# Patient Record
Sex: FEMALE | Race: OTHER | Employment: UNEMPLOYED | URBAN - METROPOLITAN AREA
[De-identification: names, ages, dates, MRNs, and addresses within clinical notes are randomized per-mention and may not be internally consistent; named-entity substitution may affect disease eponyms.]

---

## 2019-06-05 ENCOUNTER — HOSPITAL ENCOUNTER (OUTPATIENT)
Age: 39
Discharge: HOME | End: 2019-06-05
Payer: COMMERCIAL

## 2019-06-05 LAB
ABSOLUTE EOS #: 0.09 K/UL (ref 0–0.44)
ABSOLUTE IMMATURE GRANULOCYTE: 0.03 K/UL (ref 0–0.3)
ABSOLUTE LYMPH #: 1.83 K/UL (ref 1.1–3.7)
ABSOLUTE MONO #: 0.64 K/UL (ref 0.1–1.2)
ALBUMIN SERPL-MCNC: 4.1 G/DL (ref 3.5–5.2)
ALBUMIN/GLOBULIN RATIO: 1.3 (ref 1–2.5)
ALP BLD-CCNC: 50 U/L (ref 35–104)
ALT SERPL-CCNC: 6 U/L (ref 5–33)
ANION GAP SERPL CALCULATED.3IONS-SCNC: 14 MMOL/L (ref 9–17)
AST SERPL-CCNC: 15 U/L
BASOPHILS # BLD: 1 % (ref 0–2)
BASOPHILS ABSOLUTE: 0.04 K/UL (ref 0–0.2)
BILIRUB SERPL-MCNC: 0.29 MG/DL (ref 0.3–1.2)
BILIRUBIN DIRECT: <0.08 MG/DL
BILIRUBIN, INDIRECT: ABNORMAL MG/DL (ref 0–1)
BUN BLDV-MCNC: 5 MG/DL (ref 6–20)
BUN/CREAT BLD: ABNORMAL (ref 9–20)
CALCIUM SERPL-MCNC: 9.2 MG/DL (ref 8.6–10.4)
CHLORIDE BLD-SCNC: 105 MMOL/L (ref 98–107)
CO2: 21 MMOL/L (ref 20–31)
CREAT SERPL-MCNC: 0.54 MG/DL (ref 0.5–0.9)
DIFFERENTIAL TYPE: ABNORMAL
EOSINOPHILS RELATIVE PERCENT: 1 % (ref 1–4)
GFR AFRICAN AMERICAN: >60 ML/MIN
GFR NON-AFRICAN AMERICAN: >60 ML/MIN
GFR SERPL CREATININE-BSD FRML MDRD: ABNORMAL ML/MIN/{1.73_M2}
GFR SERPL CREATININE-BSD FRML MDRD: ABNORMAL ML/MIN/{1.73_M2}
GLOBULIN: ABNORMAL G/DL (ref 1.5–3.8)
GLUCOSE BLD-MCNC: 98 MG/DL (ref 70–99)
HAV IGM SER IA-ACNC: NONREACTIVE
HCG QUANTITATIVE: <1 IU/L
HCT VFR BLD CALC: 39 % (ref 36.3–47.1)
HEMOGLOBIN: 12.1 G/DL (ref 11.9–15.1)
HEPATITIS B CORE IGM ANTIBODY: NONREACTIVE
HEPATITIS B SURFACE ANTIGEN: NONREACTIVE
HEPATITIS C ANTIBODY: NONREACTIVE
HIV AG/AB: NONREACTIVE
IMMATURE GRANULOCYTES: 0 %
LYMPHOCYTES # BLD: 24 % (ref 24–43)
MCH RBC QN AUTO: 31 PG (ref 25.2–33.5)
MCHC RBC AUTO-ENTMCNC: 31 G/DL (ref 28.4–34.8)
MCV RBC AUTO: 100 FL (ref 82.6–102.9)
MONOCYTES # BLD: 8 % (ref 3–12)
NRBC AUTOMATED: 0 PER 100 WBC
PDW BLD-RTO: 12.8 % (ref 11.8–14.4)
PLATELET # BLD: 268 K/UL (ref 138–453)
PLATELET ESTIMATE: ABNORMAL
PMV BLD AUTO: 10.9 FL (ref 8.1–13.5)
POTASSIUM SERPL-SCNC: 4.1 MMOL/L (ref 3.7–5.3)
RBC # BLD: 3.9 M/UL (ref 3.95–5.11)
RBC # BLD: ABNORMAL 10*6/UL
SEG NEUTROPHILS: 66 % (ref 36–65)
SEGMENTED NEUTROPHILS ABSOLUTE COUNT: 4.96 K/UL (ref 1.5–8.1)
SODIUM BLD-SCNC: 140 MMOL/L (ref 135–144)
TOTAL PROTEIN: 7.2 G/DL (ref 6.4–8.3)
WBC # BLD: 7.6 K/UL (ref 3.5–11.3)
WBC # BLD: ABNORMAL 10*3/UL

## 2019-06-05 PROCEDURE — 84702 CHORIONIC GONADOTROPIN TEST: CPT

## 2019-06-05 PROCEDURE — 80076 HEPATIC FUNCTION PANEL: CPT

## 2019-06-05 PROCEDURE — 36415 COLL VENOUS BLD VENIPUNCTURE: CPT

## 2019-06-05 PROCEDURE — 85025 COMPLETE CBC W/AUTO DIFF WBC: CPT

## 2019-06-05 PROCEDURE — 80048 BASIC METABOLIC PNL TOTAL CA: CPT

## 2019-06-05 PROCEDURE — 80074 ACUTE HEPATITIS PANEL: CPT

## 2019-06-05 PROCEDURE — 86480 TB TEST CELL IMMUN MEASURE: CPT

## 2019-06-05 PROCEDURE — 87389 HIV-1 AG W/HIV-1&-2 AB AG IA: CPT

## 2019-06-07 LAB
QUANTI TB GOLD PLUS: NEGATIVE
QUANTI TB1 MINUS NIL: 0 IU/ML (ref 0–0.34)
QUANTI TB2 MINUS NIL: 0 IU/ML (ref 0–0.34)
QUANTIFERON MITOGEN: 9.58 IU/ML
QUANTIFERON NIL: 0.02 IU/ML

## 2020-12-20 RX ORDER — NIFEDIPINE 30 MG/1
1 TABLET, EXTENDED RELEASE ORAL DAILY
COMMUNITY
Start: 2019-03-31 | End: 2020-12-21

## 2020-12-21 ENCOUNTER — OFFICE VISIT (OUTPATIENT)
Dept: FAMILY MEDICINE CLINIC | Age: 40
End: 2020-12-21
Payer: COMMERCIAL

## 2020-12-21 VITALS
TEMPERATURE: 97.7 F | DIASTOLIC BLOOD PRESSURE: 88 MMHG | WEIGHT: 160.8 LBS | BODY MASS INDEX: 25.24 KG/M2 | OXYGEN SATURATION: 96 % | HEIGHT: 67 IN | HEART RATE: 88 BPM | SYSTOLIC BLOOD PRESSURE: 138 MMHG

## 2020-12-21 PROBLEM — Z82.49 FAMILY HISTORY OF EARLY CAD: Status: ACTIVE | Noted: 2020-12-21

## 2020-12-21 PROBLEM — F41.1 GENERALIZED ANXIETY DISORDER: Status: ACTIVE | Noted: 2020-12-21

## 2020-12-21 PROBLEM — Z80.3 FAMILY HISTORY OF BREAST CANCER IN MOTHER: Status: ACTIVE | Noted: 2020-12-21

## 2020-12-21 PROBLEM — M50.30 DEGENERATION OF CERVICAL INTERVERTEBRAL DISC: Status: ACTIVE | Noted: 2020-12-21

## 2020-12-21 PROBLEM — K21.9 GASTRO-ESOPHAGEAL REFLUX DISEASE WITHOUT ESOPHAGITIS: Status: ACTIVE | Noted: 2020-12-21

## 2020-12-21 PROBLEM — Z92.29 HISTORY OF OPIATE THERAPY: Status: ACTIVE | Noted: 2020-12-21

## 2020-12-21 PROBLEM — Z80.0 FAMILY HISTORY OF COLON CANCER: Status: ACTIVE | Noted: 2020-12-21

## 2020-12-21 PROBLEM — I10 ESSENTIAL HYPERTENSION: Status: ACTIVE | Noted: 2020-12-21

## 2020-12-21 PROBLEM — F17.200 SMOKER: Status: ACTIVE | Noted: 2020-12-21

## 2020-12-21 PROBLEM — F11.988 OPIOID USE, UNSPECIFIED WITH OTHER OPIOID-INDUCED DISORDER (HCC): Status: ACTIVE | Noted: 2020-12-21

## 2020-12-21 PROCEDURE — G8482 FLU IMMUNIZE ORDER/ADMIN: HCPCS | Performed by: FAMILY MEDICINE

## 2020-12-21 PROCEDURE — 99204 OFFICE O/P NEW MOD 45 MIN: CPT | Performed by: FAMILY MEDICINE

## 2020-12-21 PROCEDURE — G8419 CALC BMI OUT NRM PARAM NOF/U: HCPCS | Performed by: FAMILY MEDICINE

## 2020-12-21 PROCEDURE — G8427 DOCREV CUR MEDS BY ELIG CLIN: HCPCS | Performed by: FAMILY MEDICINE

## 2020-12-21 PROCEDURE — 1036F TOBACCO NON-USER: CPT | Performed by: FAMILY MEDICINE

## 2020-12-21 RX ORDER — BUPRENORPHINE AND NALOXONE 8; 2 MG/1; MG/1
FILM, SOLUBLE BUCCAL; SUBLINGUAL
COMMUNITY
Start: 2020-12-12

## 2020-12-21 RX ORDER — LISINOPRIL 5 MG/1
5 TABLET ORAL DAILY
Qty: 90 TABLET | Refills: 1 | Status: SHIPPED | OUTPATIENT
Start: 2020-12-21 | End: 2021-01-18 | Stop reason: SDUPTHER

## 2020-12-21 RX ORDER — ESCITALOPRAM OXALATE 10 MG/1
10 TABLET ORAL DAILY
Qty: 30 TABLET | Refills: 3 | Status: SHIPPED | OUTPATIENT
Start: 2020-12-21 | End: 2022-03-24

## 2020-12-21 ASSESSMENT — ANXIETY QUESTIONNAIRES
2. NOT BEING ABLE TO STOP OR CONTROL WORRYING: 2-OVER HALF THE DAYS
6. BECOMING EASILY ANNOYED OR IRRITABLE: 2-OVER HALF THE DAYS
4. TROUBLE RELAXING: 0-NOT AT ALL
GAD7 TOTAL SCORE: 10
1. FEELING NERVOUS, ANXIOUS, OR ON EDGE: 2-OVER HALF THE DAYS
7. FEELING AFRAID AS IF SOMETHING AWFUL MIGHT HAPPEN: 0-NOT AT ALL
5. BEING SO RESTLESS THAT IT IS HARD TO SIT STILL: 2-OVER HALF THE DAYS
3. WORRYING TOO MUCH ABOUT DIFFERENT THINGS: 2-OVER HALF THE DAYS

## 2020-12-21 ASSESSMENT — PATIENT HEALTH QUESTIONNAIRE - PHQ9
SUM OF ALL RESPONSES TO PHQ QUESTIONS 1-9: 0
SUM OF ALL RESPONSES TO PHQ9 QUESTIONS 1 & 2: 0
SUM OF ALL RESPONSES TO PHQ QUESTIONS 1-9: 0
SUM OF ALL RESPONSES TO PHQ QUESTIONS 1-9: 0
1. LITTLE INTEREST OR PLEASURE IN DOING THINGS: 0
2. FEELING DOWN, DEPRESSED OR HOPELESS: 0

## 2020-12-21 NOTE — PROGRESS NOTES
Visit Information    Have you changed or started any medications since your last visit including any over-the-counter medicines, vitamins, or herbal medicines? no   Have you stopped taking any of your medications? Is so, why? -  no  Are you having any side effects from any of your medications? - no    Have you seen any other physician or provider since your last visit?  no   Have you had any other diagnostic tests since your last visit?  no   Have you been seen in the emergency room and/or had an admission in a hospital since we last saw you?  no   Have you had your routine dental cleaning in the past 6 months?  yes -      Do you have an active MyChart account? If no, what is the barrier?   Yes    Patient Care Team:  Hay Edmonds DO as PCP - General (Family Medicine)    Medical History Review  Past Medical, Family, and Social History reviewed and does contribute to the patient presenting condition    Health Maintenance   Topic Date Due    Varicella vaccine (1 of 2 - 2-dose childhood series) 11/06/1981    DTaP/Tdap/Td vaccine (1 - Tdap) 11/06/1999    Cervical cancer screen  09/15/2017    Lipid screen  11/06/2020    Flu vaccine  Completed    HIV screen  Completed    Hepatitis A vaccine  Aged Out    Hepatitis B vaccine  Aged Out    Hib vaccine  Aged Out    Meningococcal (ACWY) vaccine  Aged Out    Pneumococcal 0-64 years Vaccine  Aged Out

## 2020-12-21 NOTE — PROGRESS NOTES
program for 2 years now. Court mandated. Patient states that her dose is currently being titrated. She denies any other drug use    ANXIETY  Alejandra has an ongoing history of Anxiety. Symptoms includes  below. Symptoms have been going on a long time. Clinical course gradually worsening. Previous treatment includes wellbutrin and xanax , which provided no relief. Patient denies current suicidal and homicidal ideation/intent. Family history significant for anxiety and substance abuse. Possible organic causes contributing are: none. CHP TULIO-7 12/21/2020   Feeling nervous, anxious, or on edge 2-Over half the days   Not able to stop or control worrying 2-Over half the days   Worrying too much about different things 2-Over half the days   Trouble relaxing 0-Not at all   Being so restless that it's hard to sit still 2-Over half the days   Becoming easily annoyed or irritable 2-Over half the days   Feeling afraid as if something awful might happen 0-Not at all   TULIO-7 Total Score 10     PHQ-2 Over the past 2 weeks, how often have you been bothered by any of the following problems? Little interest or pleasure in doing things: Not at all  Feeling down, depressed, or hopeless: Not at all  PHQ-2 Score: 0  PHQ-9 Over the past 2 weeks, how often have you been bothered by any of the following problems?   PHQ-9 Total Score: 0  PHQ-9 Total Score: 0 (12/21/2020  9:12 AM)     Counseling given: Not Answered  Comment: 1 pack every two days    Patient Active Problem List   Diagnosis    Fatigue    Depression    Lumbar radiculopathy, chronic    Degeneration of cervical intervertebral disc    Gastro-esophageal reflux disease without esophagitis    Generalized anxiety disorder    Essential hypertension    Opioid use, unspecified with other opioid-induced disorder (HCC)(Suboxone)    History of opiate therapy    Smoker    Family history of colon cancer    Family history of breast cancer in mother    Family history of early CAD Past Medical History:   Diagnosis Date    Anxiety 2/13/2015    Degeneration of cervical intervertebral disc 12/21/2020    Depression 12/5/2012    Essential hypertension 12/21/2020    Gastro-esophageal reflux disease without esophagitis 12/21/2020    Lumbar radiculopathy, acute 2/27/2013    Opioid use, unspecified with other opioid-induced disorder (HCC)(Suboxone) 12/21/2020    History reviewed. No pertinent surgical history. Family History   Problem Relation Age of Onset    Heart Disease Mother     Diabetes Father      Current Outpatient Medications   Medication Sig Dispense Refill    lisinopril (PRINIVIL;ZESTRIL) 5 MG tablet Take 1 tablet by mouth daily 90 tablet 1    escitalopram (LEXAPRO) 10 MG tablet Take 1 tablet by mouth daily 30 tablet 3    buprenorphine-naloxone (SUBOXONE) 8-2 MG FILM SL film        No current facility-administered medications for this visit. Review of Systems   Prior to Visit Medications    Medication Sig Taking? Authorizing Provider   lisinopril (PRINIVIL;ZESTRIL) 5 MG tablet Take 1 tablet by mouth daily Yes TABITHA Hopson CNP   escitalopram (LEXAPRO) 10 MG tablet Take 1 tablet by mouth daily Yes TABITHA Hopson CNP   buprenorphine-naloxone (SUBOXONE) 8-2 MG FILM SL film   Historical Provider, MD      Social History     Tobacco Use    Smoking status: Former Smoker     Years: 10.00     Types: Cigarettes    Smokeless tobacco: Never Used    Tobacco comment: 1 pack every two days   Substance Use Topics    Alcohol use: Yes     Comment: social      Vitals:    12/21/20 0907   BP: 138/88   Pulse: 88   Temp: 97.7 °F (36.5 °C)   SpO2: 96%   Weight: 160 lb 12.8 oz (72.9 kg)   Height: 5' 6.75\" (1.695 m)     Estimated body mass index is 25.37 kg/m² as calculated from the following:    Height as of this encounter: 5' 6.75\" (1.695 m). Weight as of this encounter: 160 lb 12.8 oz (72.9 kg).   Physical Exam  Most recent labs reviewed with patient, and all questions answered. Lab Results   Component Value Date    WBC 7.6 06/05/2019    HGB 12.1 06/05/2019    HCT 39.0 06/05/2019    .0 06/05/2019     06/05/2019     Lab Results   Component Value Date     06/05/2019    K 4.1 06/05/2019     06/05/2019    CO2 21 06/05/2019    BUN 5 06/05/2019    CREATININE 0.54 06/05/2019    GLUCOSE 98 06/05/2019    GLUCOSE 91 05/01/2012    CALCIUM 9.2 06/05/2019      Lab Results   Component Value Date    ALT 6 06/05/2019    AST 15 06/05/2019    ALKPHOS 50 06/05/2019    BILITOT 0.29 (L) 06/05/2019     Lab Results   Component Value Date    TSH 0.76 12/21/2012     ASSESSMENT/PLAN:  1. Essential hypertension  Worsening  Continue current therapy. DISCUSSED AND ADVISED TO:  Cut down on your salt intake. Cut down on caffeinated drinks, sports drinks. Instructed to check BP at home regularly. Report for any chest pains, shortness of breath, headaches, and lightheadedness. Call the office if your blood pressure continue to be higher than 140/90 or 90/50.      - lisinopril (PRINIVIL;ZESTRIL) 5 MG tablet; Take 1 tablet by mouth daily  Dispense: 90 tablet; Refill: 1    2. Lumbar radiculopathy, chronic  Stable  Continue current therapy. DISCUSSED AND ADVISED TO:  Use heat packs 15 to 20 mins every 2-3 hours. Do some back stretches as tolerated. Refer to hand out for instructions. Call for worsening, numbness, weakness. 3. Degeneration of cervical intervertebral disc  Stable  Continue current therapy. DISCUSSED and ADVISED TO:  Stay at a healthy weight. Continue exercises/PT  Stretch to help prevent stiffness and to prevent injury before exercise. Gentle forms of yoga help keep joints and muscles flexible. Walk instead of jog, ride a bike, swim, and water exercise. Lift weights as tolerated. strong muscles help reduce stress on joints. Take pain medicines exactly as directed and only as needed.       4. Generalized anxiety disorder  Worsening  Continue current therapy. Discussed how to recognize anxiety. Advised to relieve tension with exercise or a massage. Advised to get enough rest.  Advised to avoid alcohol, caffeine, nicotine, and illegal drugs. Which can increase anxiety level and cause sleep problems.    - CBC Auto Differential; Future  - Comprehensive Metabolic Panel, Fasting; Future  - Lipid, Fasting; Future  - Urine Drug Screen; Future  - escitalopram (LEXAPRO) 10 MG tablet; Take 1 tablet by mouth daily  Dispense: 30 tablet; Refill: 3    5. Opioid use, unspecified with other opioid-induced disorder (HCC)(Suboxone)  Stable  Continue  monitor  - buprenorphine-naloxone (SUBOXONE) 8-2 MG FILM SL film  - Urine Drug Screen; Future    6. Smoker  Ongoing  Counseling given to quit smoking. Support offered. Hand out given. Educational material given  Patient declined despite discussion. 7. History of opiate therapy  Ongoing  Monitor  - buprenorphine-naloxone (SUBOXONE) 8-2 MG FILM SL film  - Urine Drug Screen; Future    8. Fatigue, unspecified type  Evaluate  - CBC Auto Differential; Future  - Comprehensive Metabolic Panel, Fasting; Future  - TSH without Reflex; Future  - Vitamin D 25 Hydroxy; Future    9. Elevated fasting blood sugar  Ongoing   Evaluate for metabolic disorders   and or vitamin deficiencies. - Comprehensive Metabolic Panel, Fasting; Future  - Hemoglobin A1C; Future  - Urinalysis Reflex to Culture; Future    10. Family history of colon cancer  historical    11. Family history of breast cancer in mother  historical    15. Family history of early CAD  histporical    15. Screening for lipid disorders    - Lipid, Fasting; Future    14. Need for varicella vaccine    - Varicella Zoster Antibody, IgG; Future     Controlled Substance Monitoring:  Acute and Chronic Pain Monitoring:   RX Monitoring 12/21/2020   Periodic Controlled Substance Monitoring Potential drug abuse or diversion identified, see note documentation.      Orders Placed This Encounter   Procedures    CBC Auto Differential     Standing Status:   Future     Standing Expiration Date:   6/20/2022    Comprehensive Metabolic Panel, Fasting     Standing Status:   Future     Standing Expiration Date:   6/20/2022    Hemoglobin A1C     Standing Status:   Future     Standing Expiration Date:   6/20/2022    Lipid, Fasting     Standing Status:   Future     Standing Expiration Date:   6/20/2022    TSH without Reflex     Standing Status:   Future     Standing Expiration Date:   6/20/2022    Urinalysis Reflex to Culture     Standing Status:   Future     Standing Expiration Date:   6/20/2022     Order Specific Question:   SPECIFY(EX-CATH,MIDSTREAM,CYSTO,ETC)?      Answer:   midstream    Vitamin D 25 Hydroxy     Standing Status:   Future     Standing Expiration Date:   6/20/2022    Varicella Zoster Antibody, IgG     Standing Status:   Future     Standing Expiration Date:   6/20/2022    Urine Drug Screen     Standing Status:   Future     Standing Expiration Date:   12/21/2021     Orders Placed This Encounter   Medications    lisinopril (PRINIVIL;ZESTRIL) 5 MG tablet     Sig: Take 1 tablet by mouth daily     Dispense:  90 tablet     Refill:  1    escitalopram (LEXAPRO) 10 MG tablet     Sig: Take 1 tablet by mouth daily     Dispense:  30 tablet     Refill:  3      Medications Discontinued During This Encounter   Medication Reason    acetaminophen-codeine (TYLENOL #3) 300-30 MG per tablet LIST CLEANUP    albuterol (PROVENTIL HFA) 108 (90 BASE) MCG/ACT inhaler LIST CLEANUP    ALPRAZolam (XANAX) 1 MG tablet LIST CLEANUP    buPROPion (WELLBUTRIN SR) 200 MG SR tablet LIST CLEANUP    cyclobenzaprine (FLEXERIL) 10 MG tablet LIST CLEANUP    busPIRone (BUSPAR) 15 MG tablet LIST CLEANUP    ibuprofen (IBU) 800 MG tablet LIST CLEANUP    naproxen (NAPROSYN) 500 MG tablet LIST CLEANUP    NIFEdipine (PROCARDIA XL) 30 MG extended release tablet LIST CLEANUP      Health Maintenance Due   Topic Date Due    Varicella vaccine (1 of 2 - 2-dose childhood series) 11/06/1981    DTaP/Tdap/Td vaccine (1 - Tdap) 11/06/1999    Cervical cancer screen  09/15/2017    Potassium monitoring  06/05/2020    Creatinine monitoring  06/05/2020    Lipid screen  11/06/2020      Return in about 4 weeks (around 1/18/2021) for Rev. results,neuro consult, pain mgmt,  30mins. Alejandra received counseling on the following healthy behaviors: nutrition, exercise, medication adherence and tobacco cessation  Reviewed prior labs and health maintenance  Continue current medications, diet and exercise. Discussed use, benefit, and side effects of prescribed medications. Barriers to medication compliance addressed. Patient given educational materials - see patient instructions  Was a self-tracking handout given in paper form or via [employer]? Yes    Requested Prescriptions     Signed Prescriptions Disp Refills    lisinopril (PRINIVIL;ZESTRIL) 5 MG tablet 90 tablet 1     Sig: Take 1 tablet by mouth daily    escitalopram (LEXAPRO) 10 MG tablet 30 tablet 3     Sig: Take 1 tablet by mouth daily       All patient questions answered. Patient voiced understanding. Quality Measures    Body mass index is 25.37 kg/m². Elevated. Weight control planned discussed Healthy diet and regular exercise. BP: 138/88 Blood pressure is high. Treatment plan consists of Antihypertensive Medication Started. No results found for: LDLCALC, LDLCHOLESTEROL, LDLDIRECT (goal LDL reduction with dx if diabetes is 50% LDL reduction)      PHQ Scores 12/21/2020   PHQ2 Score 0   PHQ9 Score 0     Interpretation of Total Score Depression Severity: 1-4 = Minimal depression, 5-9 = Mild depression, 10-14 = Moderate depression, 15-19 = Moderately severe depression, 20-27 = Severe depression   This note was completed by using the assistance of a speech-recognition program. However, inadvertent computerized transcription errors may be present.  Although every effort was made to ensure accuracy, no guarantees can be provided that every mistake has been identified and corrected by editing   An electronic signature was used to authenticate this note.   --TABITHA Aleman - CNP on 12/21/2020 at 10:09 AM

## 2020-12-21 NOTE — PATIENT INSTRUCTIONS
Patient Education        escitalopram  Pronunciation:  ELLIOTT ALVARADO o latrice  Brand:  Lexapro  What is the most important information I should know about escitalopram?  You should not use this medicine you also take pimozide (Orap) or citalopram (Celexa). Do not use escitalopram within 14 days before or 14 days after you have used an MAO inhibitor, such as isocarboxazid, linezolid, methylene blue injection, phenelzine, rasagiline, selegiline, or tranylcypromine. Some young people have thoughts about suicide when first taking an antidepressant. Stay alert to changes in your mood or symptoms. Report any new or worsening symptoms to your doctor. Seek medical attention right away if you have symptoms of serotonin syndrome, such as: agitation, hallucinations, fever, sweating, shivering, fast heart rate, muscle stiffness, twitching, loss of coordination, nausea, vomiting, or diarrhea. Do not give this medicine to anyone under 12 years. What is escitalopram?  Escitalopram is an antidepressant in a group of drugs called selective serotonin reuptake inhibitors (SSRIs). Escitalopram affects chemicals in the brain that may be unbalanced in people with depression or anxiety. Escitalopram is used to treat anxiety in adults. Escitalopram is also used to treat major depressive disorder in adults and adolescents who are at least 15years old. Escitalopram may also be used for purposes not listed in this medication guide. What should I discuss with my healthcare provider before taking escitalopram?  You should not use this medicine if you are allergic to escitalopram or citalopram (Celexa), or if:  · you also take pimozide or citalopram.  Do not use escitalopram within 14 days before or 14 days after you have used an MAO inhibitor. A dangerous drug interaction could occur. MAO inhibitors include isocarboxazid, linezolid, phenelzine, rasagiline, selegiline, and tranylcypromine. Some medicines can interact with escitalopram and cause a serious condition called serotonin syndrome. Be sure your doctor knows if you also take stimulant medicine, opioid medicine, herbal products, or medicine for depression, mental illness, Parkinson's disease, migraine headaches, serious infections, or prevention of nausea and vomiting. Ask your doctor before making any changes in how or when you take your medications. To make sure escitalopram is safe for you, tell your doctor if you have ever had:  · liver or kidney disease;  · seizures;  · low levels of sodium in your blood;  · heart disease, high blood pressure;  · a stroke;  · a bleeding or blood clotting disorder;  · bipolar disorder (manic depression); or  · drug addiction or suicidal thoughts. Some young people have thoughts about suicide when first taking an antidepressant. Your doctor should check your progress at regular visits. Your family or other caregivers should also be alert to changes in your mood or symptoms. Taking an SSRI antidepressant during pregnancy may cause serious lung problems or other complications in the baby. However, you may have a relapse of depression if you stop taking your antidepressant. Tell your doctor right away if you become pregnant while taking escitalopram. Do not start or stop taking this medicine during pregnancy without your doctor's advice. Escitalopram can pass into breast milk and may harm a nursing baby. Tell your doctor if you are breast-feeding a baby. Escitalopram should not be given to a child younger than 15years old. How should I take escitalopram?  Follow all directions on your prescription label. Your doctor may occasionally change your dose. Do not take this medicine in larger or smaller amounts or for longer than recommended. You may take escitalopram with or without food. Try to take the medicine at the same time each day. Measure liquid medicine with the dosing syringe provided, or with a special dose-measuring spoon or medicine cup. If you do not have a dose-measuring device, ask your pharmacist for one. It may take up to 4 weeks before your symptoms improve. Keep using the medication as directed and tell your doctor if your symptoms do not improve. Do not stop using escitalopram suddenly, or you could have unpleasant withdrawal symptoms. Follow your doctor's instructions about tapering your dose. Store at room temperature away from moisture and heat. What happens if I miss a dose? Take the missed dose as soon as you remember. Skip the missed dose if it is almost time for your next scheduled dose. Do not take extra medicine to make up the missed dose. What happens if I overdose? Seek emergency medical attention or call the Poison Help line at 1-763.484.8199. What should I avoid while taking escitalopram?  Ask your doctor before taking a nonsteroidal anti-inflammatory drug (NSAID) for pain, arthritis, fever, or swelling. This includes aspirin, ibuprofen (Advil, Motrin), naproxen (Aleve), celecoxib (Celebrex), diclofenac, indomethacin, meloxicam, and others. Using an NSAID with escitalopram may cause you to bruise or bleed easily. Drinking alcohol with this medicine can cause side effects. Escitalopram may impair your thinking or reactions. Be careful if you drive or do anything that requires you to be alert. What are the possible side effects of escitalopram?  Get emergency medical help if you have signs of an allergic reaction: skin rash or hives; difficulty breathing; swelling of your face, lips, tongue, or throat. Report any new or worsening symptoms to your doctor, such as: mood or behavior changes, anxiety, panic attacks, trouble sleeping, or if you feel impulsive, irritable, agitated, hostile, aggressive, restless, hyperactive (mentally or physically), more depressed, or have thoughts about suicide or hurting yourself. Call your doctor at once if you have:  · blurred vision, tunnel vision, eye pain or swelling, or seeing halos around lights;  · racing thoughts, unusual risk-taking behavior, feelings of extreme happiness or sadness;  · low levels of sodium in the body --headache, confusion, slurred speech, severe weakness, vomiting, loss of coordination, feeling unsteady; or  · severe nervous system reaction --very stiff (rigid) muscles, high fever, sweating, confusion, fast or uneven heartbeats, tremors, feeling like you might pass out. Seek medical attention right away if you have symptoms of serotonin syndrome, such as: agitation, hallucinations, fever, sweating, shivering, fast heart rate, muscle stiffness, twitching, loss of coordination, nausea, vomiting, or diarrhea. Common side effects may include:  · dizziness, drowsiness, weakness;  · sweating, feeling shaky or anxious;  · sleep problems (insomnia);  · dry mouth, loss of appetite;  · nausea, constipation;  · yawning;  · weight changes; or  · decreased sex drive, impotence, or difficulty having an orgasm. This is not a complete list of side effects and others may occur. Call your doctor for medical advice about side effects. You may report side effects to FDA at 4-550-FDA-5971. What other drugs will affect escitalopram?  Taking escitalopram with other drugs that make you sleepy can worsen this effect. Ask your doctor before taking a sleeping pill, narcotic medication, muscle relaxer, or medicine for anxiety, depression, or seizures. Tell your doctor about all medicines you use, and those you start or stop using during your treatment with escitalopram, especially:  · any other antidepressant;  · medicine to treat anxiety, mood disorders, or mental illness;  · lithium, Amando's wort, tramadol, or tryptophan (sometimes called L-tryptophan);  · a blood thinner --warfarin, Coumadin, Jantoven;  · migraine headache medication --sumatriptan, rizatriptan, and others;  · narcotic pain medication --fentanyl or tramadol; or  · stimulants or ADHD medication --Adderall, Concerta, Ritalin, and others; This list is not complete. Other drugs may interact with escitalopram, including prescription and over-the-counter medicines, vitamins, and herbal products. Not all possible interactions are listed in this medication guide. Where can I get more information? Your pharmacist can provide more information about escitalopram.  Remember, keep this and all other medicines out of the reach of children, never share your medicines with others, and use this medication only for the indication prescribed. Every effort has been made to ensure that the information provided by [address] is accurate, up-to-date, and complete, but no guarantee is made to that effect. Drug information contained herein may be time sensitive. [facility] information has been compiled for use by healthcare practitioners and consumers in the [place] and therefore [facility] does not warrant that uses outside of the [place] are appropriate, unless specifically indicated otherwise. [facility]'s drug information does not endorse drugs, diagnose patients or recommend therapy. [facility]'s drug information is an informational resource designed to assist licensed healthcare practitioners in caring for their patients and/or to serve consumers viewing this service as a supplement to, and not a substitute for, the expertise, skill, knowledge and judgment of healthcare practitioners. The absence of a warning for a given drug or drug combination in no way should be construed to indicate that the drug or drug combination is safe, effective or appropriate for any given patient. [facility] does not assume any responsibility for any aspect of healthcare administered with the aid of information [facility] provides. The information contained herein is not intended to cover all possible uses, directions, precautions, warnings, drug interactions, allergic reactions, or adverse effects. If you have questions about the drugs you are taking, check with your doctor, nurse or pharmacist.  Copyright [address] [address]. Version: 16.01. Revision date: 7/19/2017. Care instructions adapted under license by [facility] ([facility]). If you have questions about a medical condition or this instruction, always ask your healthcare professional. [place] [address] any warranty or liability for your use of this information.

## 2021-01-17 NOTE — PROGRESS NOTES
P PHYSICIANS  [facility]  [address]  [address] [address]  Dept: [phone]     2021   Chief Complaint   Patient presents with    1 Month Follow-Up    Hypertension     DOING GOOD ON MEDICATION ALSO CHECKING BP AT HOME READINGS AROUND 128/88     HPI  Alejandra Epsinoza (:  1980) is a 36 y.o. female is an established patient. Patient is here today to follow up on her anxiety disorder. She was started on Lexapro 4 weeks ago. History below:    [facility] has a well controlled hypertension. she is currently on Lisinopril. Patient's most recent BP in the office was stable. she reports stable BP readings at home. Patient denies any adverse reaction to this therapy. she denies any CP, SOB, HA, or palpitations. Patient admits to exercising and adheres to low salt diet. No history of organ damage due to condition noted. Lab Results   Component Value Date/Time    CREATININE 0.54 2019 11:50 AM     ANXIETY  Alejandra has a worsening history of Anxiety. Symptoms includes  below on screening. It includes feeling nervous, not able to stop worrying, and feeling restless and irritable. Symptoms have been going on a long time. Clinical course gradually worsening. Previous treatment includes wellbutrin and xanax , which provided no relief. Patient denies current suicidal and homicidal ideation/intent. Family history significant for anxiety and substance abuse. Possible organic causes contributing are: none. Patient was started on Lexapro 4 weeks ago. She reports fair success with the therapy. However, she still has some fair amount of anxiety. We will start wellbutrin. Recheck in 4 weeks.  .   CHP TULIO-7 2020   Feeling nervous, anxious, or on edge 1-Several days 2-Over half the days   Not able to stop or control worrying 1-Several days 2-Over half the days   Worrying too much about different things 1-Several days 2-Over half the days Trouble relaxing 1-Several days 0-Not at all   Being so restless that it's hard to sit still 1-Several days 2-Over half the days   Becoming easily annoyed or irritable 1-Several days 2-Over half the days   Feeling afraid as if something awful might happen 1-Several days 0-Not at all   TULIO-7 Total Score 7 10     PHQ-2 Over the past 2 weeks, how often have you been bothered by any of the following problems? Little interest or pleasure in doing things: Not at all  Feeling down, depressed, or hopeless: Not at all  PHQ-2 Score: 0  PHQ-9 Over the past 2 weeks, how often have you been bothered by any of the following problems? PHQ-9 Total Score: 0  PHQ-9 Total Score: 0 (1/18/2021  3:14 PM)     She is due for Mammogram.   Denies breast pain, lumps or nipple discharge. She declines breast exam today. Counseling given: Not Answered  Comment: 1 pack every two days    Patient Active Problem List   Diagnosis    Fatigue    Depression    Lumbar radiculopathy, chronic    Degeneration of cervical intervertebral disc    Gastro-esophageal reflux disease without esophagitis    Generalized anxiety disorder    Essential hypertension    Opioid use, unspecified with other opioid-induced disorder (HCC)(Suboxone)    History of opiate therapy    Smoker    Family history of colon cancer    Family history of breast cancer in mother    Family history of early CAD      Past Medical History:   Diagnosis Date    Anxiety 2/13/2015    Degeneration of cervical intervertebral disc 12/21/2020    Depression 12/5/2012    Essential hypertension 12/21/2020    Gastro-esophageal reflux disease without esophagitis 12/21/2020    Lumbar radiculopathy, acute 2/27/2013    Opioid use, unspecified with other opioid-induced disorder (HCC)(Suboxone) 12/21/2020    History reviewed. No pertinent surgical history.   Family History   Problem Relation Age of Onset    Heart Disease Mother     Diabetes Father      Current Outpatient Medications Medication Sig Dispense Refill    lisinopril (PRINIVIL;ZESTRIL) 5 MG tablet Take 1 tablet by mouth daily 90 tablet 1    buPROPion (WELLBUTRIN XL) 150 MG extended release tablet Take 1 tablet by mouth 2 times daily 60 tablet 0    buprenorphine-naloxone (SUBOXONE) 8-2 MG FILM SL film       escitalopram (LEXAPRO) 10 MG tablet Take 1 tablet by mouth daily 30 tablet 3     No current facility-administered medications for this visit. Review of Systems   Constitutional: Negative for chills, fatigue and fever. HENT: Negative. Respiratory: Negative. Negative for cough and shortness of breath. Cardiovascular: Negative. Negative for chest pain and palpitations. Gastrointestinal: Negative for abdominal pain. Genitourinary: Negative for dysuria. Musculoskeletal: Negative for arthralgias and myalgias. Skin: Negative for rash. Neurological: Negative for light-headedness and headaches. Psychiatric/Behavioral: Positive for sleep disturbance. Negative for dysphoric mood and suicidal ideas. The patient is nervous/anxious. Prior to Visit Medications    Medication Sig Taking?  Authorizing Provider   lisinopril (PRINIVIL;ZESTRIL) 5 MG tablet Take 1 tablet by mouth daily Yes TABITHA Hopson CNP   buPROPion (WELLBUTRIN XL) 150 MG extended release tablet Take 1 tablet by mouth 2 times daily Yes TABITHA Hopson CNP   buprenorphine-naloxone (SUBOXONE) 8-2 MG FILM SL film  Yes Historical Provider, MD   escitalopram (LEXAPRO) 10 MG tablet Take 1 tablet by mouth daily Yes TABITHA Hopson CNP      Social History     Tobacco Use    Smoking status: Former Smoker     Years: 10.00     Types: Cigarettes    Smokeless tobacco: Never Used    Tobacco comment: 1 pack every two days   Substance Use Topics    Alcohol use: Yes     Comment: social      Vitals:    01/18/21 1003   BP: 126/86   Site: Left Upper Arm   Pulse: 76   Temp: 97.6 °F (36.4 °C)   SpO2: 100%   Weight: 163 lb 12.8 oz (74.3 kg)   Height: 5' (1.524 m)     Estimated body mass index is 31.99 kg/m² as calculated from the following:    Height as of this encounter: 5' (1.524 m). Weight as of this encounter: 163 lb 12.8 oz (74.3 kg). Physical Exam  Vitals signs and nursing note reviewed. Constitutional:       Appearance: She is well-developed. She is obese. HENT:      Head: Normocephalic. Right Ear: Tympanic membrane and external ear normal.      Left Ear: Tympanic membrane and external ear normal.      Nose: Nose normal.      Mouth/Throat:      Mouth: Mucous membranes are moist.   Eyes:      Pupils: Pupils are equal, round, and reactive to light. Neck:      Musculoskeletal: Normal range of motion and neck supple. Thyroid: No thyromegaly. Vascular: No JVD. Cardiovascular:      Rate and Rhythm: Normal rate and regular rhythm. Pulses: Normal pulses. Heart sounds: Normal heart sounds. No murmur. Pulmonary:      Effort: Pulmonary effort is normal. No respiratory distress. Breath sounds: Normal breath sounds. Abdominal:      General: Abdomen is protuberant. Bowel sounds are normal. There is no distension. Palpations: Abdomen is soft. Tenderness: There is no abdominal tenderness. Comments: Obese     Musculoskeletal: Normal range of motion. Lymphadenopathy:      Cervical: No cervical adenopathy. Skin:     General: Skin is warm and dry. Capillary Refill: Capillary refill takes less than 2 seconds. Neurological:      Mental Status: She is alert and oriented to person, place, and time. Psychiatric:         Attention and Perception: Attention normal.         Mood and Affect: Mood is anxious. Speech: Speech normal. Speech is not rapid and pressured. Behavior: Behavior normal.         Thought Content: Thought content does not include homicidal or suicidal ideation. Most recent labs reviewed with patient, and all questions answered.   Lab Results Component Value Date    WBC 7.6 06/05/2019    HGB 12.1 06/05/2019    HCT 39.0 06/05/2019    .0 06/05/2019     06/05/2019     Lab Results   Component Value Date     06/05/2019    K 4.1 06/05/2019     06/05/2019    CO2 21 06/05/2019    BUN 5 06/05/2019    CREATININE 0.54 06/05/2019    GLUCOSE 98 06/05/2019    GLUCOSE 91 05/01/2012    CALCIUM 9.2 06/05/2019      Lab Results   Component Value Date    ALT 6 06/05/2019    AST 15 06/05/2019    ALKPHOS 50 06/05/2019    BILITOT 0.29 (L) 06/05/2019     Lab Results   Component Value Date    TSH 0.76 12/21/2012     ASSESSMENT/PLAN:  1. Generalized anxiety disorder  Improving  Continue current therapy. Discussed how to recognize anxiety. Advised to relieve tension with exercise or a massage. Advised to get enough rest.  Advised to avoid alcohol, caffeine, nicotine, and illegal drugs. Which can increase anxiety level and cause sleep problems. - buPROPion (WELLBUTRIN XL) 150 MG extended release tablet; Take 1 tablet by mouth 2 times daily  Dispense: 60 tablet; Refill: 0    2. Essential hypertension  Stable  Continue current therapy. DISCUSSED AND ADVISED TO:  Cut down on your salt intake. Cut down on caffeinated drinks, sports drinks. Instructed to check BP at home regularly. Report for any chest pains, shortness of breath, headaches, and lightheadedness. Call the office if your blood pressure continue to be higher than 140/90 or 90/50.    - lisinopril (PRINIVIL;ZESTRIL) 5 MG tablet; Take 1 tablet by mouth daily  Dispense: 90 tablet; Refill: 1    3. Breast cancer screening by mammogram  recommended    - TIFFANY DIGITAL SCREEN W OR WO CAD BILATERAL; Future       Controlled Substance Monitoring:  Acute and Chronic Pain Monitoring:   RX Monitoring 1/16/2021   Attestation The Prescription Monitoring Report for this patient was reviewed today.    Periodic Controlled Substance Monitoring Potential drug abuse or diversion identified, see note documentation. Orders Placed This Encounter   Procedures    TIFFANY DIGITAL SCREEN W OR WO CAD BILATERAL     Standing Status:   Future     Standing Expiration Date:   7/17/2022     Order Specific Question:   Reason for exam:     Answer:   screening     Orders Placed This Encounter   Medications    lisinopril (PRINIVIL;ZESTRIL) 5 MG tablet     Sig: Take 1 tablet by mouth daily     Dispense:  90 tablet     Refill:  1    buPROPion (WELLBUTRIN XL) 150 MG extended release tablet     Sig: Take 1 tablet by mouth 2 times daily     Dispense:  60 tablet     Refill:  0      Medications Discontinued During This Encounter   Medication Reason    lisinopril (PRINIVIL;ZESTRIL) 5 MG tablet REORDER      Health Maintenance Due   Topic Date Due    Varicella vaccine (1 of 2 - 2-dose childhood series) 11/06/1981    Cervical cancer screen  09/15/2017    Potassium monitoring  06/05/2020    Creatinine monitoring  06/05/2020    Lipid screen  11/06/2020    Breast cancer screen  11/06/2020      Return in about 4 weeks (around 2/15/2021) for Rev. results. Alejandra received counseling on the following healthy behaviors: nutrition, exercise, medication adherence and tobacco cessation  Reviewed prior labs and health maintenance  Continue current medications, diet and exercise. Discussed use, benefit, and side effects of prescribed medications. Barriers to medication compliance addressed. Patient given educational materials - see patient instructions  Was a self-tracking handout given in paper form or via [employer]? Yes    Requested Prescriptions     Signed Prescriptions Disp Refills    lisinopril (PRINIVIL;ZESTRIL) 5 MG tablet 90 tablet 1     Sig: Take 1 tablet by mouth daily    buPROPion (WELLBUTRIN XL) 150 MG extended release tablet 60 tablet 0     Sig: Take 1 tablet by mouth 2 times daily       All patient questions answered. Patient voiced understanding. Quality Measures    Body mass index is 31.99 kg/m². Elevated.  Weight

## 2021-01-18 ENCOUNTER — OFFICE VISIT (OUTPATIENT)
Dept: FAMILY MEDICINE CLINIC | Age: 41
End: 2021-01-18
Payer: COMMERCIAL

## 2021-01-18 VITALS
SYSTOLIC BLOOD PRESSURE: 126 MMHG | BODY MASS INDEX: 32.16 KG/M2 | DIASTOLIC BLOOD PRESSURE: 86 MMHG | HEIGHT: 60 IN | HEART RATE: 76 BPM | WEIGHT: 163.8 LBS | OXYGEN SATURATION: 100 % | TEMPERATURE: 97.6 F

## 2021-01-18 DIAGNOSIS — I10 ESSENTIAL HYPERTENSION: ICD-10-CM

## 2021-01-18 DIAGNOSIS — Z12.31 BREAST CANCER SCREENING BY MAMMOGRAM: ICD-10-CM

## 2021-01-18 DIAGNOSIS — F41.1 GENERALIZED ANXIETY DISORDER: Primary | ICD-10-CM

## 2021-01-18 PROCEDURE — G8417 CALC BMI ABV UP PARAM F/U: HCPCS | Performed by: FAMILY MEDICINE

## 2021-01-18 PROCEDURE — G8427 DOCREV CUR MEDS BY ELIG CLIN: HCPCS | Performed by: FAMILY MEDICINE

## 2021-01-18 PROCEDURE — 99213 OFFICE O/P EST LOW 20 MIN: CPT | Performed by: FAMILY MEDICINE

## 2021-01-18 PROCEDURE — G8482 FLU IMMUNIZE ORDER/ADMIN: HCPCS | Performed by: FAMILY MEDICINE

## 2021-01-18 PROCEDURE — 1036F TOBACCO NON-USER: CPT | Performed by: FAMILY MEDICINE

## 2021-01-18 RX ORDER — BUPROPION HYDROCHLORIDE 150 MG/1
150 TABLET ORAL 2 TIMES DAILY
Qty: 60 TABLET | Refills: 0 | Status: SHIPPED | OUTPATIENT
Start: 2021-01-18 | End: 2022-03-24

## 2021-01-18 RX ORDER — LISINOPRIL 5 MG/1
5 TABLET ORAL DAILY
Qty: 90 TABLET | Refills: 1 | Status: SHIPPED | OUTPATIENT
Start: 2021-01-18 | End: 2021-09-14

## 2021-01-18 ASSESSMENT — ANXIETY QUESTIONNAIRES
5. BEING SO RESTLESS THAT IT IS HARD TO SIT STILL: 1-SEVERAL DAYS
1. FEELING NERVOUS, ANXIOUS, OR ON EDGE: 1-SEVERAL DAYS
7. FEELING AFRAID AS IF SOMETHING AWFUL MIGHT HAPPEN: 1-SEVERAL DAYS

## 2021-01-18 ASSESSMENT — ENCOUNTER SYMPTOMS
ABDOMINAL PAIN: 0
COUGH: 0
RESPIRATORY NEGATIVE: 1
SHORTNESS OF BREATH: 0

## 2021-01-18 NOTE — PATIENT INSTRUCTIONS
Patient Education        bupropion  Pronunciation:  byoo PRO pee on  Brand:  Aplenzin, Forfivo XL, Wellbutrin SR, Wellbutrin XL, Zyban Advantage Pack  What is the most important information I should know about bupropion? You should not take bupropion if you have seizures or an eating disorder, or if you have suddenly stopped using alcohol, seizure medication, or sedatives. If you take Wellbutrin for depression, do not also take Zyban to quit smoking. Do not use bupropion within 14 days before or 14 days after you have used an MAO inhibitor, such as isocarboxazid, linezolid, methylene blue injection, phenelzine, rasagiline, selegiline, or tranylcypromine. Some young people have thoughts about suicide when first taking an antidepressant. Stay alert to changes in your mood or symptoms. Report any new or worsening symptoms to your doctor. What is bupropion? Bupropion is an antidepressant used to treat major depressive disorder and seasonal affective disorder. The Zyban brand of bupropion is used to help people stop smoking by reducing cravings and other withdrawal effects. Bupropion may also be used for purposes not listed in this medication guide. What should I discuss with my healthcare provider before taking bupropion? You should not take bupropion if you are allergic to it, or if you have:  · a seizure disorder;  · an eating disorder such as anorexia or bulimia; or  · if you have suddenly stopped using alcohol, seizure medication, or a sedative (such as Xanax, Valium, Fiorinal, Klonopin, and others). Do not use an MAO inhibitor within 14 days before or 14 days after you take bupropion. A dangerous drug interaction could occur. MAO inhibitors include isocarboxazid, linezolid, phenelzine, rasagiline, selegiline, and tranylcypromine. Do not take bupropion to treat more than one condition at a time. If you take bupropion for depression, do not also take this medicine to quit smoking. Bupropion may cause seizures, especially if you have certain medical conditions or use certain drugs. Tell your doctor about all of your medical conditions and the drugs you use. Tell your doctor if you have ever had:  · a head injury, seizures, or brain or spinal cord tumor;  · narrow-angle glaucoma;  · heart disease, high blood pressure, or a heart attack;  · diabetes;  · kidney or liver disease (especially cirrhosis);  · depression, bipolar disorder, or other mental illness; or  · if you drink alcohol. Some young people have thoughts about suicide when first taking an antidepressant. Your doctor will need to check your progress at regular visits. Your family or other caregivers should also be alert to changes in your mood or symptoms. Ask your doctor about taking this medicine if you are pregnant. It is not known whether bupropion will harm an unborn baby. However, you may have a relapse of depression if you stop taking your antidepressant. Tell your doctor right away if you become pregnant. Do not start or stop taking bupropion without your doctor's advice. If you are pregnant, your name may be listed on a pregnancy registry to track the effects of bupropion on the baby. It may not be safe to breastfeed while using this medicine. Ask your doctor about any risk. Bupropion is not approved for use by anyone younger than 25years old. How should I take bupropion? Follow all directions on your prescription label and read all medication guides or instruction sheets. Your doctor may occasionally change your dose. Use the medicine exactly as directed. Too much of this medicine can increase your risk of a seizure. You may take bupropion with or without food. Swallow the extended-release tablet whole and do not crush, chew, or break it. You should not change your dose or stop using bupropion suddenly, unless you have a seizure while taking this medicine. Stopping suddenly can cause unpleasant withdrawal symptoms. Ask your doctor how to safely stop using bupropion. If you take Zyban to help you stop smoking, you may continue to smoke for about 1 week after you start the medicine. Set a date to quit smoking during the first 2 weeks of treatment. Talk to your doctor if you have trouble quitting after taking Zyban for 7 to 12 weeks. Your doctor may prescribe a nicotine replacement product (such as patches or gum) to help you stop smoking. Start using the nicotine replacement product on the same day you stop (quit) smoking or using tobacco products. Some people taking bupropion (Wellbutrin or Zyban) have had high blood pressure that is severe, especially when also using a nicotine replacement product (patch or gum). Your blood pressure may need to be checked before and during treatment with bupropion. Read and carefully follow any Instructions for Use provided with your medicine. Ask your doctor or pharmacist if you do not understand these instructions. You may have nicotine withdrawal symptoms when you stop smoking, including: increased appetite, weight gain, trouble sleeping, trouble concentrating, slower heart rate, having the urge to smoke, and feeling anxious, restless, depressed, angry, frustrated, or irritated. These symptoms may occur with or without using medication such as Zyban. Smoking cessation may also cause new or worsening mental health problems, such as depression. This medicine may affect a drug-screening urine test and you may have false results. Tell the laboratory staff that you use bupropion. Store at room temperature away from moisture, heat, and light. What happens if I miss a dose? Skip the missed dose and use your next dose at the regular time. Do not use two doses at one time. What happens if I overdose? Seek emergency medical attention or call the Poison Help line at 1-799.617.4974. An overdose of bupropion can be fatal.  Overdose symptoms may include muscle stiffness, hallucinations, fast or uneven heartbeat, shallow breathing, or fainting. What should I avoid while taking bupropion? Drinking alcohol with bupropion may increase your risk of seizures. If you drink alcohol regularly, talk with your doctor before changing the amount you drink. Bupropion can also cause seizures in a regular drinker who suddenly stops drinking at the start of treatment with bupropion. Avoid driving or hazardous activity until you know how this medicine will affect you. Your reactions could be impaired. What are the possible side effects of bupropion? Get emergency medical help if you have signs of an allergic reaction (hives, itching, fever, swollen glands, difficult breathing, swelling in your face or throat) or a severe skin reaction (fever, sore throat, burning eyes, skin pain, red or purple skin rash with blistering and peeling). Report any new or worsening symptoms to your doctor, such as: mood or behavior changes, anxiety, depression, panic attacks, trouble sleeping, or if you feel impulsive, irritable, agitated, hostile, aggressive, restless, hyperactive (mentally or physically), more depressed, or have thoughts about suicide or hurting yourself. Call your doctor at once if you have:  · a seizure (convulsions);  · confusion, unusual changes in mood or behavior;  · blurred vision, tunnel vision, eye pain or swelling, or seeing halos around lights;  · fast or irregular heartbeats; or  · a manic episode --racing thoughts, increased energy, reckless behavior, feeling extremely happy or irritable, talking more than usual, severe problems with sleep.   Common side effects may include:  · dry mouth, sore throat, stuffy nose;  · ringing in the ears;  · blurred vision; · nausea, vomiting, stomach pain, loss of appetite, constipation;  · sleep problems (insomnia);  · tremors, sweating, feeling anxious or nervous;  · fast heartbeats;  · confusion, agitation, hostility;  · rash;  · weight loss;  · increased urination;  · headache, dizziness; or  · muscle or joint pain. This is not a complete list of side effects and others may occur. Call your doctor for medical advice about side effects. You may report side effects to FDA at 7-222-UEX-1245. What other drugs will affect bupropion? You may have a higher risk of seizures if you use certain other medicines while taking bupropion. Many drugs can affect bupropion. This includes prescription and over-the-counter medicines, vitamins, and herbal products. Not all possible interactions are listed here. Tell your doctor about all your current medicines and any medicine you start or stop using. Where can I get more information? Your pharmacist can provide more information about bupropion. Remember, keep this and all other medicines out of the reach of children, never share your medicines with others, and use this medication only for the indication prescribed. Every effort has been made to ensure that the information provided by [address] is accurate, up-to-date, and complete, but no guarantee is made to that effect. Drug information contained herein may be time sensitive. [facility] information has been compiled for use by healthcare practitioners and consumers in the [place] and therefore [facility] does not warrant that uses outside of the [place] are appropriate, unless specifically indicated otherwise. [facility]'s drug information does not endorse drugs, diagnose patients or recommend therapy. [facility]'s drug information is an informational resource designed to assist licensed healthcare practitioners in caring for their patients and/or to serve consumers viewing this service as a supplement to, and not a substitute for, the expertise, skill, knowledge and judgment of healthcare practitioners. The absence of a warning for a given drug or drug combination in no way should be construed to indicate that the drug or drug combination is safe, effective or appropriate for any given patient. [facility] does not assume any responsibility for any aspect of healthcare administered with the aid of information [facility] provides. The information contained herein is not intended to cover all possible uses, directions, precautions, warnings, drug interactions, allergic reactions, or adverse effects. If you have questions about the drugs you are taking, check with your doctor, nurse or pharmacist.  Copyright 3882-8709 [address]: 24.01. Revision date: 1/27/2020. Care instructions adapted under license by [facility] ([facility]). If you have questions about a medical condition or this instruction, always ask your healthcare professional. [place] [address] any warranty or liability for your use of this information.

## 2021-10-08 ENCOUNTER — HOSPITAL ENCOUNTER (OUTPATIENT)
Age: 41
Discharge: HOME | End: 2021-10-08
Payer: COMMERCIAL

## 2021-10-08 LAB
ABSOLUTE EOS #: 0.06 K/UL (ref 0–0.44)
ABSOLUTE IMMATURE GRANULOCYTE: <0.03 K/UL (ref 0–0.3)
ABSOLUTE LYMPH #: 1.54 K/UL (ref 1.1–3.7)
ABSOLUTE MONO #: 0.66 K/UL (ref 0.1–1.2)
ALBUMIN SERPL-MCNC: 4 G/DL (ref 3.5–5.2)
ALBUMIN/GLOBULIN RATIO: 1.6 (ref 1–2.5)
ALP BLD-CCNC: 58 U/L (ref 35–104)
ALT SERPL-CCNC: 8 U/L (ref 5–33)
ANION GAP SERPL CALCULATED.3IONS-SCNC: 9 MMOL/L (ref 9–17)
AST SERPL-CCNC: 16 U/L
BASOPHILS # BLD: 0 % (ref 0–2)
BASOPHILS ABSOLUTE: 0.03 K/UL (ref 0–0.2)
BILIRUB SERPL-MCNC: 0.41 MG/DL (ref 0.3–1.2)
BUN BLDV-MCNC: 7 MG/DL (ref 6–20)
BUN/CREAT BLD: ABNORMAL (ref 9–20)
CALCIUM SERPL-MCNC: 9.2 MG/DL (ref 8.6–10.4)
CHLORIDE BLD-SCNC: 104 MMOL/L (ref 98–107)
CO2: 26 MMOL/L (ref 20–31)
CREAT SERPL-MCNC: 0.53 MG/DL (ref 0.5–0.9)
DIFFERENTIAL TYPE: ABNORMAL
EOSINOPHILS RELATIVE PERCENT: 1 % (ref 1–4)
GFR AFRICAN AMERICAN: >60 ML/MIN
GFR NON-AFRICAN AMERICAN: >60 ML/MIN
GFR SERPL CREATININE-BSD FRML MDRD: ABNORMAL ML/MIN/{1.73_M2}
GFR SERPL CREATININE-BSD FRML MDRD: ABNORMAL ML/MIN/{1.73_M2}
GLUCOSE BLD-MCNC: 114 MG/DL (ref 70–99)
HAV IGM SER IA-ACNC: NONREACTIVE
HCG QUALITATIVE: NEGATIVE
HCT VFR BLD CALC: 38.6 % (ref 36.3–47.1)
HEMOGLOBIN: 12.3 G/DL (ref 11.9–15.1)
HEPATITIS B CORE IGM ANTIBODY: NONREACTIVE
HEPATITIS B SURFACE ANTIGEN: NONREACTIVE
HEPATITIS C ANTIBODY: NONREACTIVE
HIV AG/AB: NONREACTIVE
IMMATURE GRANULOCYTES: 0 %
LYMPHOCYTES # BLD: 17 % (ref 24–43)
MCH RBC QN AUTO: 30.1 PG (ref 25.2–33.5)
MCHC RBC AUTO-ENTMCNC: 31.9 G/DL (ref 28.4–34.8)
MCV RBC AUTO: 94.4 FL (ref 82.6–102.9)
MONOCYTES # BLD: 7 % (ref 3–12)
NRBC AUTOMATED: 0 PER 100 WBC
PDW BLD-RTO: 14 % (ref 11.8–14.4)
PLATELET # BLD: 262 K/UL (ref 138–453)
PLATELET ESTIMATE: ABNORMAL
PMV BLD AUTO: 10.9 FL (ref 8.1–13.5)
POTASSIUM SERPL-SCNC: 3.7 MMOL/L (ref 3.7–5.3)
RBC # BLD: 4.09 M/UL (ref 3.95–5.11)
RBC # BLD: ABNORMAL 10*6/UL
SEG NEUTROPHILS: 75 % (ref 36–65)
SEGMENTED NEUTROPHILS ABSOLUTE COUNT: 6.74 K/UL (ref 1.5–8.1)
SODIUM BLD-SCNC: 139 MMOL/L (ref 135–144)
TOTAL PROTEIN: 6.5 G/DL (ref 6.4–8.3)
WBC # BLD: 9.1 K/UL (ref 3.5–11.3)
WBC # BLD: ABNORMAL 10*3/UL

## 2021-10-08 PROCEDURE — 86480 TB TEST CELL IMMUN MEASURE: CPT

## 2021-10-08 PROCEDURE — 80074 ACUTE HEPATITIS PANEL: CPT

## 2021-10-08 PROCEDURE — 84703 CHORIONIC GONADOTROPIN ASSAY: CPT

## 2021-10-08 PROCEDURE — 36415 COLL VENOUS BLD VENIPUNCTURE: CPT

## 2021-10-08 PROCEDURE — 80053 COMPREHEN METABOLIC PANEL: CPT

## 2021-10-08 PROCEDURE — 85025 COMPLETE CBC W/AUTO DIFF WBC: CPT

## 2021-10-08 PROCEDURE — 93005 ELECTROCARDIOGRAM TRACING: CPT | Performed by: NURSE PRACTITIONER

## 2021-10-08 PROCEDURE — 87389 HIV-1 AG W/HIV-1&-2 AB AG IA: CPT

## 2021-10-10 LAB
EKG ATRIAL RATE: 75 BPM
EKG P AXIS: 53 DEGREES
EKG P-R INTERVAL: 144 MS
EKG Q-T INTERVAL: 408 MS
EKG QRS DURATION: 88 MS
EKG QTC CALCULATION (BAZETT): 455 MS
EKG R AXIS: 55 DEGREES
EKG T AXIS: 68 DEGREES
EKG VENTRICULAR RATE: 75 BPM

## 2021-10-10 PROCEDURE — 93010 ELECTROCARDIOGRAM REPORT: CPT | Performed by: INTERNAL MEDICINE

## 2021-10-11 LAB
QUANTI TB GOLD PLUS: NEGATIVE
QUANTI TB1 MINUS NIL: 0.01 IU/ML (ref 0–0.34)
QUANTI TB2 MINUS NIL: 0.01 IU/ML (ref 0–0.34)
QUANTIFERON MITOGEN: >10 IU/ML
QUANTIFERON NIL: 0.02 IU/ML

## 2021-11-20 PROBLEM — O14.93 PRE-ECLAMPSIA IN THIRD TRIMESTER: Status: ACTIVE | Noted: 2021-06-23

## 2021-11-20 PROBLEM — O09.299 HX OF PREECLAMPSIA, PRIOR PREGNANCY, CURRENTLY PREGNANT: Status: ACTIVE | Noted: 2021-02-10

## 2021-11-20 PROBLEM — O30.049 DICHORIONIC DIAMNIOTIC TWIN PREGNANCY, ANTEPARTUM: Status: ACTIVE | Noted: 2021-02-10

## 2021-11-20 PROBLEM — O99.119 MATERNAL ANTITHROMBIN III DEFICIENCY COMPLICATING PREGNANCY (HCC): Status: ACTIVE | Noted: 2021-03-08

## 2021-11-20 PROBLEM — O09.529 ADVANCED MATERNAL AGE IN MULTIGRAVIDA: Status: ACTIVE | Noted: 2021-02-10

## 2021-11-20 PROBLEM — D68.59 MATERNAL ANTITHROMBIN III DEFICIENCY COMPLICATING PREGNANCY (HCC): Status: ACTIVE | Noted: 2021-03-08

## 2021-11-20 PROBLEM — F11.11 H/O OPIOID ABUSE (HCC): Status: ACTIVE | Noted: 2021-02-19

## 2021-11-21 NOTE — PROGRESS NOTES
[facility] PHYSICIANS  [facility]IC[facility]  [facility] 2.  [address] [address]  Dept: [phone]     2021   No chief complaint on file. SUSANA Garza (:  1980) is a 39 y.o. female is new patient. Patient has a history of hypertension, lumbar radiculopathy, cervical disc degeneration, general anxiety disorder, history of Percocet use, Suboxone use, GERD, smoker, fatigue, elevated fasting blood sugar, and pre eclampsia  NO LABS      HYPERTENSION  Alejandra A Champlain Sukhjinder had preeclampsia with her pregnancy. Patient was on nifedipine and labetalol. Patient noticed that her blood pressure had been elevated even after she had her baby. Her baby is now 21months-month-old. Her systolic blood pressure is 130s to 140s most of the time diastolic had always been 68F. Patient denies any chest pain shortness of breath or palpitations. I will start her on some lisinopril. LUMBAR/CERVICAL RADICULOPATHY  Patient reported a previous work-related injury. Patient used to work for [employer]. Patient states that she had a previous evaluation done with a pain management office at [facility]. She was told that she had degenerative disc disease on both the cervical and the lumbar disc. She had injections and MRI done in the past.  She will provide us with records on this. Patient states that she has intermittent pain in both neck and lower back pain she describes as achy and sometimes throbbing type of pain sometimes associated with work and sometimes not. Patient states that pain sometimes radiates to her right leg. She has not been to the specialist for a long time since she lost her job and she had a child. Patient is pursuing to get a disability claim. 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 She is in a Suboxone program for 2 years now. Court mandated. Patient states that her dose is currently being titrated.   She denies any other drug use    ANXIETY  Alejandra has an ongoing history of Anxiety. Symptoms includes  below. Symptoms have been going on a long time. Clinical course gradually worsening. Previous treatment includes wellbutrin and xanax , which provided no relief. Patient denies current suicidal and homicidal ideation/intent. Family history significant for anxiety and substance abuse. Possible organic causes contributing are: none. ANXIETY  Alejandra has a worsening history of Anxiety. Symptoms includes  below on screening. It includes feeling nervous, not able to stop worrying, and feeling restless and irritable. Symptoms have been going on a long time. Clinical course gradually worsening. Previous treatment includes wellbutrin and xanax , which provided no relief. Patient denies current suicidal and homicidal ideation/intent. Family history significant for anxiety and substance abuse. Possible organic causes contributing are: none. Patient was started on Lexapro 4 weeks ago. She reports fair success with the therapy.    However, she still has some fair amount of anxiety    TULIO-7 SCREENING 1/18/2021 12/21/2020   Feeling nervous, anxious, or on edge 1-Several days 2-Over half the days   Not able to stop or control worrying 1-Several days 2-Over half the days   Worrying too much about different things 1-Several days 2-Over half the days   Trouble relaxing 1-Several days 0-Not at all   Being so restless that it's hard to sit still 1-Several days 2-Over half the days   Becoming easily annoyed or irritable 1-Several days 2-Over half the days   Feeling afraid as if something awful might happen 1-Several days 0-Not at all   TULIO-7 Total Score 7 10           No data recorded     Counseling given: Not Answered  Comment: 1 pack every two days    Patient Active Problem List   Diagnosis    Fatigue    Depression    Lumbar radiculopathy, chronic    Degeneration of cervical intervertebral disc    Gastro-esophageal reflux disease without esophagitis    Generalized anxiety disorder    Essential hypertension    Opioid use, unspecified with other opioid-induced disorder (HCC)(Suboxone)    History of opiate therapy    Smoker    Family history of colon cancer    Family history of breast cancer in mother    Family history of early CAD   Martine Salinas Advanced maternal age in multigravida   Martine Salinas Dichorionic diamniotic twin pregnancy, antepartum    H/O opioid abuse ([facility] [address]    Hx of preeclampsia, prior pregnancy, currently pregnant    Maternal antithrombin III deficiency complicating pregnancy ([facility] [address]    Pre-eclampsia in third trimester      Past Medical History:   Diagnosis Date    Anxiety 2/13/2015    Degeneration of cervical intervertebral disc 12/21/2020    Depression 12/5/2012    Essential hypertension 12/21/2020    Gastro-esophageal reflux disease without esophagitis 12/21/2020    Lumbar radiculopathy, acute 2/27/2013    Opioid use, unspecified with other opioid-induced disorder (HCC)(Suboxone) 12/21/2020    No past surgical history on file. Family History   Problem Relation Age of Onset    Heart Disease Mother     Diabetes Father      Current Outpatient Medications   Medication Sig Dispense Refill    enoxaparin (LOVENOX) 40 MG/0.4ML injection Inject 0.4 mLs into the skin daily      lisinopril (PRINIVIL;ZESTRIL) 5 MG tablet TAKE 1 TABLET BY MOUTH DAILY 90 tablet 0    buPROPion (WELLBUTRIN XL) 150 MG extended release tablet Take 1 tablet by mouth 2 times daily 60 tablet 0    buprenorphine-naloxone (SUBOXONE) 8-2 MG FILM SL film       escitalopram (LEXAPRO) 10 MG tablet Take 1 tablet by mouth daily 30 tablet 3     No current facility-administered medications for this visit. Review of Systems   Prior to Visit Medications    Medication Sig Taking?  Authorizing Provider   enoxaparin (LOVENOX) 40 MG/0.4ML injection Inject 0.4 mLs into the skin daily  Historical Provider, MD   lisinopril (PRINIVIL;ZESTRIL) 5 MG tablet TAKE 1 TABLET BY MOUTH DAILY  Susie MUNOZ TABITHA Mathews CNP   buPROPion (WELLBUTRIN XL) 150 MG extended release tablet Take 1 tablet by mouth 2 times daily  TABITHA Hopson CNP   buprenorphine-naloxone (SUBOXONE) 8-2 MG FILM SL film   Historical Provider, MD   escitalopram (LEXAPRO) 10 MG tablet Take 1 tablet by mouth daily  TABITHA Hopson CNP      Social History     Tobacco Use    Smoking status: Former Smoker     Years: 10.00     Types: Cigarettes    Smokeless tobacco: Never Used    Tobacco comment: 1 pack every two days   Substance Use Topics    Alcohol use: Yes     Comment: social      There were no vitals filed for this visit. Estimated body mass index is 31.99 kg/m² as calculated from the following:    Height as of 1/18/21: 5' (1.524 m). Weight as of 1/18/21: 163 lb 12.8 oz (74.3 kg). Physical Exam     ASSESSMENT/PLAN:  1. Essential hypertension  Worsening  Continue current therapy. DISCUSSED AND ADVISED TO:  Cut down on your salt intake. Cut down on caffeinated drinks, sports drinks. Instructed to check BP at home regularly. Report for any chest pains, shortness of breath, headaches, and lightheadedness. Call the office if your blood pressure continue to be higher than 140/90 or 90/50.      - lisinopril (PRINIVIL;ZESTRIL) 5 MG tablet; Take 1 tablet by mouth daily  Dispense: 90 tablet; Refill: 1    2. Lumbar radiculopathy, chronic  Stable  Continue current therapy. DISCUSSED AND ADVISED TO:  Use heat packs 15 to 20 mins every 2-3 hours. Do some back stretches as tolerated. Refer to hand out for instructions. Call for worsening, numbness, weakness. 3. Degeneration of cervical intervertebral disc  Stable  Continue current therapy. DISCUSSED and ADVISED TO:  Stay at a healthy weight. Continue exercises/PT  Stretch to help prevent stiffness and to prevent injury before exercise. Gentle forms of yoga help keep joints and muscles flexible. Walk instead of jog, ride a bike, swim, and water exercise.   Lift weights as tolerated. strong muscles help reduce stress on joints. Take pain medicines exactly as directed and only as needed. 4. Generalized anxiety disorder  Worsening  Continue current therapy. Discussed how to recognize anxiety. Advised to relieve tension with exercise or a massage. Advised to get enough rest.  Advised to avoid alcohol, caffeine, nicotine, and illegal drugs. Which can increase anxiety level and cause sleep problems.    - CBC Auto Differential; Future  - Comprehensive Metabolic Panel, Fasting; Future  - Lipid, Fasting; Future  - Urine Drug Screen; Future  - escitalopram (LEXAPRO) 10 MG tablet; Take 1 tablet by mouth daily  Dispense: 30 tablet; Refill: 3    5. Opioid use, unspecified with other opioid-induced disorder (HCC)(Suboxone)  Stable  Continue  monitor  - buprenorphine-naloxone (SUBOXONE) 8-2 MG FILM SL film  - Urine Drug Screen; Future    6. Smoker  Ongoing  Counseling given to quit smoking. Support offered. Hand out given. Educational material given  Patient declined despite discussion. 7. History of opiate therapy  Ongoing  Monitor  - buprenorphine-naloxone (SUBOXONE) 8-2 MG FILM SL film  - Urine Drug Screen; Future    8. Fatigue, unspecified type  Evaluate  - CBC Auto Differential; Future  - Comprehensive Metabolic Panel, Fasting; Future  - TSH without Reflex; Future  - Vitamin D 25 Hydroxy; Future    9. Elevated fasting blood sugar  Ongoing   Evaluate for metabolic disorders   and or vitamin deficiencies. - Comprehensive Metabolic Panel, Fasting; Future  - Hemoglobin A1C; Future  - Urinalysis Reflex to Culture; Future    10. Family history of colon cancer  historical    11. Family history of breast cancer in mother  historical    15. Family history of early CAD  histporical    15. Screening for lipid disorders    - Lipid, Fasting; Future    14. Need for varicella vaccine    - Varicella Zoster Antibody, IgG;  Future     Controlled Substance Monitoring:  Acute and Chronic Pain Monitoring:   RX Monitoring 1/16/2021   Attestation The Prescription Monitoring Report for this patient was reviewed today. Periodic Controlled Substance Monitoring Potential drug abuse or diversion identified, see note documentation. No orders of the defined types were placed in this encounter. No orders of the defined types were placed in this encounter. There are no discontinued medications. Health Maintenance Due   Topic Date Due    Varicella vaccine (1 of 2 - 2-dose childhood series) Never done    COVID-19 Vaccine (1) Never done    DTaP/Tdap/Td vaccine (1 - Tdap) Never done    Cervical cancer screen  Never done    Lipid screen  Never done    Breast cancer screen  Never done    Diabetes screen  Never done    Flu vaccine (1) 09/01/2021      No follow-ups on file. Alejandra received counseling on the following healthy behaviors: nutrition, exercise, medication adherence and tobacco cessation  Reviewed prior labs and health maintenance  Continue current medications, diet and exercise. Discussed use, benefit, and side effects of prescribed medications. Barriers to medication compliance addressed. Patient given educational materials - see patient instructions  Was a self-tracking handout given in paper form or via [employer]? Yes    Requested Prescriptions      No prescriptions requested or ordered in this encounter       All patient questions answered. Patient voiced understanding. Quality Measures    There is no height or weight on file to calculate BMI. Elevated. Weight control planned discussed Healthy diet and regular exercise. Blood pressure is high. Treatment plan consists of Antihypertensive Medication Started.     No results found for: LDLCALC, LDLCHOLESTEROL, LDLDIRECT (goal LDL reduction with dx if diabetes is 50% LDL reduction)      PHQ Scores 1/18/2021 12/21/2020   PHQ2 Score 0 0   PHQ9 Score 0 0     Interpretation of Total Score Depression Severity: 1-4 =

## 2021-11-22 ENCOUNTER — VIRTUAL VISIT (OUTPATIENT)
Dept: FAMILY MEDICINE CLINIC | Age: 41
End: 2021-11-22
Payer: COMMERCIAL

## 2021-11-22 DIAGNOSIS — F17.200 SMOKER: ICD-10-CM

## 2021-11-22 DIAGNOSIS — J30.9 CHRONIC ALLERGIC RHINITIS: Primary | ICD-10-CM

## 2021-11-22 PROCEDURE — 99442 PR PHYS/QHP TELEPHONE EVALUATION 11-20 MIN: CPT | Performed by: FAMILY MEDICINE

## 2021-11-22 RX ORDER — FLUTICASONE PROPIONATE 50 MCG
1 SPRAY, SUSPENSION (ML) NASAL DAILY
Qty: 16 G | Refills: 1 | Status: SHIPPED | OUTPATIENT
Start: 2021-11-22 | End: 2022-01-18

## 2021-11-22 RX ORDER — FERROUS SULFATE 325(65) MG
1 TABLET ORAL 2 TIMES DAILY
COMMUNITY
Start: 2021-11-10 | End: 2022-03-24

## 2021-11-22 RX ORDER — ASPIRIN 81 MG/1
1 TABLET ORAL DAILY
COMMUNITY
Start: 2021-11-10 | End: 2022-10-13 | Stop reason: HOSPADM

## 2021-11-22 RX ORDER — METHYLPREDNISOLONE 4 MG/1
TABLET ORAL
Qty: 1 KIT | Refills: 0 | Status: SHIPPED | OUTPATIENT
Start: 2021-11-22 | End: 2022-01-21

## 2021-11-22 RX ORDER — CETIRIZINE HYDROCHLORIDE 10 MG/1
10 TABLET ORAL DAILY
Qty: 30 TABLET | Refills: 0 | Status: SHIPPED | OUTPATIENT
Start: 2021-11-22 | End: 2021-12-22

## 2021-11-22 NOTE — PROGRESS NOTES
[facility] Physicians at [address] [address]   O: [phone]  X:[phone]    Deann Bonilla is a 39 y.o. female evaluated via telephone on 11/22/2021. CONSENT:  She and/or health care decision maker is aware that that she may receive a bill for this telephone service, depending on her insurance coverage, and has provided verbal consent to proceed: Yes    DOCUMENTATION:  Patient scheduled this appointment today due to Congestion  . ALLERGIC RHINITIS  Alejandra Prater is a 39 y.o. female patient reported some ongoing issues with nasal congestion, runny nose for the past 2 weeks. She also reports symptoms are slightly improving. No issues with her sense of smell or taste. No positive covid contact. She is also a known smoker. No fever or chills. No data recorded  I communicated with the patient and/or health care decision maker about: PLAN     Review of Systems  Details of this discussion including any medical advice provided: Under assessment. PHYSICAL EXAM[INSTRUCTIONS:  \"[x]\" Indicates a positive item  \"[]\" Indicates a negative item  -- DELETE ALL ITEMS NOT EXAMINED]  No flowsheet data found. Constitutional: [x] No apparent distress      [] Abnormal -   Mental status: [x] Alert and awake  [x] Oriented to person/place/time[] Abnormal -   Pulmonary/Chest: [x] Respiratory effort normal         [] Abnormal -          Psychiatric:       [] Normal Affect [] Abnormal -        [] No Hallucinations  Other pertinent observable physical exam findings:-mildly anxious  Controlled Substance Monitoring:  Acute and Chronic Pain Monitoring:   RX Monitoring 1/16/2021   Attestation The Prescription Monitoring Report for this patient was reviewed today. Periodic Controlled Substance Monitoring Potential drug abuse or diversion identified, see note documentation. ASSESSMENT  1.  Chronic allergic rhinitis  Failure to Improve  Continue with the same therapy   ADVISED TO:  Avoid known allergens/irritants. Stop smoking or avoid second hand smoke. Stay hydrated. Report for worsening symptoms    - cetirizine (ZYRTEC) 10 MG tablet; Take 1 tablet by mouth daily  Dispense: 30 tablet; Refill: 0  - fluticasone (FLONASE) 50 MCG/ACT nasal spray; 1 spray by Each Nostril route daily  Dispense: 16 g; Refill: 1  - methylPREDNISolone (MEDROL DOSEPACK) 4 MG tablet; Take by mouth. Dispense: 1 kit; Refill: 0    2. Smoker  Counseling given to quit smoking. Support offered. Total Time: minutes: 11-20 minutes  I affirm this is a Patient Initiated Episode with a Patient who has not had a related appointment within my department in the past 7 days or scheduled within the next 24 hours. Patient identification was verified at the start of the visit: Yes  Note: not billable if this call serves to triage the patient into an appointment for the relevant concern  No flowsheet data found. Patient Active Problem List   Diagnosis    Fatigue    Depression    Lumbar radiculopathy, chronic    Degeneration of cervical intervertebral disc    Gastro-esophageal reflux disease without esophagitis    Generalized anxiety disorder    Essential hypertension    Opioid use, unspecified with other opioid-induced disorder (HCC)(Suboxone)    History of opiate therapy    Smoker    Family history of colon cancer    Family history of breast cancer in mother    Family history of early CAD    Advanced maternal age in multigravida   [facility] Dichorionic diamniotic twin pregnancy, antepartum    H/O opioid abuse ([facility] [address]    Hx of preeclampsia, prior pregnancy, currently pregnant    Maternal antithrombin III deficiency complicating pregnancy ([facility] [address]    Pre-eclampsia in third trimester    Chronic allergic rhinitis     Alejandra Harding is a 39 y.o. female patient  being evaluated by a Virtual Visit (video visit) encounter to address concerns as mentioned above. A caregiver was present when appropriate.  Due to this being a TeleHealth encounter (During XVYFK-30 public health emergency), evaluation of the following organ systems was limited:Vitals/Constitutional/EENT/Resp/CV/GI//MS/Neuro/Skin/Heme-Lymph-Imm. Services were provided through a video synchronous discussion virtually to substitute for in-person clinic visit. This is a telehealth visit that was performed with the originating site at Patient Location: home and provider Location of [place], [place]. Pursuant to the emergency declaration under the [address] [place] [address], [address] [place] [address] authority and the [employer] Act, this Virtual Visit was conducted with patient's (and/or legal guardian's) consent, to reduce the patient's risk of exposure to COVID-19 and provide necessary medical care. The patient (and/or legal guardian) has also been advised to contact this office for worsening conditions or problems, and seek emergency medical treatment and/or call 911 if deemed necessary. This note was completed by using the assistance of a speech-recognition program. However, inadvertent computerized transcription errors may be present. Although every effort was made to ensure accuracy, no guarantees can be provided that every mistake has been identified and corrected by editing.   Electronically signed by TABITHA More CNP on 11/22/21 at 6:35 PM EST

## 2021-11-22 NOTE — PATIENT INSTRUCTIONS
Patient Education        Rhinitis: Care Instructions  Your Care Instructions  Rhinitis is swelling and irritation in the nose. Allergies and infections are often the cause. Your nose may run or feel stuffy. Other symptoms are itchy and sore eyes, ears, throat, and mouth. If allergies are the cause, your doctor may do tests to find out what you are allergic to. You may be able to stop symptoms if you avoid the things that cause them. Your doctor may suggest or prescribe medicine to ease your symptoms. Follow-up care is a key part of your treatment and safety. Be sure to make and go to all appointments, and call your doctor if you are having problems. It's also a good idea to know your test results and keep a list of the medicines you take. How can you care for yourself at home? · If your rhinitis is caused by allergies, try to find out what sets off (triggers) your symptoms. Take steps to avoid these triggers. ? Avoid yard work. It can stir up both pollen and mold. ? Do not smoke or allow others to smoke around you. If you need help quitting, talk to your doctor about stop-smoking programs and medicines. These can increase your chances of quitting for good. ? Do not use aerosol sprays, cleaning products, or perfumes. ? If pollen is one of your triggers, close your house and car windows during blooming season. ? Clean your house often to control dust.  ? Keep pets outside. · If your doctor recommends over-the-counter medicines to relieve symptoms, take your medicines exactly as prescribed. Call your doctor if you think you are having a problem with your medicine. · Use saline (saltwater) nasal washes to help keep your nasal passages open and wash out mucus and bacteria. You can buy saline nose drops at a grocery store or drugstore. Or you can make your own at home by adding 1 teaspoon of salt and 1 teaspoon of baking soda to 2 cups of distilled water.  If you make your own, fill a bulb syringe with the solution, insert the tip into your nostril, and squeeze gently. Kristy Spire your nose. When should you call for help? Call your doctor now or seek immediate medical care if:    · You are having trouble breathing. Watch closely for changes in your health, and be sure to contact your doctor if:    · Mucus from your nose gets thicker (like pus) or has new blood in it.     · You have new or worse symptoms.     · You do not get better as expected. Where can you learn more? Go to [email or web address] org and sign in to your [employer] account. Enter M030 in the [employer] box to learn more about \"Rhinitis: Care Instructions. \"     If you do not have an account, please click on the \"Sign Up Now\" link. Current as of: December 2, 2020               Content Version: 13.0  © 5325-8891 Healthwise, Incorporated. Care instructions adapted under license by [facility] ([facility]). If you have questions about a medical condition or this instruction, always ask your healthcare professional. [address] any warranty or liability for your use of this information.

## 2021-11-24 ENCOUNTER — HOSPITAL ENCOUNTER (OUTPATIENT)
Age: 41
Discharge: HOME | End: 2021-11-24
Payer: COMMERCIAL

## 2021-11-24 LAB
AMPHETAMINE SCREEN URINE: NEGATIVE
BARBITURATE SCREEN URINE: NEGATIVE
BENZODIAZEPINE SCREEN, URINE: NEGATIVE
BUPRENORPHINE URINE: NORMAL
CANNABINOID SCREEN URINE: NEGATIVE
COCAINE METABOLITE, URINE: NEGATIVE
MDMA URINE: NORMAL
METHADONE SCREEN, URINE: NEGATIVE
METHAMPHETAMINE, URINE: NORMAL
OPIATES, URINE: NEGATIVE
OXYCODONE SCREEN URINE: NEGATIVE
PHENCYCLIDINE, URINE: NEGATIVE
PROPOXYPHENE, URINE: NORMAL
TEST INFORMATION: NORMAL
TRICYCLIC ANTIDEPRESSANTS, UR: NORMAL

## 2021-11-24 PROCEDURE — G0480 DRUG TEST DEF 1-7 CLASSES: HCPCS

## 2021-11-24 PROCEDURE — 80307 DRUG TEST PRSMV CHEM ANLYZR: CPT

## 2021-11-26 LAB
BUPRENORPHINE GLUCURONIDE URINE: 55 NG/ML
BUPRENORPHINE URINE: <2 NG/ML
NALOXONE URINE: <100 NG/ML
NORBUPRENORPHINE GLUCURONIDE URINE: 199 NG/ML
NORBUPRENORPHINE, URINE: 93 NG/ML

## 2022-03-09 ENCOUNTER — TELEPHONE (OUTPATIENT)
Dept: FAMILY MEDICINE CLINIC | Age: 42
End: 2022-03-09

## 2022-03-09 DIAGNOSIS — Z12.31 BREAST CANCER SCREENING BY MAMMOGRAM: ICD-10-CM

## 2022-03-09 DIAGNOSIS — F11.988 OPIOID USE, UNSPECIFIED WITH OTHER OPIOID-INDUCED DISORDER (HCC): ICD-10-CM

## 2022-03-09 DIAGNOSIS — R63.5 WEIGHT GAIN: ICD-10-CM

## 2022-03-09 DIAGNOSIS — F11.11 H/O OPIOID ABUSE (HCC): ICD-10-CM

## 2022-03-09 DIAGNOSIS — R73.9 HYPERGLYCEMIA: ICD-10-CM

## 2022-03-09 DIAGNOSIS — Z92.29 HISTORY OF OPIATE THERAPY: ICD-10-CM

## 2022-03-09 DIAGNOSIS — E55.9 VITAMIN D DEFICIENCY: ICD-10-CM

## 2022-03-09 DIAGNOSIS — I10 ESSENTIAL HYPERTENSION: Primary | ICD-10-CM

## 2022-03-09 DIAGNOSIS — E78.00 ELEVATED LDL CHOLESTEROL LEVEL: ICD-10-CM

## 2022-03-09 PROCEDURE — 81003 URINALYSIS AUTO W/O SCOPE: CPT | Performed by: FAMILY MEDICINE

## 2022-03-09 NOTE — TELEPHONE ENCOUNTER
----- Message from Margaret Larson sent at 3/9/2022 10:47 AM EST -----  Subject: Referral Request    QUESTIONS   Reason for referral request? blood work for physical exam on 3/24   Has the physician seen you for this condition before? No   Preferred Specialist (if applicable)? Do you already have an appointment scheduled? Additional Information for Provider?   ---------------------------------------------------------------------------  --------------  CALL BACK INFO  What is the best way for the office to contact you? OK to leave message on   voicemail  Preferred Call Back Phone Number?  [phone]

## 2022-03-21 ENCOUNTER — HOSPITAL ENCOUNTER (OUTPATIENT)
Age: 42
Discharge: HOME | End: 2022-03-21
Payer: COMMERCIAL

## 2022-03-21 DIAGNOSIS — R73.9 HYPERGLYCEMIA: ICD-10-CM

## 2022-03-21 DIAGNOSIS — E78.00 ELEVATED LDL CHOLESTEROL LEVEL: ICD-10-CM

## 2022-03-21 DIAGNOSIS — E55.9 VITAMIN D DEFICIENCY: ICD-10-CM

## 2022-03-21 DIAGNOSIS — F11.11 H/O OPIOID ABUSE (HCC): ICD-10-CM

## 2022-03-21 DIAGNOSIS — R63.5 WEIGHT GAIN: ICD-10-CM

## 2022-03-21 LAB
ABSOLUTE EOS #: 0.2 K/UL (ref 0–0.4)
ABSOLUTE LYMPH #: 1.4 K/UL (ref 1–4.8)
ABSOLUTE MONO #: 0.4 K/UL (ref 0.1–1.3)
ALBUMIN SERPL-MCNC: 3.9 G/DL (ref 3.5–5.2)
ALP BLD-CCNC: 64 U/L (ref 35–104)
ALT SERPL-CCNC: 8 U/L (ref 5–33)
AMPHETAMINE SCREEN URINE: NEGATIVE
ANION GAP SERPL CALCULATED.3IONS-SCNC: 10 MMOL/L (ref 9–17)
AST SERPL-CCNC: 15 U/L
BACTERIA: NORMAL
BACTERIA: NORMAL
BARBITURATE SCREEN URINE: NEGATIVE
BASOPHILS # BLD: 0 % (ref 0–2)
BASOPHILS ABSOLUTE: 0 K/UL (ref 0–0.2)
BENZODIAZEPINE SCREEN, URINE: NEGATIVE
BILIRUB SERPL-MCNC: 0.35 MG/DL (ref 0.3–1.2)
BILIRUBIN URINE: NEGATIVE
BILIRUBIN URINE: NEGATIVE
BUN BLDV-MCNC: 6 MG/DL (ref 6–20)
CALCIUM SERPL-MCNC: 8.8 MG/DL (ref 8.6–10.4)
CANNABINOID SCREEN URINE: NEGATIVE
CASTS UA: NORMAL /LPF
CASTS UA: NORMAL /LPF
CHLORIDE BLD-SCNC: 105 MMOL/L (ref 98–107)
CHOLESTEROL, FASTING: 168 MG/DL
CHOLESTEROL/HDL RATIO: 3.6
CO2: 27 MMOL/L (ref 20–31)
COCAINE METABOLITE, URINE: NEGATIVE
COLOR: YELLOW
COLOR: YELLOW
CREAT SERPL-MCNC: 0.5 MG/DL (ref 0.5–0.9)
EOSINOPHILS RELATIVE PERCENT: 4 % (ref 0–4)
EPITHELIAL CELLS UA: NORMAL /HPF
EPITHELIAL CELLS UA: NORMAL /HPF
ESTIMATED AVERAGE GLUCOSE: 100 MG/DL
GFR AFRICAN AMERICAN: >60 ML/MIN
GFR NON-AFRICAN AMERICAN: >60 ML/MIN
GFR SERPL CREATININE-BSD FRML MDRD: ABNORMAL ML/MIN/{1.73_M2}
GLUCOSE FASTING: 93 MG/DL (ref 70–99)
GLUCOSE URINE: NEGATIVE
GLUCOSE URINE: NEGATIVE
HBA1C MFR BLD: 5.1 % (ref 4–6)
HCT VFR BLD CALC: 38.1 % (ref 36–46)
HDLC SERPL-MCNC: 47 MG/DL
HEMOGLOBIN: 12.4 G/DL (ref 12–16)
KETONES, URINE: NEGATIVE
KETONES, URINE: NEGATIVE
LDL CHOLESTEROL: 98 MG/DL (ref 0–130)
LEUKOCYTE ESTERASE, URINE: NEGATIVE
LEUKOCYTE ESTERASE, URINE: NEGATIVE
LYMPHOCYTES # BLD: 30 % (ref 24–44)
MCH RBC QN AUTO: 30.2 PG (ref 26–34)
MCHC RBC AUTO-ENTMCNC: 32.6 G/DL (ref 31–37)
MCV RBC AUTO: 92.7 FL (ref 80–100)
METHADONE SCREEN, URINE: NEGATIVE
MONOCYTES # BLD: 8 % (ref 1–7)
NITRITE, URINE: NEGATIVE
NITRITE, URINE: NEGATIVE
OPIATES, URINE: NEGATIVE
OXYCODONE SCREEN URINE: NEGATIVE
PDW BLD-RTO: 14.2 % (ref 11.5–14.9)
PH UA: 7.5 (ref 5–8)
PH UA: 7.5 (ref 5–8)
PHENCYCLIDINE, URINE: NEGATIVE
PLATELET # BLD: 210 K/UL (ref 150–450)
PMV BLD AUTO: 9.3 FL (ref 6–12)
POTASSIUM SERPL-SCNC: 4.1 MMOL/L (ref 3.7–5.3)
PROTEIN UA: NEGATIVE
PROTEIN UA: NEGATIVE
RBC # BLD: 4.12 M/UL (ref 4–5.2)
RBC UA: NORMAL /HPF
RBC UA: NORMAL /HPF
SEG NEUTROPHILS: 58 % (ref 36–66)
SEGMENTED NEUTROPHILS ABSOLUTE COUNT: 2.8 K/UL (ref 1.3–9.1)
SODIUM BLD-SCNC: 142 MMOL/L (ref 135–144)
SPECIFIC GRAVITY UA: 1.01 (ref 1–1.03)
SPECIFIC GRAVITY UA: 1.01 (ref 1–1.03)
TEST INFORMATION: NORMAL
TOTAL PROTEIN: 6.1 G/DL (ref 6.4–8.3)
TRIGLYCERIDE, FASTING: 113 MG/DL
TSH SERPL DL<=0.05 MIU/L-ACNC: 1.73 MIU/L (ref 0.3–5)
TURBIDITY: CLEAR
TURBIDITY: CLEAR
URINE HGB: ABNORMAL
URINE HGB: ABNORMAL
UROBILINOGEN, URINE: NORMAL
UROBILINOGEN, URINE: NORMAL
VITAMIN D 25-HYDROXY: 15.2 NG/ML
WBC # BLD: 4.7 K/UL (ref 3.5–11)
WBC UA: NORMAL /HPF
WBC UA: NORMAL /HPF

## 2022-03-21 PROCEDURE — 81001 URINALYSIS AUTO W/SCOPE: CPT

## 2022-03-21 PROCEDURE — 82306 VITAMIN D 25 HYDROXY: CPT

## 2022-03-21 PROCEDURE — G0480 DRUG TEST DEF 1-7 CLASSES: HCPCS

## 2022-03-21 PROCEDURE — 80053 COMPREHEN METABOLIC PANEL: CPT

## 2022-03-21 PROCEDURE — 84443 ASSAY THYROID STIM HORMONE: CPT

## 2022-03-21 PROCEDURE — 83036 HEMOGLOBIN GLYCOSYLATED A1C: CPT

## 2022-03-21 PROCEDURE — 80061 LIPID PANEL: CPT

## 2022-03-21 PROCEDURE — 80307 DRUG TEST PRSMV CHEM ANLYZR: CPT

## 2022-03-21 PROCEDURE — 85025 COMPLETE CBC W/AUTO DIFF WBC: CPT

## 2022-03-21 PROCEDURE — 36415 COLL VENOUS BLD VENIPUNCTURE: CPT

## 2022-03-21 NOTE — PROGRESS NOTES
[facility] PHYSICIANS  [facility] FAMILY PHYSICIANS  JOVANNA [facility]  [address] [id number]  Dept: [id number] (:  1980) is a 39 y.o. female. Patient is here for evaluation of the following chief complaint(s):  Chief Complaint   Patient presents with    Annual Exam        SUBJECTIVE/OBJECTIVE:  SUSANA Grewal is a 39 y.o. female patient. Patient is an established patient of mine. Patient has a known history of hypertension, protein malnutrition, chronic allergic rhinitis, vitamin D deficiency, opioid dependence, and obesity. RECENT BIRTH/STATUS POST BILATERAL SALPINGECTOMY  Patient had a twins- 8 months- boy and girl  Also had salpingectomy in January  She stated they for follow-up on her chronic conditions. HYPERTENSION  Alejandra Eagle has a well controlled hypertension. she is currently on no current therapy she was on nifedipine while pregnant. Patient's most recent BP in the office was stable. she reports stable BP readings at home. Patient denies any adverse reaction to this therapy. she denies any CP, SOB, HA, or palpitations. Patient admits to exercising and adheres to low salt diet. No history of organ damage due to condition noted. Lab Results   Component Value Date/Time    CREATININE 0.50 2022 09:14 AM     ALLERGIC RHINITIS  Alejandra Eagle is a 39 y.o. female patient reported some ongoing issues with nasal congestion, runny nose for the past 2 weeks. She also reports symptoms are slightly improving. Patient will be restarted on Zyrtec and Flonase. No issues with her sense of smell or taste. She is also a known smoker. ANEMIA--improved on no supplements  Alejandra Eagle is a 39 y.o. female patient with a known Iron deficiency anemia.   Associated signs & symptoms:   Lab Results   Component Value Date/Time    HGB 12.4 2022 09:14 AM    HCT 38.1 2022 09:14 AM      PROTEIN- Darsree Whitney is a 39 y.o. female patient currently being treated for condition with some Protein shakes. Doing well. Lab Results   Component Value Date/Time    LABALBU 3.9 03/21/2022 09:14 AM    LABALBU 4.0 10/08/2021 04:00 PM    PROT 6.1 (L) 03/21/2022 09:14 AM    PROT 6.5 10/08/2021 04:00 PM     VITAMIN D  Patient has a known Vitamin D Deficiency. she had a course of supplementation for 12 weeks. she is due to get levels check. We continue to discuss ways to manage this condition. We also discussed ways to improve the levels. Such as learning food groups that are high in Vitamin D. We also discuss that sun exposure is beneficial however, too much sun and sun damage can potentially result in skin cancer. Lab Results   Component Value Date/Time    VITD25 15.2 (L) 03/21/2022 09:14 AM      ANXIETY/ Malena Rodriguez has a worsening history of Anxiety. Patient continues to get Suboxone therapy however, she had been tapering off on her dose. Symptoms includes  below on screening. It includes feeling nervous, not able to stop worrying, and feeling restless and irritable. Symptoms have been going on a long time. Clinical course gradually worsening. Previous treatment includes wellbutrin and xanax--stopped both therapy, which provided no relief. Patient denies current suicidal and homicidal ideation/intent. PHQ-2 Over the past 2 weeks, how often have you been bothered by any of the following problems? Little interest or pleasure in doing things: Not at all  Feeling down, depressed, or hopeless: Not at all  PHQ-2 Score: 0  PHQ-9 Over the past 2 weeks, how often have you been bothered by any of the following problems?   PHQ-9 Total Score: 0  PHQ-9 Total Score: 0  TULIO-7 SCREENING 1/18/2021 12/21/2020   Feeling nervous, anxious, or on edge 1-Several days 2-Over half the days   Not able to stop or control worrying 1-Several days 2-Over half the days   Worrying too much about different things 1-Several days 2-Over half the days   Trouble relaxing 1-Several days 0-Not at all   Being so restless that it's hard to sit still 1-Several days 2-Over half the days   Becoming easily annoyed or irritable 1-Several days 2-Over half the days   Feeling afraid as if something awful might happen 1-Several days 0-Not at all   TULIO-7 Total Score 7 10       DEPRESSION SCREENING: Negative  PHQ Scores 3/24/2022 1/18/2021 12/21/2020   PHQ2 Score 0 0 0   PHQ9 Score 0 0 0     Patient's blood count is WNL. Lab Results   Component Value Date    WBC 4.7 03/21/2022    HGB 12.4 03/21/2022    HCT 38.1 03/21/2022    MCV 92.7 03/21/2022     03/21/2022    LYMPHOPCT 30 03/21/2022    RBC 4.12 03/21/2022    MCH 30.2 03/21/2022    MCHC 32.6 03/21/2022    RDW 14.2 03/21/2022       THYROID FUNCTION  Rosita A Clydie Saint  's most recent TSH level was normal. Results reviewed with the patient. Patient denies any history of thyroid disorders. Lab Results   Component Value Date/Time    TSH 1.73 03/21/2022 09:14 AM        Alejandra 's RENAL FUNCTION was found WNL. Lab Results   Component Value Date     03/21/2022    K 4.1 03/21/2022     03/21/2022    CO2 27 03/21/2022    BUN 6 03/21/2022    CREATININE 0.50 03/21/2022    GLUCOSE 114 (H) 10/08/2021    CALCIUM 8.8 03/21/2022    PROT 6.1 (L) 03/21/2022    GFR      03/21/2022       LFT's low proteins  Lab Results   Component Value Date/Time    ALKPHOS 64 03/21/2022 09:14 AM    ALT 8 03/21/2022 09:14 AM    AST 15 03/21/2022 09:14 AM    BILITOT 0.35 03/21/2022 09:14 AM    PROT 6.1 (L) 03/21/2022 09:14 AM    LABALBU 3.9 03/21/2022 09:14 AM      Patient's urinalysis results .   Lab Results   Component Value Date    COLORU Yellow 03/21/2022    NITRU NEGATIVE 03/21/2022    LEUKOCYTESUR NEGATIVE 03/21/2022    GLUCOSEU NEGATIVE 03/21/2022    GLUCOSEU NEGATIVE 05/01/2012    KETUA NEGATIVE 03/21/2022    UROBILINOGEN Normal 03/21/2022    BILIRUBINUR NEGATIVE 03/21/2022    BILIRUBINUR NEGATIVE 05/01/2012      No results found for: CULTURE, SPECDESC, SPECIAL    Patient's Hepatitis C screening came back negative. Lab Results   Component Value Date/Time    HEPCAB NONREACTIVE 10/08/2021 04:00 PM       Patient's HIV screening results came back negative. Lab Results   Component Value Date/Time    HIVAG/AB NONREACTIVE 10/08/2021 04:00 PM       WEIGHT  Patient's BMI is Body mass index is 31.83 kg/m². kg/m2. BMI is stable. Patient understands that this condition increases the patient's risk for chronic conditions. Wt Readings from Last 3 Encounters:   03/24/22 163 lb (73.9 kg)   01/18/21 163 lb 12.8 oz (74.3 kg)   12/21/20 160 lb 12.8 oz (72.9 kg)       VITAL SIGNS:  Vitals:    03/24/22 0958   BP: 126/80   Pulse: 73   Temp: 97.7 °F (36.5 °C)   SpO2: 98%   Weight: 163 lb (73.9 kg)   Height: 5' (1.524 m)   Estimated body mass index is 31.83 kg/m² as calculated from the following:    Height as of this encounter: 5' (1.524 m). Weight as of this encounter: 163 lb (73.9 kg). Review of Systems   Constitutional: Negative for chills and fever. HENT: Positive for postnasal drip and rhinorrhea. Respiratory: Negative. Negative for shortness of breath and wheezing. Cardiovascular: Positive for leg swelling. Negative for chest pain and palpitations. Gastrointestinal: Negative for abdominal pain. Endocrine: Negative. Musculoskeletal: Negative for arthralgias. Allergic/Immunologic: Positive for environmental allergies. Neurological: Negative for headaches. Psychiatric/Behavioral: Negative for sleep disturbance and suicidal ideas. The patient is nervous/anxious. Physical Exam  Vitals and nursing note reviewed. Constitutional:       Appearance: Normal appearance. HENT:      Head: Normocephalic. Nose: Nose normal.   Cardiovascular:      Rate and Rhythm: Normal rate and regular rhythm. Pulmonary:      Effort: Pulmonary effort is normal.      Breath sounds: Normal breath sounds. Musculoskeletal:      Right lower leg: Edema present. Skin:     General: Skin is warm and dry. Neurological:      Mental Status: She is alert and oriented to person, place, and time. Psychiatric:         Mood and Affect: Mood is anxious. Speech: Speech is rapid and pressured. Thought Content: Thought content does not include suicidal ideation. MEDICAL HISTORY      Diagnosis Date    Anxiety 2/13/2015    Degeneration of cervical intervertebral disc 12/21/2020    Depression 12/5/2012    Essential hypertension 12/21/2020    Gastro-esophageal reflux disease without esophagitis 12/21/2020    Lumbar radiculopathy, acute 2/27/2013    Opioid use, unspecified with other opioid-induced disorder (HCC)(Suboxone) 12/21/2020      MEDICATIONS  Prior to Visit Medications    Medication Sig Taking? Authorizing Provider   fluticasone (FLONASE) 50 MCG/ACT nasal spray INSTILL 1 SPRAY BY EACH NOSTRIL ROUTE DAILY Yes TABITHA Hopson CNP   cetirizine (ZYRTEC) 10 MG tablet Take 1 tablet by mouth daily Yes TABITHA Hopson - CNP   Prenatal Vit-Fe Fumarate-FA (PRENATAL VITAMINS) 28-0.8 MG TABS Take 1 tablet by mouth daily Yes Susie Mathews APRN - CNP   vitamin D (CHOLECALCIFEROL) 25 MCG (1000 UT) TABS tablet Take 1 tablet by mouth daily Yes TABITHA Hopson CNP   Nutritional Supplements (NUTRITIONAL SHAKE HIGH PROTEIN) LIQD Take 1 Units by mouth daily Yes TABITHA Hopson - CNP   aspirin 81 MG EC tablet Take 1 tablet by mouth daily Yes Historical Provider, MD   buprenorphine-naloxone (SUBOXONE) 8-2 MG FILM SL film  Yes Historical Provider, MD     Controlled Substance Monitoring:  Acute and Chronic Pain Monitoring:   RX Monitoring 3/24/2022   Attestation -   Periodic Controlled Substance Monitoring No signs of potential drug abuse or diversion identified. ASSESSMENT/PLAN:  1. Encounter for annual health examination  Stable      2. Essential hypertension  Stable  Continue current therapy.   DISCUSSED AND ADVISED TO:  Cut down on your salt intake. Cut down on caffeinated drinks, sports drinks. Instructed to check BP at home regularly. Report for any chest pains, shortness of breath, headaches, and lightheadedness. Call the office if your blood pressure continue to be higher than 140/90 or 90/50. 3. Protein malnutrition ([facility]  Stable  Start protein shakes  Continue to evaluate    - Prenatal Vit-Fe Fumarate-FA (PRENATAL VITAMINS) 28-0.8 MG TABS; Take 1 tablet by mouth daily  Dispense: 90 tablet; Refill: 2  - Nutritional Supplements (NUTRITIONAL SHAKE HIGH PROTEIN) LIQD; Take 1 Units by mouth daily  Dispense: 30 each; Refill: 2    4. Chronic allergic rhinitis  Failure to Improve  Continue with the same therapy   ADVISED TO:  Avoid known allergens/irritants. Stop smoking or avoid second hand smoke. Stay hydrated. Report for worsening symptoms    - fluticasone (FLONASE) 50 MCG/ACT nasal spray; INSTILL 1 SPRAY BY EACH NOSTRIL ROUTE DAILY  Dispense: 16 g; Refill: 2  - cetirizine (ZYRTEC) 10 MG tablet; Take 1 tablet by mouth daily  Dispense: 30 tablet; Refill: 0    5. Vitamin D deficiency  Stable  Continue Vitamin D supplementation  DISCUSSED AND ADVISED TO:  Foods that contain a lot of vitamin D includes [place], tuna, and mackerel. Cheese, egg yolks, and beef liver have small amounts of vit D.  Milk, soy drinks, orange juice, yogurt, margarine, and some kinds of cereal have vitamin D added to them. Continue to use sunblock when out in the sun to prevent skin cancer.    - vitamin D (CHOLECALCIFEROL) 25 MCG (1000 UT) TABS tablet; Take 1 tablet by mouth daily  Dispense: 90 tablet; Refill: 5    6. Opioid dependence on agonist therapy ([facility]  Stable  . Continue to evaluate  Continue current therapy      7. Peripheral edema  Failure to Improve  DISCUSSED AND ADVISED TO:  Raise legs above the swollen area when resting. Take frequent breaks from standing and sitting positions. Walk around to promote blood flow to legs.   Cut down salt on diet. 8. H/O bilateral salpingectomy  Stable  historical    9. Class 1 obesity due to excess calories with serious comorbidity and body mass index (BMI) of 31.0 to 31.9 in adult  Failure to Improve  BMI stable  DISCUSSED AND ADVISED TO:  Eat a low-fat and low carbohydrates diet. Avoid fried foods especially fast food. Choose healthier options for snacks. Have 5-6 servings of fruits and vegetables per day. Cut down on eating processed food. Add 30 minutes to 1 hour aerobic exercise for 3-4 days a week. On this date 3/24/2022 I have spent 45 minutes reviewing previous notes, test results and face to face with the patient discussing the diagnosis and importance of compliance with the treatment plan as well as documenting on the day of the visit. Return in about 4 months (around 7/24/2022) for Chronic conditions, 30mins. This note was completed by using the assistance of a speech-recognition program. However, inadvertent computerized transcription errors may be present. Although every effort was made to ensure accuracy, no guarantees can be provided that every mistake has been identified and corrected by editing.   Electronically signed by TABITHA Parekh CNP on 3/21/22 at 10:35 AM EDT     --TABITHA Parekh CNP

## 2022-03-23 LAB
ALCOHOL URINE: NEGATIVE
AMPHETAMINE SCREEN URINE: NEGATIVE
BARBITURATE SCREEN URINE: NEGATIVE
BENZODIAZEPINE SCREEN, URINE: NEGATIVE
CANNABINOID SCREEN URINE: NEGATIVE
COCAINE METABOLITE, URINE: NEGATIVE
METHADONE SCREEN, URINE: NEGATIVE
OPIATES, URINE: NEGATIVE
OXYCODONE SCREEN URINE: NEGATIVE
PHENCYCLIDINE, URINE: NEGATIVE
TEST INFORMATION: NORMAL

## 2022-03-24 ENCOUNTER — OFFICE VISIT (OUTPATIENT)
Dept: FAMILY MEDICINE CLINIC | Age: 42
End: 2022-03-24
Payer: COMMERCIAL

## 2022-03-24 VITALS
BODY MASS INDEX: 32 KG/M2 | TEMPERATURE: 97.7 F | WEIGHT: 163 LBS | HEIGHT: 60 IN | OXYGEN SATURATION: 98 % | HEART RATE: 73 BPM | DIASTOLIC BLOOD PRESSURE: 80 MMHG | SYSTOLIC BLOOD PRESSURE: 126 MMHG

## 2022-03-24 DIAGNOSIS — I10 ESSENTIAL HYPERTENSION: ICD-10-CM

## 2022-03-24 DIAGNOSIS — F11.20 OPIOID DEPENDENCE ON AGONIST THERAPY (HCC): ICD-10-CM

## 2022-03-24 DIAGNOSIS — J30.9 CHRONIC ALLERGIC RHINITIS: ICD-10-CM

## 2022-03-24 DIAGNOSIS — Z90.79 H/O BILATERAL SALPINGECTOMY: ICD-10-CM

## 2022-03-24 DIAGNOSIS — E55.9 VITAMIN D DEFICIENCY: ICD-10-CM

## 2022-03-24 DIAGNOSIS — E46 PROTEIN MALNUTRITION (HCC): ICD-10-CM

## 2022-03-24 DIAGNOSIS — E66.09 CLASS 1 OBESITY DUE TO EXCESS CALORIES WITH SERIOUS COMORBIDITY AND BODY MASS INDEX (BMI) OF 31.0 TO 31.9 IN ADULT: ICD-10-CM

## 2022-03-24 DIAGNOSIS — Z00.00 ENCOUNTER FOR ANNUAL HEALTH EXAMINATION: Primary | ICD-10-CM

## 2022-03-24 DIAGNOSIS — R60.9 PERIPHERAL EDEMA: ICD-10-CM

## 2022-03-24 PROBLEM — R60.0 PERIPHERAL EDEMA: Status: ACTIVE | Noted: 2022-03-24

## 2022-03-24 PROCEDURE — 99396 PREV VISIT EST AGE 40-64: CPT | Performed by: FAMILY MEDICINE

## 2022-03-24 PROCEDURE — G8482 FLU IMMUNIZE ORDER/ADMIN: HCPCS | Performed by: FAMILY MEDICINE

## 2022-03-24 RX ORDER — PNV NO.95/FERROUS FUM/FOLIC AC 28MG-0.8MG
1 TABLET ORAL DAILY
Qty: 90 TABLET | Refills: 2 | Status: SHIPPED | OUTPATIENT
Start: 2022-03-24

## 2022-03-24 RX ORDER — FLUTICASONE PROPIONATE 50 MCG
SPRAY, SUSPENSION (ML) NASAL
Qty: 16 G | Refills: 2 | Status: SHIPPED | OUTPATIENT
Start: 2022-03-24 | End: 2022-07-18

## 2022-03-24 RX ORDER — CETIRIZINE HYDROCHLORIDE 10 MG/1
10 TABLET ORAL DAILY
Qty: 30 TABLET | Refills: 0 | Status: SHIPPED | OUTPATIENT
Start: 2022-03-24 | End: 2022-04-23

## 2022-03-24 SDOH — ECONOMIC STABILITY: TRANSPORTATION INSECURITY
IN THE PAST 12 MONTHS, HAS LACK OF TRANSPORTATION KEPT YOU FROM MEETINGS, WORK, OR FROM GETTING THINGS NEEDED FOR DAILY LIVING?: NO

## 2022-03-24 SDOH — ECONOMIC STABILITY: INCOME INSECURITY: IN THE LAST 12 MONTHS, WAS THERE A TIME WHEN YOU WERE NOT ABLE TO PAY THE MORTGAGE OR RENT ON TIME?: NO

## 2022-03-24 SDOH — ECONOMIC STABILITY: FOOD INSECURITY: WITHIN THE PAST 12 MONTHS, YOU WORRIED THAT YOUR FOOD WOULD RUN OUT BEFORE YOU GOT MONEY TO BUY MORE.: NEVER TRUE

## 2022-03-24 SDOH — ECONOMIC STABILITY: FOOD INSECURITY: WITHIN THE PAST 12 MONTHS, THE FOOD YOU BOUGHT JUST DIDN'T LAST AND YOU DIDN'T HAVE MONEY TO GET MORE.: NEVER TRUE

## 2022-03-24 SDOH — ECONOMIC STABILITY: HOUSING INSECURITY
IN THE LAST 12 MONTHS, WAS THERE A TIME WHEN YOU DID NOT HAVE A STEADY PLACE TO SLEEP OR SLEPT IN A SHELTER (INCLUDING NOW)?: NO

## 2022-03-24 SDOH — ECONOMIC STABILITY: TRANSPORTATION INSECURITY
IN THE PAST 12 MONTHS, HAS THE LACK OF TRANSPORTATION KEPT YOU FROM MEDICAL APPOINTMENTS OR FROM GETTING MEDICATIONS?: NO

## 2022-03-24 ASSESSMENT — PATIENT HEALTH QUESTIONNAIRE - PHQ9
SUM OF ALL RESPONSES TO PHQ QUESTIONS 1-9: 0
1. LITTLE INTEREST OR PLEASURE IN DOING THINGS: 0
SUM OF ALL RESPONSES TO PHQ QUESTIONS 1-9: 0
SUM OF ALL RESPONSES TO PHQ9 QUESTIONS 1 & 2: 0
SUM OF ALL RESPONSES TO PHQ QUESTIONS 1-9: 0
SUM OF ALL RESPONSES TO PHQ QUESTIONS 1-9: 0
2. FEELING DOWN, DEPRESSED OR HOPELESS: 0

## 2022-03-24 ASSESSMENT — ENCOUNTER SYMPTOMS
RESPIRATORY NEGATIVE: 1
SHORTNESS OF BREATH: 0
WHEEZING: 0
RHINORRHEA: 1
ABDOMINAL PAIN: 0

## 2022-03-24 ASSESSMENT — SOCIAL DETERMINANTS OF HEALTH (SDOH): HOW HARD IS IT FOR YOU TO PAY FOR THE VERY BASICS LIKE FOOD, HOUSING, MEDICAL CARE, AND HEATING?: NOT HARD AT ALL

## 2022-03-24 NOTE — PATIENT INSTRUCTIONS
Patient Education        Learning About Vitamin D  Why is it important to get enough vitamin D? Your body needs vitamin D to absorb calcium. Calcium keeps your bones and muscles, including your heart, healthy and strong. If your muscles don't get enough calcium, they can cramp, hurt, or feel weak. You may have long-term (chronic) muscle aches and pains. If you don't get enough vitamin D throughout life, you have an increased chance of having thin and brittle bones (osteoporosis) in your later years. Children who don't get enough vitamin D may not grow as much as others their age. They also have a chance of getting a rare disease called rickets. It causes weak bones. Vitamin D and calcium are added to many foods. And your body uses sunshine to make its own vitamin D. How much vitamin D do you need? The recommended dietary allowance (RDA) for vitamin D is 600 IU (international units) every day for people ages 3 through 79. Adults 71 and older need 800 IU every day. How can you get more vitamin D? Foods that contain vitamin D include:  · [place], tuna, and mackerel. These are some of the best foods to eat when you need to get more vitamin D.  · Cheese, egg yolks, and beef liver. These foods have vitamin D in small amounts. · Milk, soy drinks, orange juice, yogurt, margarine, and some kinds of cereal have vitamin D added to them. Some people don't make vitamin D as well as others. They may have to take extra care in getting enough vitamin D. Things that reduce how much vitamin D your body makes include:  · Having dark skin. · Age, especially if you are older than 72. · Digestive problems, such as Crohn's or celiac disease. · Liver and kidney disease. Some people who do not get enough vitamin D may need supplements. Are there any risks from taking vitamin D?  · Too much vitamin D:  ? Can damage your kidneys. ? Can cause nausea and vomiting, constipation, and weakness.   ? Raises the amount of calcium in

## 2022-03-26 LAB
BUPRENORPHINE GLUCURONIDE URINE: 160 NG/ML
BUPRENORPHINE URINE: <2 NG/ML
FENTANYL AND METABOLITES, URINE: <1 NG/ML
GABAPENTIN QNT URINE: <5 UG/ML
NALOXONE URINE: <100 NG/ML
NORBUPRENORPHINE GLUCURONIDE URINE: 429 NG/ML
NORBUPRENORPHINE, URINE: 186 NG/ML
NORFENTANYL, URINE: <1 NG/ML

## 2022-05-09 ENCOUNTER — HOSPITAL ENCOUNTER (OUTPATIENT)
Dept: WOMENS IMAGING | Age: 42
Discharge: HOME | End: 2022-05-11
Payer: COMMERCIAL

## 2022-05-09 DIAGNOSIS — Z12.31 VISIT FOR SCREENING MAMMOGRAM: ICD-10-CM

## 2022-05-09 PROCEDURE — 77063 BREAST TOMOSYNTHESIS BI: CPT

## 2022-05-23 NOTE — PROGRESS NOTES
[facility] PHYSICIANS  [facility] FAMILY PHYSICIANS [facility].  [address] [address]  Dept: Marys Ian (:  1980) is a 39 y.o. female. Patient is here for evaluation of the following chief complaint(s):  Chief Complaint   Patient presents with    Back Pain     lower back    Neck Pain     pt feels a lump in neck        SUBJECTIVE/OBJECTIVE:  SUSANA Araiza is a 39 y.o. female patient. Patient is an established patient of mine. Patient has a known history of hypertension, low back pain, chronic neck pain, knee pain, on suboxone and presented with hypertension. Old injury, may have exacerbated at home, 5 months old, 1 months old. Shaina Smith has a well controlled hypertension. she is currently on lisinopril- initial therapy. Patient's most recent BP in the office was stable. she reports stable BP readings at home. Patient denies any adverse reaction to this therapy. she denies any CP, SOB, HA, or palpitations. Patient admits to exercising and adheres to low salt diet. No history of organ damage due to condition noted. Lab Results   Component Value Date/Time    CREATININE 0.50 2022 09:14 AM     CERVICAL SPINE  Patient complains of some chronic neck pain. Patient states that she has had this for a while. She has been having this for few months and have noticed some worsening pain. Patient reported a previous relationship with a pain management doctor and have had a few injections which she is not able to elaborate. She is not currently on any type of anti-inflammatory or muscle relaxants. She denies any bowel or bladder issues. No pain radiations no weakness or falls. LUMBAR SPINE  Patient reports mid low back pain she describes as achy. She does report some pain radiation to the right buttocks to the right knee. She also reports some chronic right knee pain.   She has had a previous x-rays in the past this is not visible to us today during the visit. Despite having been established with a pain management doctor patient would like to see another group of pain management doctors within [facility] ([facility] Patient has been using some heat packs. She is currently on Suboxone due to a previous opiate use. She denies any relief with home remedies. DEPRESSION SCREENING: Negative  PHQ Scores 5/24/2022 3/24/2022 1/18/2021 12/21/2020   PHQ2 Score 0 0 0 0   PHQ9 Score 0 0 0 0     VITAL SIGNS:  Vitals:    05/24/22 1058 05/24/22 1104 05/24/22 1132   BP: (!) 163/104 (!) 154/107 (!) 149/103   Site: Left Upper Arm Right Upper Arm Left Upper Arm   Position: Sitting Sitting Sitting   Cuff Size: Medium Adult Medium Adult Medium Adult   Pulse: 72     Temp: 98.1 °F (36.7 °C)     TempSrc: Temporal     SpO2: 99%     Weight: 156 lb (70.8 kg)     Height: 5' (1.524 m)     Estimated body mass index is 30.47 kg/m² as calculated from the following:    Height as of this encounter: 5' (1.524 m). Weight as of this encounter: 156 lb (70.8 kg). Review of Systems   Constitutional: Negative for chills and fever. Respiratory: Negative. Negative for shortness of breath and wheezing. Cardiovascular: Negative for chest pain and palpitations. Gastrointestinal: Negative for abdominal pain. Endocrine: Negative. Musculoskeletal: Positive for arthralgias, back pain, gait problem, myalgias, neck pain and neck stiffness. Allergic/Immunologic: Positive for environmental allergies. Neurological: Negative for headaches. Psychiatric/Behavioral: Positive for sleep disturbance. Negative for suicidal ideas. The patient is nervous/anxious. Physical Exam  Vitals and nursing note reviewed. Constitutional:       Appearance: Normal appearance. HENT:      Head: Normocephalic. Nose: Nose normal.   Cardiovascular:      Rate and Rhythm: Normal rate and regular rhythm.    Pulmonary:      Effort: Pulmonary effort is normal.      Breath sounds: Normal breath sounds. Musculoskeletal:      Cervical back: No spasms. Decreased range of motion. Lumbar back: Spasms present. Decreased range of motion. Positive right straight leg raise test. Negative left straight leg raise test.   Skin:     General: Skin is warm and dry. Neurological:      Mental Status: She is alert and oriented to person, place, and time. Psychiatric:         Mood and Affect: Mood is anxious. Speech: Speech is rapid and pressured. Thought Content: Thought content does not include suicidal ideation. MEDICAL HISTORY      Diagnosis Date    Anxiety 2/13/2015    Chronic midline low back pain with right-sided sciatica 5/24/2022    Degeneration of cervical intervertebral disc 12/21/2020    Depression 12/5/2012    Essential hypertension 12/21/2020    Gastro-esophageal reflux disease without esophagitis 12/21/2020    Lumbar radiculopathy, acute 2/27/2013    Opioid use, unspecified with other opioid-induced disorder (HCC)(Suboxone) 12/21/2020      MEDICATIONS  Prior to Visit Medications    Medication Sig Taking?  Authorizing Provider   predniSONE (DELTASONE) 10 MG tablet Take 4 tabs X 3 days, then 3 tabs X 3 days, then 2 tabs X 3 days, then 1 tab X 3 days Yes TABITHA Hopson CNP   tiZANidine (ZANAFLEX) 2 MG tablet 1-2 tablets at night Yes TABITHA Hopson CNP   naproxen (NAPROSYN) 500 MG tablet Take 1 tablet by mouth 2 times daily (with meals) Yes TABITHA Hopson CNP   lisinopril (PRINIVIL;ZESTRIL) 5 MG tablet Take 0.5 tablets by mouth daily Yes TABITHA Hopson CNP   naloxone 4 MG/0.1ML LIQD nasal spray 1 spray by Nasal route as needed Yes Historical Provider, MD   fluticasone (FLONASE) 50 MCG/ACT nasal spray INSTILL 1 SPRAY BY EACH NOSTRIL ROUTE DAILY Yes TABITHA Hopson CNP   Prenatal Vit-Fe Fumarate-FA (PRENATAL VITAMINS) 28-0.8 MG TABS Take 1 tablet by mouth daily Yes TABITHA Hopson CNP vitamin D (CHOLECALCIFEROL) 25 MCG (1000 UT) TABS tablet Take 1 tablet by mouth daily Yes TABITHA Hopson CNP   Nutritional Supplements (NUTRITIONAL SHAKE HIGH PROTEIN) LIQD Take 1 Units by mouth daily Yes TABITHA Hopson CNP   aspirin 81 MG EC tablet Take 1 tablet by mouth daily Yes Historical Provider, MD   buprenorphine-naloxone (SUBOXONE) 8-2 MG FILM SL film  Yes Historical Provider, MD     Controlled Substance Monitoring:  Acute and Chronic Pain Monitoring:   RX Monitoring 3/24/2022   Attestation -   Periodic Controlled Substance Monitoring No signs of potential drug abuse or diversion identified. ASSESSMENT/PLAN:  1. Essential hypertension  Initial diagnosis  Initial therapy- lisinopril  DISCUSSED AND ADVISED TO:  Cut down on your salt intake. Cut down on caffeinated drinks, sports drinks. Instructed to check BP at home regularly. Report for any chest pains, shortness of breath, headaches, and lightheadedness. Call the office if your blood pressure continue to be higher than 140/90 or 90/50.      - lisinopril (PRINIVIL;ZESTRIL) 5 MG tablet; Take 0.5 tablets by mouth daily  Dispense: 15 tablet; Refill: 0    2. Chronic midline low back pain with right-sided sciatica  Worsening  Start ketorolac  Tizanidine  Naproxen  DISCUSSED AND ADVISED TO:  Use heat packs 15 to 20 mins every 2-3 hours. Do some back stretches as tolerated. Refer to hand out for instructions. Call for worsening, numbness, weakness.      - XR LUMBAR SPINE (2-3 VIEWS); Future  - XR CERVICAL SPINE (4-5 VIEWS); Future  - ketorolac (TORADOL) injection 30 mg  - predniSONE (DELTASONE) 10 MG tablet; Take 4 tabs X 3 days, then 3 tabs X 3 days, then 2 tabs X 3 days, then 1 tab X 3 days  Dispense: 30 tablet; Refill: 0  - tiZANidine (ZANAFLEX) 2 MG tablet; 1-2 tablets at night  Dispense: 60 tablet; Refill: 2  - naproxen (NAPROSYN) 500 MG tablet;  Take 1 tablet by mouth 2 times daily (with meals)  Dispense: 180 tablet; Refill: 1    3. Chronic neck pain  Worsening  Start ketorolac  Tizanidine  Naproxen  DISCUSSED and ADVISED TO:  Stay at a healthy weight. Continue exercises/PT  Stretch to help prevent stiffness and to prevent injury before exercise. Gentle forms of yoga help keep joints and muscles flexible. Walk instead of jog, ride a bike, swim, and water exercise. Lift weights as tolerated. strong muscles help reduce stress on joints. Take pain medicines exactly as directed and only as needed. - XR LUMBAR SPINE (2-3 VIEWS); Future  - XR CERVICAL SPINE (4-5 VIEWS); Future  - ketorolac (TORADOL) injection 30 mg  - predniSONE (DELTASONE) 10 MG tablet; Take 4 tabs X 3 days, then 3 tabs X 3 days, then 2 tabs X 3 days, then 1 tab X 3 days  Dispense: 30 tablet; Refill: 0  - tiZANidine (ZANAFLEX) 2 MG tablet; 1-2 tablets at night  Dispense: 60 tablet; Refill: 2  - naproxen (NAPROSYN) 500 MG tablet; Take 1 tablet by mouth 2 times daily (with meals)  Dispense: 180 tablet; Refill: 1    4. Chronic pain of right knee  Worsening  Start ketorolac  Tizanidine  Naproxen  DISCUSSED and ADVISED TO:  Stay at a healthy weight. Continue exercises/PT  Stretch to help prevent stiffness and to prevent injury before exercise. Gentle forms of yoga help keep joints and muscles flexible. Walk instead of jog, ride a bike, swim, and water exercise. Lift weights as tolerated. strong muscles help reduce stress on joints. Take pain medicines exactly as directed and only as needed. - XR LUMBAR SPINE (2-3 VIEWS); Future  - XR CERVICAL SPINE (4-5 VIEWS); Future  - ketorolac (TORADOL) injection 30 mg  - predniSONE (DELTASONE) 10 MG tablet; Take 4 tabs X 3 days, then 3 tabs X 3 days, then 2 tabs X 3 days, then 1 tab X 3 days  Dispense: 30 tablet; Refill: 0  - tiZANidine (ZANAFLEX) 2 MG tablet; 1-2 tablets at night  Dispense: 60 tablet; Refill: 2  - naproxen (NAPROSYN) 500 MG tablet;  Take 1 tablet by mouth 2 times daily (with meals)  Dispense: 180 tablet; Refill: 1    5. Opioid dependence on agonist therapy ([facility] [address] ( suboxone)  Stable  Continue current therapy  Continue to monitor       On this date 5/24/2022 I have spent 35 minutes reviewing previous notes, test results and face to face with the patient discussing the diagnosis and importance of compliance with the treatment plan as well as documenting on the day of the visit. Return for Keep Prev Appt, Chronic conditions, 30mins. This note was completed by using the assistance of a speech-recognition program. However, inadvertent computerized transcription errors may be present. Although every effort was made to ensure accuracy, no guarantees can be provided that every mistake has been identified and corrected by editing.   Electronically signed by Delroy Dubin, APRN - CNP on 5/23/22 at 4:34 PM EDT     --Delroy Dubin, APRN - CNP

## 2022-05-24 ENCOUNTER — OFFICE VISIT (OUTPATIENT)
Dept: FAMILY MEDICINE CLINIC | Age: 42
End: 2022-05-24
Payer: COMMERCIAL

## 2022-05-24 VITALS
WEIGHT: 156 LBS | HEART RATE: 72 BPM | HEIGHT: 60 IN | BODY MASS INDEX: 30.63 KG/M2 | OXYGEN SATURATION: 99 % | TEMPERATURE: 98.1 F | SYSTOLIC BLOOD PRESSURE: 149 MMHG | DIASTOLIC BLOOD PRESSURE: 103 MMHG

## 2022-05-24 DIAGNOSIS — F11.20 OPIOID DEPENDENCE ON AGONIST THERAPY (HCC): ICD-10-CM

## 2022-05-24 DIAGNOSIS — G89.29 CHRONIC MIDLINE LOW BACK PAIN WITH RIGHT-SIDED SCIATICA: ICD-10-CM

## 2022-05-24 DIAGNOSIS — I10 ESSENTIAL HYPERTENSION: Primary | ICD-10-CM

## 2022-05-24 DIAGNOSIS — G89.29 CHRONIC PAIN OF RIGHT KNEE: ICD-10-CM

## 2022-05-24 DIAGNOSIS — G89.29 CHRONIC NECK PAIN: ICD-10-CM

## 2022-05-24 DIAGNOSIS — M54.2 CHRONIC NECK PAIN: ICD-10-CM

## 2022-05-24 DIAGNOSIS — M25.561 CHRONIC PAIN OF RIGHT KNEE: ICD-10-CM

## 2022-05-24 DIAGNOSIS — M54.41 CHRONIC MIDLINE LOW BACK PAIN WITH RIGHT-SIDED SCIATICA: ICD-10-CM

## 2022-05-24 PROCEDURE — G8417 CALC BMI ABV UP PARAM F/U: HCPCS | Performed by: FAMILY MEDICINE

## 2022-05-24 PROCEDURE — G8427 DOCREV CUR MEDS BY ELIG CLIN: HCPCS | Performed by: FAMILY MEDICINE

## 2022-05-24 PROCEDURE — 1036F TOBACCO NON-USER: CPT | Performed by: FAMILY MEDICINE

## 2022-05-24 PROCEDURE — 99214 OFFICE O/P EST MOD 30 MIN: CPT | Performed by: FAMILY MEDICINE

## 2022-05-24 PROCEDURE — 96372 THER/PROPH/DIAG INJ SC/IM: CPT | Performed by: FAMILY MEDICINE

## 2022-05-24 RX ORDER — ENOXAPARIN SODIUM 100 MG/ML
INJECTION SUBCUTANEOUS
COMMUNITY
Start: 2022-05-23 | End: 2022-05-24 | Stop reason: ALTCHOICE

## 2022-05-24 RX ORDER — KETOROLAC TROMETHAMINE 30 MG/ML
30 INJECTION, SOLUTION INTRAMUSCULAR; INTRAVENOUS ONCE
Status: COMPLETED | OUTPATIENT
Start: 2022-05-24 | End: 2022-05-24

## 2022-05-24 RX ORDER — LISINOPRIL 5 MG/1
2.5 TABLET ORAL DAILY
Qty: 15 TABLET | Refills: 0 | Status: SHIPPED | OUTPATIENT
Start: 2022-05-24 | End: 2022-07-25 | Stop reason: SDUPTHER

## 2022-05-24 RX ORDER — NAPROXEN 500 MG/1
500 TABLET ORAL 2 TIMES DAILY WITH MEALS
Qty: 180 TABLET | Refills: 1 | Status: SHIPPED | OUTPATIENT
Start: 2022-05-24 | End: 2022-10-13 | Stop reason: SDUPTHER

## 2022-05-24 RX ORDER — TIZANIDINE 2 MG/1
TABLET ORAL
Qty: 60 TABLET | Refills: 2 | Status: SHIPPED | OUTPATIENT
Start: 2022-05-24 | End: 2022-08-15

## 2022-05-24 RX ORDER — PREDNISONE 10 MG/1
TABLET ORAL
Qty: 30 TABLET | Refills: 0 | Status: SHIPPED | OUTPATIENT
Start: 2022-05-24 | End: 2022-07-25

## 2022-05-24 RX ADMIN — KETOROLAC TROMETHAMINE 30 MG: 30 INJECTION, SOLUTION INTRAMUSCULAR; INTRAVENOUS at 11:35

## 2022-05-24 ASSESSMENT — ENCOUNTER SYMPTOMS
BACK PAIN: 1
ABDOMINAL PAIN: 0
WHEEZING: 0
RESPIRATORY NEGATIVE: 1
SHORTNESS OF BREATH: 0

## 2022-05-24 ASSESSMENT — PATIENT HEALTH QUESTIONNAIRE - PHQ9
8. MOVING OR SPEAKING SO SLOWLY THAT OTHER PEOPLE COULD HAVE NOTICED. OR THE OPPOSITE, BEING SO FIGETY OR RESTLESS THAT YOU HAVE BEEN MOVING AROUND A LOT MORE THAN USUAL: 0
SUM OF ALL RESPONSES TO PHQ QUESTIONS 1-9: 0
2. FEELING DOWN, DEPRESSED OR HOPELESS: 0
SUM OF ALL RESPONSES TO PHQ QUESTIONS 1-9: 0
SUM OF ALL RESPONSES TO PHQ9 QUESTIONS 1 & 2: 0
SUM OF ALL RESPONSES TO PHQ QUESTIONS 1-9: 0
6. FEELING BAD ABOUT YOURSELF - OR THAT YOU ARE A FAILURE OR HAVE LET YOURSELF OR YOUR FAMILY DOWN: 0
9. THOUGHTS THAT YOU WOULD BE BETTER OFF DEAD, OR OF HURTING YOURSELF: 0
SUM OF ALL RESPONSES TO PHQ QUESTIONS 1-9: 0
5. POOR APPETITE OR OVEREATING: 0
1. LITTLE INTEREST OR PLEASURE IN DOING THINGS: 0
3. TROUBLE FALLING OR STAYING ASLEEP: 0
10. IF YOU CHECKED OFF ANY PROBLEMS, HOW DIFFICULT HAVE THESE PROBLEMS MADE IT FOR YOU TO DO YOUR WORK, TAKE CARE OF THINGS AT HOME, OR GET ALONG WITH OTHER PEOPLE: 0
7. TROUBLE CONCENTRATING ON THINGS, SUCH AS READING THE NEWSPAPER OR WATCHING TELEVISION: 0
4. FEELING TIRED OR HAVING LITTLE ENERGY: 0

## 2022-05-24 NOTE — PATIENT INSTRUCTIONS
New Updates for [facility] MyChart/ [employer] ([facility]) [facility]    Thank you for choosing [facility] to give you the best care! [employer] ([facility]) is always trying to think of new ways to help their patients. We are asking all patients to try out the new digital registration that is now available through your [facility] account or the new [place], [employer] ([facility]). Via the mason you're now able to update your personal and registration information prior to your upcoming appointment. This will save you time once you arrive at the office to check-in, not to mention your information remains safe!! Many other perks come from signing up for an account, such as:   Requesting refills   Scheduling an appointment   Completing an [address] Sending a message to the office/provider   Having access to your medication list   Paying your bill/copay prior to your appointment   Scheduling your yearly mammogram   Review your test results    If you are not familiar with [facility] or the [employer] ([facility]) MASON, please ask one of us and we will be happy to answer any questions or help you set-up your account. [facility],  [facility]  Patient Education        Low Back Pain: Exercises  Introduction  Here are some examples of exercises for you to try. The exercises may be suggested for a condition or for rehabilitation. Start each exercise slowly. Ease off the exercises if you start to have pain. You will be told when to start these exercises and which ones will work bestfor you. How to do the exercises  Press-up    1. Lie on your stomach, supporting your body with your forearms. 2. Press your elbows down into the floor to raise your upper back. As you do this, relax your stomach muscles and allow your back to arch without using your back muscles. As your press up, do not let your hips or pelvis come off the floor. 3. Hold for 15 to 30 seconds, then relax. 4. Repeat 2 to 4 times.   Alternate arm and leg (bird dog) exercise    Do this exercise slowly. Try to keep your body straight at all times, and donot let one hip drop lower than the other. 1. Start on the floor, on your hands and knees. 2. Tighten your belly muscles. 3. Raise one leg off the floor, and hold it straight out behind you. Be careful not to let your hip drop down, because that will twist your trunk. 4. Hold for about 6 seconds, then lower your leg and switch to the other leg. 5. Repeat 8 to 12 times on each leg. 6. Over time, work up to holding for 10 to 30 seconds each time. 7. If you feel stable and secure with your leg raised, try raising the opposite arm straight out in front of you at the same time. Knee-to-chest exercise    1. Lie on your back with your knees bent and your feet flat on the floor. 2. Bring one knee to your chest, keeping the other foot flat on the floor (or keeping the other leg straight, whichever feels better on your lower back). 3. Keep your lower back pressed to the floor. Hold for at least 15 to 30 seconds. 4. Relax, and lower the knee to the starting position. 5. Repeat with the other leg. Repeat 2 to 4 times with each leg. 6. To get more stretch, put your other leg flat on the floor while pulling your knee to your chest.  Curl-ups    1. Lie on the floor on your back with your knees bent at a 90-degree angle. Your feet should be flat on the floor, about 12 inches from your buttocks. 2. Cross your arms over your chest. If this bothers your neck, try putting your hands behind your neck (not your head), with your elbows spread apart. 3. Slowly tighten your belly muscles and raise your shoulder blades off the floor. 4. Keep your head in line with your body, and do not press your chin to your chest.  5. Hold this position for 1 or 2 seconds, then slowly lower yourself back down to the floor. 6. Repeat 8 to 12 times. Pelvic tilt exercise    1. Lie on your back with your knees bent. 2. \"Brace\" your stomach.  This means to tighten your muscles by pulling in and imagining your belly button moving toward your spine. You should feel like your back is pressing to the floor and your hips and pelvis are rocking back. 3. Hold for about 6 seconds while you breathe smoothly. 4. Repeat 8 to 12 times. Heel dig bridging    1. Lie on your back with both knees bent and your ankles bent so that only your heels are digging into the floor. Your knees should be bent about 90 degrees. 2. Then push your heels into the floor, squeeze your buttocks, and lift your hips off the floor until your shoulders, hips, and knees are all in a straight line. 3. Hold for about 6 seconds as you continue to breathe normally, and then slowly lower your hips back down to the floor and rest for up to 10 seconds. 4. Do 8 to 12 repetitions. Hamstring stretch in doorway    1. Lie on your back in a doorway, with one leg through the open door. 2. Slide your leg up the wall to straighten your knee. You should feel a gentle stretch down the back of your leg. 3. Hold the stretch for at least 15 to 30 seconds. Do not arch your back, point your toes, or bend either knee. Keep one heel touching the floor and the other heel touching the wall. 4. Repeat with your other leg. 5. Do 2 to 4 times for each leg. Hip flexor stretch    1. Kneel on the floor with one knee bent and one leg behind you. Place your forward knee over your foot. Keep your other knee touching the floor. 2. Slowly push your hips forward until you feel a stretch in the upper thigh of your rear leg. 3. Hold the stretch for at least 15 to 30 seconds. Repeat with your other leg. 4. Do 2 to 4 times on each side. Wall sit    1. Stand with your back 10 to 12 inches away from a wall. 2. Lean into the wall until your back is flat against it. 3. Slowly slide down until your knees are slightly bent, pressing your lower back into the wall. 4. Hold for about 6 seconds, then slide back up the wall.   5. Repeat 8 to 12 times. Follow-up care is a key part of your treatment and safety. Be sure to make and go to all appointments, and call your doctor if you are having problems. It's also a good idea to know your test results and keep alist of the medicines you take. Where can you learn more? Go to https://chpepiceweb.Bugcrowd. org and sign in to your [employer] account. Enter V094 in the Gridsum box to learn more about \"Low Back Pain: Exercises. \"     If you do not have an account, please click on the \"Sign Up Now\" link. Current as of: July 1, 2021               Content Version: 13.2  © 2006-2022 Healthwise, [employer]. Care instructions adapted under license by [facility] ([facility]). If you have questions about a medical condition or this instruction, always ask your healthcare professional. [place] [address] any warranty or liability for your use of this information. Patient Education        Neck: Exercises  Introduction  Here are some examples of exercises for you to try. The exercises may be suggested for a condition or for rehabilitation. Start each exercise slowly. Ease off the exercises if you start to have pain. You will be told when to start these exercises and which ones will work bestfor you. How to do the exercises  Neck stretch    1. This stretch works best if you keep your shoulder down as you lean away from it. To help you remember to do this, start by relaxing your shoulders and lightly holding on to your thighs or your chair. 2. Tilt your head toward your shoulder and hold for 15 to 30 seconds. Let the weight of your head stretch your muscles. 3. If you would like a little added stretch, use your hand to gently and steadily pull your head toward your shoulder. For example, keeping your right shoulder down, lean your head to the left. 4. Repeat 2 to 4 times toward each shoulder. Diagonal neck stretch    1.  Turn your head slightly toward the direction you will be stretching, and tilt your head diagonally toward your chest and hold for 15 to 30 seconds. 2. If you would like a little added stretch, use your hand to gently and steadily pull your head forward on the diagonal.  3. Repeat 2 to 4 times toward each side. Dorsal glide stretch    The dorsal glide stretches the back of the neck. If you feel pain, do not glide so far back. Some people find this exercise easier to do while lying on theirbacks with an ice pack on the neck. 1. Sit or stand tall and look straight ahead. 2. Slowly tuck your chin as you glide your head backward over your body  3. Hold for a count of 6, and then relax for up to 10 seconds. 4. Repeat 8 to 12 times. Chest and shoulder stretch    1. Sit or stand tall and glide your head backward as in the dorsal glide stretch. 2. Raise both arms so that your hands are next to your ears. 3. Take a deep breath, and as you breathe out, lower your elbows down and behind your back. You will feel your shoulder blades slide down and together, and at the same time you will feel a stretch across your chest and the front of your shoulders. 4. Hold for about 6 seconds, and then relax for up to 10 seconds. 5. Repeat 8 to 12 times. Strengthening: Hands on head    1. Move your head backward, forward, and side to side against gentle pressure from your hands, holding each position for about 6 seconds. 2. Repeat 8 to 12 times. Follow-up care is a key part of your treatment and safety. Be sure to make and go to all appointments, and call your doctor if you are having problems. It's also a good idea to know your test results and keep alist of the medicines you take. Where can you learn more? Go to https://Fitocracy.Good Photo. org and sign in to your [employer] account. Enter P975 in the PowerFile box to learn more about \"Neck: Exercises. \"     If you do not have an account, please click on the \"Sign Up Now\" link.   Current as of: July 1, 2021               Content Version: 13.2  © 1756-8096 Healthwise, Incorporated. Care instructions adapted under license by [facility] ([facility]). If you have questions about a medical condition or this instruction, always ask your healthcare professional. [address] any warranty or liability for your use of this information.

## 2022-05-24 NOTE — Clinical Note
Please let her know that I did send a blood pressure medications for her to take. She can take this daily. Please have her come in next week for a nurse visit to get her blood pressure rechecked.   Thank you

## 2022-06-01 ENCOUNTER — NURSE ONLY (OUTPATIENT)
Dept: FAMILY MEDICINE CLINIC | Age: 42
End: 2022-06-01

## 2022-06-01 VITALS — OXYGEN SATURATION: 98 % | HEART RATE: 86 BPM | SYSTOLIC BLOOD PRESSURE: 120 MMHG | DIASTOLIC BLOOD PRESSURE: 86 MMHG

## 2022-06-01 DIAGNOSIS — I10 ESSENTIAL HYPERTENSION: Primary | ICD-10-CM

## 2022-06-01 NOTE — PROGRESS NOTES
Chief Complaint   Patient presents with    Hypertension     blood pressure check       Alejandra Reyes is here for BP check    BP Readings from Last 3 Encounters:   06/01/22 120/86   05/24/22 (!) 149/103   03/24/22 126/80       BP is 120/86      Future Appointments   Date Time Provider Shaji Ortega   7/25/2022 10:30 AM Susie Mathews, APRN - CNP fp [id number]

## 2022-07-18 ENCOUNTER — HOSPITAL ENCOUNTER (OUTPATIENT)
Dept: GENERAL RADIOLOGY | Age: 42
Discharge: HOME | End: 2022-07-20
Payer: COMMERCIAL

## 2022-07-18 ENCOUNTER — HOSPITAL ENCOUNTER (OUTPATIENT)
Age: 42
Discharge: HOME | End: 2022-07-18
Payer: COMMERCIAL

## 2022-07-18 DIAGNOSIS — G89.29 CHRONIC MIDLINE LOW BACK PAIN WITH RIGHT-SIDED SCIATICA: ICD-10-CM

## 2022-07-18 DIAGNOSIS — G89.29 CHRONIC PAIN OF RIGHT KNEE: ICD-10-CM

## 2022-07-18 DIAGNOSIS — M25.561 CHRONIC PAIN OF RIGHT KNEE: ICD-10-CM

## 2022-07-18 DIAGNOSIS — M54.41 CHRONIC MIDLINE LOW BACK PAIN WITH RIGHT-SIDED SCIATICA: ICD-10-CM

## 2022-07-18 DIAGNOSIS — G89.29 CHRONIC NECK PAIN: ICD-10-CM

## 2022-07-18 DIAGNOSIS — J30.9 CHRONIC ALLERGIC RHINITIS: ICD-10-CM

## 2022-07-18 DIAGNOSIS — M54.2 CHRONIC NECK PAIN: ICD-10-CM

## 2022-07-18 PROCEDURE — 72050 X-RAY EXAM NECK SPINE 4/5VWS: CPT

## 2022-07-18 PROCEDURE — 72100 X-RAY EXAM L-S SPINE 2/3 VWS: CPT

## 2022-07-18 RX ORDER — FLUTICASONE PROPIONATE 50 MCG
SPRAY, SUSPENSION (ML) NASAL
Qty: 16 G | Refills: 2 | Status: SHIPPED | OUTPATIENT
Start: 2022-07-18

## 2022-07-18 NOTE — TELEPHONE ENCOUNTER
Please Approve or Refuse.   Send to Pharmacy per Pt's Request: [facility]enzie     Next Visit Date:  7/25/2022   Last Visit Date: 5/24/2022    Hemoglobin A1C (%)   Date Value   03/21/2022 5.1             ( goal A1C is < 7)   BP Readings from Last 3 Encounters:   06/01/22 120/86   05/24/22 (!) 149/103   03/24/22 126/80          (goal 120/80)  BUN   Date Value Ref Range Status   03/21/2022 6 6 - 20 mg/dL Final     CREATININE   Date Value Ref Range Status   03/21/2022 0.50 0.50 - 0.90 mg/dL Final     Potassium   Date Value Ref Range Status   03/21/2022 4.1 3.7 - 5.3 mmol/L Final

## 2022-07-24 RX ORDER — CLOTRIMAZOLE AND BETAMETHASONE DIPROPIONATE 10; .64 MG/G; MG/G
CREAM TOPICAL
COMMUNITY
Start: 2022-06-28 | End: 2022-10-13 | Stop reason: ALTCHOICE

## 2022-07-24 ASSESSMENT — ENCOUNTER SYMPTOMS
BACK PAIN: 1
SHORTNESS OF BREATH: 0
RESPIRATORY NEGATIVE: 1
WHEEZING: 0
ABDOMINAL PAIN: 0

## 2022-07-24 NOTE — PROGRESS NOTES
[facility] PHYSICIANS  [facility] FAMILY PHYSICIANS [address] [facility] [address].  [address] [id number]  Dept: Cb Sosa (:  1980) is a 39 y.o. female. Patient is here for evaluation of the following chief complaint(s):  Chief Complaint   Patient presents with    Hypertension    Other     Follow uop on left ear was seen in urgent care, she is also following up on her back         SUBJECTIVE/OBJECTIVE:  SUSANA Gallo is a 39 y.o. female patient. Patient is an established patient of mine. Patient has a known history of hypertension, low back pain, chronic neck pain, knee pain, on suboxone and presented with hypertension. Old injury, may have exacerbated at home. LEFT EAR  Alejandra presents with a sore on the back of her left ear. She first noticed it about 1 month ago and was seen in an urgent care. She described the initial site as \"like a pimple or a boil. \" She was given an antibiotic and sent home. She finished the first antibiotic and it did not help. She was then seen again and given doxycycline and a topical cream. It did not help. She presents today with a flat area with dried serous looking drainage. It has well defined margins that are reddened. She states that is is very painful, especially when the strap from her mask touches the sore. We will culture it and sent it. We will start cefdinir and Bactroban topical in the meantime. HYPERTENSION  Alejandra Medina has a well controlled hypertension. she is currently on lisinopril- increase dose to 5mg. Patient's most recent BP in the office was high. she reports stable BP readings at home. Patient denies any adverse reaction to this therapy. she denies any CP, SOB, HA, or palpitations. Patient admits to exercising and adheres to low salt diet. No history of organ damage due to condition noted.   Lab Results   Component Value Date/Time    CREATININE 0.50 2022 09:14 AM     CERVICAL SPINE  Patient complains of some chronic neck pain. Patient states that she has had this for a while. She has been having this for few months and have noticed some worsening pain. Patient reported a previous relationship with a pain management doctor and have had a few injections but does not remember who it was. She denies any bowel or bladder issues. No pain radiations no weakness or falls. She wants to get established with a new pain management specialist. She wants to see them before trying physical therapy again, which she has already done. Will start gabapentin as well in the meantime. She also takes naproxen and tizanidine as needed. She states she gets minimal relief with these. Impression   Unremarkable examination of the lumbar spine. LUMBAR SPINE  Patient reports mid low back pain she describes as achy. She does report some pain radiation to the right buttocks to the right knee. She also reports some chronic right knee pain. She has had a previous x-rays in the past this is not visible to us today during the visit. Despite having been established with a pain management doctor patient would like to see another group of pain management doctors within [facility] ([facility]). Patient has been using some heat packs. She is currently on Suboxone due to a previous opiate use. She denies any relief with home remedies. Impression   No acute abnormality of the cervical spine. No significant degenerative changes   Patient's blood count is WNL. Lab Results   Component Value Date    WBC 4.7 03/21/2022    HGB 12.4 03/21/2022    HCT 38.1 03/21/2022    MCV 92.7 03/21/2022     03/21/2022    LYMPHOPCT 30 03/21/2022    RBC 4.12 03/21/2022    MCH 30.2 03/21/2022    MCHC 32.6 03/21/2022    RDW 14.2 03/21/2022       THYROID FUNCTION  Alejandra Adams  's most recent TSH level was normal. Results reviewed with the patient. Patient denies any history of thyroid disorders.   Lab Results   Component Value Date/Time    TSH 1.73 Allergic/Immunologic: Positive for environmental allergies. Neurological:  Negative for headaches. Psychiatric/Behavioral:  Negative for suicidal ideas. The patient is nervous/anxious. Physical Exam  Vitals and nursing note reviewed. Constitutional:       Appearance: Normal appearance. HENT:      Head: Normocephalic. Left Ear: Ear canal normal. Laceration and tenderness present. Ears:      Comments: Back of ear-redness and dried serous drainage     Nose: Nose normal.   Cardiovascular:      Rate and Rhythm: Normal rate and regular rhythm. Pulmonary:      Effort: Pulmonary effort is normal.      Breath sounds: Normal breath sounds. Musculoskeletal:      Cervical back: No spasms. Decreased range of motion. Lumbar back: Spasms present. Decreased range of motion. Positive right straight leg raise test. Negative left straight leg raise test.   Skin:     General: Skin is warm and dry. Comments: Cellulitis of external ear   Neurological:      Mental Status: She is alert and oriented to person, place, and time. Psychiatric:         Mood and Affect: Mood is anxious. Speech: Speech is rapid and pressured. Thought Content: Thought content does not include suicidal ideation. MEDICAL HISTORY      Diagnosis Date    Anxiety 2/13/2015    Chronic midline low back pain with right-sided sciatica 5/24/2022    Degeneration of cervical intervertebral disc 12/21/2020    Depression 12/5/2012    Essential hypertension 12/21/2020    Gastro-esophageal reflux disease without esophagitis 12/21/2020    Lumbar radiculopathy, acute 2/27/2013    Opioid use, unspecified with other opioid-induced disorder (HCC)(Suboxone) 12/21/2020      MEDICATIONS  Prior to Visit Medications    Medication Sig Taking? Authorizing Provider   lisinopril (PRINIVIL;ZESTRIL) 5 MG tablet Take 1 tablet by mouth in the morning.  Yes Susie Mathews, APRN - CNP   cefdinir (OMNICEF) 300 MG capsule Take 1 capsule by mouth in the morning and 1 capsule before bedtime. Do all this for 10 days. Yes TABITHA Hopson CNP   mupirocin (BACTROBAN) 2 % cream Apply 3 times daily. Yes TABITHA Hopson CNP   gabapentin (NEURONTIN) 100 MG capsule Take 1 capsule by mouth in the morning and 1 capsule at noon and 1 capsule before bedtime. Do all this for 30 days. Intended supply: 90 days. Yes TABITHA Hopson CNP   clotrimazole-betamethasone (LOTRISONE) 1-0.05 % cream APPLY TOPICALLY TO AFFECTED AREAS every morning and BEFORE BEDTIME Yes Historical Provider, MD   fluticasone (FLONASE) 50 MCG/ACT nasal spray INSTILL 1 SPRAY BY EACH NOSTRIL ROUTE DAILY Yes TABITHA Hopson CNP   tiZANidine (ZANAFLEX) 2 MG tablet 1-2 tablets at night Yes TABITHA Hopson CNP   naproxen (NAPROSYN) 500 MG tablet Take 1 tablet by mouth 2 times daily (with meals) Yes TABITHA Hopson CNP   naloxone 4 MG/0.1ML LIQD nasal spray 1 spray by Nasal route as needed Yes Historical Provider, MD   Prenatal Vit-Fe Fumarate-FA (PRENATAL VITAMINS) 28-0.8 MG TABS Take 1 tablet by mouth daily Yes TABITHA Hopson CNP   Nutritional Supplements (NUTRITIONAL SHAKE HIGH PROTEIN) LIQD Take 1 Units by mouth daily Yes TABITHA Hopson CNP   aspirin 81 MG EC tablet Take 1 tablet by mouth daily Yes Historical Provider, MD   buprenorphine-naloxone (SUBOXONE) 8-2 MG FILM SL film  Yes Historical Provider, MD   vitamin D (CHOLECALCIFEROL) 25 MCG (1000 UT) TABS tablet Take 1 tablet by mouth daily  TABITHA Hopson CNP     Controlled Substance Monitoring:  Acute and Chronic Pain Monitoring:   RX Monitoring 3/24/2022   Attestation -   Periodic Controlled Substance Monitoring No signs of potential drug abuse or diversion identified. ASSESSMENT/PLAN:  1. Cellulitis of left external ear  Ongoing. Start antibiotic and topical cream.  Advised to keep area clean. - cefdinir (OMNICEF) 300 MG capsule;  Take 1 30mins. This note was completed by using the assistance of a speech-recognition program. However, inadvertent computerized transcription errors may be present. Although every effort was made to ensure accuracy, no guarantees can be provided that every mistake has been identified and corrected by editing.   Electronically signed by TABITHA Vega CNP on 5/23/22 at 4:34 PM EDT     --TABITHA Vega CNP

## 2022-07-25 ENCOUNTER — OFFICE VISIT (OUTPATIENT)
Dept: FAMILY MEDICINE CLINIC | Age: 42
End: 2022-07-25
Payer: COMMERCIAL

## 2022-07-25 VITALS
OXYGEN SATURATION: 98 % | HEIGHT: 60 IN | DIASTOLIC BLOOD PRESSURE: 86 MMHG | BODY MASS INDEX: 30.82 KG/M2 | WEIGHT: 157 LBS | HEART RATE: 75 BPM | SYSTOLIC BLOOD PRESSURE: 138 MMHG | TEMPERATURE: 97.6 F

## 2022-07-25 DIAGNOSIS — G89.29 CHRONIC NECK PAIN: ICD-10-CM

## 2022-07-25 DIAGNOSIS — M54.41 CHRONIC MIDLINE LOW BACK PAIN WITH RIGHT-SIDED SCIATICA: ICD-10-CM

## 2022-07-25 DIAGNOSIS — I10 ESSENTIAL HYPERTENSION: ICD-10-CM

## 2022-07-25 DIAGNOSIS — M54.2 CHRONIC NECK PAIN: ICD-10-CM

## 2022-07-25 DIAGNOSIS — H60.12 CELLULITIS OF LEFT EXTERNAL EAR: Primary | ICD-10-CM

## 2022-07-25 DIAGNOSIS — G89.29 CHRONIC MIDLINE LOW BACK PAIN WITH RIGHT-SIDED SCIATICA: ICD-10-CM

## 2022-07-25 DIAGNOSIS — F11.20 OPIOID DEPENDENCE ON AGONIST THERAPY (HCC): ICD-10-CM

## 2022-07-25 PROCEDURE — 99214 OFFICE O/P EST MOD 30 MIN: CPT | Performed by: FAMILY MEDICINE

## 2022-07-25 RX ORDER — LISINOPRIL 5 MG/1
5 TABLET ORAL DAILY
Qty: 90 TABLET | Refills: 2 | Status: SHIPPED | OUTPATIENT
Start: 2022-07-25 | End: 2022-10-13 | Stop reason: SDUPTHER

## 2022-07-25 RX ORDER — MUPIROCIN CALCIUM 20 MG/G
CREAM TOPICAL
Qty: 1 EACH | Refills: 0 | Status: SHIPPED | OUTPATIENT
Start: 2022-07-25 | End: 2022-08-24

## 2022-07-25 RX ORDER — GABAPENTIN 100 MG/1
100 CAPSULE ORAL 3 TIMES DAILY
Qty: 90 CAPSULE | Refills: 0 | Status: SHIPPED | OUTPATIENT
Start: 2022-07-25 | End: 2022-08-29

## 2022-07-25 RX ORDER — CEFDINIR 300 MG/1
300 CAPSULE ORAL 2 TIMES DAILY
Qty: 20 CAPSULE | Refills: 0 | Status: SHIPPED | OUTPATIENT
Start: 2022-07-25 | End: 2022-08-04

## 2022-07-25 NOTE — LETTER
CONTROLLED SUBSTANCE MEDICATION AGREEMENT     Patient Name: Sharmaine Powers  Patient YOB: 1980   I understand, that controlled substance medications may be used to help better manage my symptoms and to improve my ability to function at home, work and in social settings. However, I also understand that these medications do have risks, which have been discussed with me, including possible development of physical or psychological dependence. I understand that successful treatment requires mutual trust and honesty between me and my provider. I understand and agree that following this Medication Agreement is necessary in continuing my provider-patient relationship and the success of my treatment plan. Explanation from my Provider: Benefits and Goals of Controlled Substance Medications: There are two potential goals for your treatment: (1) decreased pain and suffering (2) improved daily life functions. There are many possible treatments for your chronic condition(s). Alternatives such as physical therapy, yoga, massage, home daily exercise, meditation, relaxation techniques, injections, chiropractic manipulations, surgery, cognitive therapy, hypnosis and many medications that are not habit-forming may be used. Use of controlled substance medications may be helpful, but they are unlikely to resolve all symptoms or restore all function. Explanation from my Provider: Risks of Controlled Substance Medications:  Opioid pain medications: These medications can lead to problems such as addiction/dependence, sedation, lightheadedness/dizziness, memory issues, falls, constipation, nausea, or vomiting. They may also impair the ability to drive or operate machinery. Additionally, these medications may lower testosterone levels, leading to loss of bone strength, stamina and sex drive.   They may cause problems with breathing, sleep apnea and reduced coughing, which is especially dangerous for patients with lung disease. Overdose or dangerous interactions with alcohol and other medications may occur, leading to death. Hyperalgesia may develop, which means patients receiving opioids for the treatment of pain may become more sensitive to certain painful stimuli, and in some cases, experience pain from ordinarily non-painful stimuli. Women between the ages of 14-53 who could become pregnant should carefully weigh the risks and benefits of opioids with their physicians, as these medications increase the risk of pregnancy complications, including miscarriage,  delivery and stillbirth. It is also possible for babies to be born addicted to opioids. Opioid dependence withdrawal symptoms may include; feelings of uneasiness, increased pain, irritability, belly pain, diarrhea, sweats and goose-flesh. Benzodiazepines and non-benzodiazepine sleep medications: These medications can lead to problems such as addiction/dependence, sedation, fatigue, lightheadedness, dizziness, incoordination, falls, depression, hallucinations, and impaired judgment, memory and concentration. The ability to drive and operate machinery may also be affected. Abnormal sleep-related behaviors have been reported, including sleepwalking, driving, making telephone calls, eating, or having sex while not fully awake. These medications can suppress breathing and worsen sleep apnea, particularly when combined with alcohol or other sedating medications, potentially leading to death. Dependence withdrawal symptoms may include tremors, anxiety, hallucinations and seizures. Stimulants:  Common adverse effects include addiction/dependence, increased blood  pressure and heart rate, decreased appetite, nausea, involuntary weight loss, insomnia,                                                                                                                     Initials:_______   irritability, and headaches.   These risks may increase when these medications are combined with other stimulants, such as caffeine pills or energy drinks, certain weight loss supplements and oral decongestants. Dependence withdrawal symptoms may include depressed mood, loss of interest, suicidal thoughts, anxiety, fatigue, appetite changes and agitation. Testosterone replacement therapy:  Potential side effects include increased risk of stroke and heart attack, blood clots, increased blood pressure, increased cholesterol, enlarged prostate, sleep apnea, irritability/aggression and other mood disorders, and decreased fertility. I agree and understand that I and my prescriber have the following rights and responsibilities regarding my treatment plan:     1. MY RIGHTS:  To be informed of my treatment and medication plan. To be an active participant in my health and wellbeing. 2. MY RESPONSIBILITY AND UNDERSTANDING FOR USE OF MEDICATIONS   I will take medications at the dose and frequency as directed. For my safety, I will not increase or change how I take my medications without the recommendation of my healthcare provider.  I will actively participate in any program recommended by my provider which may improve function, including social, physical, psychological programs.  I will not take my medications with alcohol or other drugs not prescribed to me. I understand that drinking alcohol with my medications increases the chances of side effects, including reduced breathing rate and could lead to personal injury when operating machinery.  I understand that if I have a history of substance use disorders, including alcohol or other illicit drugs, that I may be at increased risk of addiction to my medications.  I agree to notify my provider immediately if I should become pregnant so that my treatment plan can be adjusted.    I agree and understand that I shall only receive controlled substance medications from the prescriber that signed this agreement unless there is written agreement among other prescribers of controlled substances outlining the responsibility of the medications being prescribed.  I understand that the if the controlled medication is not helping to achieve goals, the dosage may be tapered and no longer prescribed. 3. MY RESPONSIBILITY FOR COMMUNICATION / PRESCRIPTION RENEWALS   I agree that all controlled substance medications that I take will be prescribed only by my provider. If another healthcare provider prescribes me medication in an emergency, I will notify my provider within seventy-two (72) hours.  I will arrange for refills at the prescribed interval ONLY during regular office hours. I will not ask for refills earlier than agreed, after-hours, on holidays or weekends. Refills may take up to 72 hours for processing and prescriptions to reach the pharmacy.  I will inform my other health care providers that I am taking these medications and of the existence of this [id number]. In the event of an emergency, I will provide the same information to the emergency department prescribers.  I will keep my provider updated on the pharmacy I am using for controlled medication prescription filling. Initials:_______  4. MY RESPONSIBILITY FOR PROTECTING MEDICATIONS   I will protect my prescriptions and medications. I understand that lost or misplaced prescriptions will not be replaced.  I will keep medications only for my own use and will not share them with others. I will keep all medications away from children.  I agree that if my medications are adjusted or discontinued, I will properly dispose of any remaining medications. I understand that I will be required to dispose of any remaining controlled medications as, directed by my prescriber, prior to being provided with any prescriptions for other controlled medications.   Medication drop box locations can be found at: [email or web address]    5. MY RESPONSIBILITY WITH ILLEGAL DRUGS    I will not use illegal or street drugs or another person's prescription medications not prescribed to me.  If there are identified addiction type symptoms, then referral to a program may be provided by my provider and I agree to follow through with this recommendation. 6. MY RESPONSIBILITY FOR COOPERATION WITH INVESTIGATIONS   I understand that my provider will comply with any applicable law and may discuss my use and/or possible misuse/abuse of controlled substances and alcohol, as appropriate, with any health care provider involved in my care, pharmacist, or legal authority.  I authorize my provider and pharmacy to cooperate fully with law enforcement agencies (as permitted by law) in the investigation of any possible misuse, sale, or other diversion of my controlled substances.  I agree to waive any applicable privilege or right of privacy or confidentiality with respect to these authorizations. 7. PROVIDERS RIGHT TO MONITOR FOR SAFETY: PRESCRIPTION MONITORING / DRUG TESTING   I consent to drug/toxicology screening and will submit to a drug screen upon my providers request to assure I am only taking the prescribed drugs for my safety monitoring. I understand that a drug screen is a laboratory test in which a sample of my urine, blood or saliva is checked to see what drugs I have been taking. This may entail an observed urine specimen, which means that a nurse or other health care provider may watch me provide urine, and I will cooperate if I am asked to provide an observed specimen.  I understand that my provider will check a copy of my State Prescription Monitoring Program () Report in order to safely prescribe medications.  Pill Counts: I consent to pill counts when requested.   I may be asked to bring all my prescribed controlled substance medications, in their original bottles, to all of my scheduled appointments. In addition, my provider may ask me to come to the practice at any time for a random pill count. 8. TERMINATION OF THIS AGREEMENT  For my safety, my prescriber has the right to stop prescribing controlled substance medications and may end this agreement. Initials:_______   Conditions that may result in termination of this agreement:  a. I do not show any improvement in pain, or my activity has not improved. b. I develop rapid tolerance or loss of improvement, as described in my treatment plan.  c. I develop significant side effects from the medication. d. My behavior is not consistent with the responsibilities outlined above, thereby causing safety concerns to continue prescribing controlled substance medications. e. I fail to follow the terms of this agreement. f. Other:____________________________       UNDERSTANDING THIS MEDICATION AGREEMENT:    I have read the above and have had all my questions answered. For chronic disease management, I know that my symptoms can be managed with many types of treatments. A chronic medication trial may be part of my treatment, but I must be an active participant in my care. Medication therapy is only one part of my symptom management plan. In some cases, there may be limited scientific evidence to support the chronic use of certain medications to improve symptoms and daily function. Furthermore, in certain circumstances, there may be scientific information that suggests that the use of chronic controlled substances may worsen my symptoms and increase my risk of unintentional death directly related to this medication therapy. I know that if my provider feels my risk from controlled medications is greater than my benefit, I will have my controlled substance medication(s) compassionately lowered or removed altogether.      I further agree to allow this office to contact my HIPAA contact if there are concerns about my safety and use of the controlled medications. I have agreed to use the prescribed controlled substance medications to me as instructed by my provider and as stated in this Medication Agreement. My initial on each page and my signature below shows that I have read each page and I have had the opportunity to ask questions with answers provided by my provider.     Patient Name (Printed): _____________________________________  Patient Signature:  ______________________   Date: _____________    Prescriber Name (Printed): ___________________________________  Prescriber Signature: _____________________  Date: _____________

## 2022-07-25 NOTE — PATIENT INSTRUCTIONS
New Updates for [facility] MyChart/ [facility] ([facility]) [facility]    Thank you for choosing US to give you the best care! [employer] ([facility]) is always trying to think of new ways to help their patients. We are asking all patients to try out the new digital registration that is now available through your [facility] account or the new [place], [facility] ([facility]). Via the mason you're now able to update your personal and registration information prior to your upcoming appointment. This will save you time once you arrive at the office to check-in, not to mention your information remains safe!! Many other perks come from signing up for an account, such as:  Requesting refills  Scheduling an appointment  Completing an E-Visit  Sending a message to the office/provider  Having access to your medication list  Paying your bill/copay prior to your appointment  Scheduling your yearly mammogram  Review your test results    If you are not familiar with [facility] or the [facility] ([facility]) [facility], please ask one of us and we will be happy to answer any questions or help you set-up your account.       Your [facility] office,  [place]

## 2022-08-15 DIAGNOSIS — G89.29 CHRONIC NECK PAIN: ICD-10-CM

## 2022-08-15 DIAGNOSIS — M54.41 CHRONIC MIDLINE LOW BACK PAIN WITH RIGHT-SIDED SCIATICA: ICD-10-CM

## 2022-08-15 DIAGNOSIS — M54.2 CHRONIC NECK PAIN: ICD-10-CM

## 2022-08-15 DIAGNOSIS — M25.561 CHRONIC PAIN OF RIGHT KNEE: ICD-10-CM

## 2022-08-15 DIAGNOSIS — G89.29 CHRONIC PAIN OF RIGHT KNEE: ICD-10-CM

## 2022-08-15 DIAGNOSIS — G89.29 CHRONIC MIDLINE LOW BACK PAIN WITH RIGHT-SIDED SCIATICA: ICD-10-CM

## 2022-08-15 RX ORDER — TIZANIDINE 2 MG/1
TABLET ORAL
Qty: 60 TABLET | Refills: 2 | Status: SHIPPED | OUTPATIENT
Start: 2022-08-15 | End: 2022-09-26 | Stop reason: SDUPTHER

## 2022-08-29 DIAGNOSIS — G89.29 CHRONIC NECK PAIN: ICD-10-CM

## 2022-08-29 DIAGNOSIS — M54.2 CHRONIC NECK PAIN: ICD-10-CM

## 2022-08-29 DIAGNOSIS — M54.41 CHRONIC MIDLINE LOW BACK PAIN WITH RIGHT-SIDED SCIATICA: ICD-10-CM

## 2022-08-29 DIAGNOSIS — G89.29 CHRONIC MIDLINE LOW BACK PAIN WITH RIGHT-SIDED SCIATICA: ICD-10-CM

## 2022-08-29 RX ORDER — GABAPENTIN 100 MG/1
CAPSULE ORAL
Qty: 90 CAPSULE | Refills: 0 | Status: SHIPPED | OUTPATIENT
Start: 2022-08-29 | End: 2022-10-04 | Stop reason: SDUPTHER

## 2022-08-29 RX ORDER — GABAPENTIN 100 MG/1
100 CAPSULE ORAL 3 TIMES DAILY
Qty: 90 CAPSULE | Refills: 0 | OUTPATIENT
Start: 2022-08-29 | End: 2022-09-28

## 2022-09-01 ENCOUNTER — HOSPITAL ENCOUNTER (OUTPATIENT)
Dept: PAIN MANAGEMENT | Age: 42
Discharge: HOME | End: 2022-09-01
Payer: COMMERCIAL

## 2022-09-01 VITALS
HEART RATE: 71 BPM | WEIGHT: 157 LBS | HEIGHT: 60 IN | BODY MASS INDEX: 30.82 KG/M2 | OXYGEN SATURATION: 99 % | DIASTOLIC BLOOD PRESSURE: 101 MMHG | TEMPERATURE: 97.3 F | SYSTOLIC BLOOD PRESSURE: 149 MMHG

## 2022-09-01 DIAGNOSIS — M54.2 CHRONIC NECK PAIN: Primary | ICD-10-CM

## 2022-09-01 DIAGNOSIS — G89.29 CHRONIC MIDLINE LOW BACK PAIN WITH RIGHT-SIDED SCIATICA: ICD-10-CM

## 2022-09-01 DIAGNOSIS — G89.29 CHRONIC NECK PAIN: Primary | ICD-10-CM

## 2022-09-01 DIAGNOSIS — M54.41 CHRONIC MIDLINE LOW BACK PAIN WITH RIGHT-SIDED SCIATICA: ICD-10-CM

## 2022-09-01 PROCEDURE — 99203 OFFICE O/P NEW LOW 30 MIN: CPT

## 2022-09-01 PROCEDURE — 99214 OFFICE O/P EST MOD 30 MIN: CPT | Performed by: ANESTHESIOLOGY

## 2022-09-01 ASSESSMENT — ENCOUNTER SYMPTOMS
BACK PAIN: 1
GASTROINTESTINAL NEGATIVE: 1
ALLERGIC/IMMUNOLOGIC NEGATIVE: 1
RESPIRATORY NEGATIVE: 1
EYES NEGATIVE: 1

## 2022-09-01 NOTE — PROGRESS NOTES
The patient is a 39 y. o.  /  female. Chief Complaint   Patient presents with    New Patient    Back Pain    Neck Pain        HPI    Requesting physician for the evaluation of Alejandra Espinoza 1980:     Pain History  40-year-old female with history of chronic neck and back pain  Onset of symptom more than 10 years ago  Identified pain location in neck with radiation down right arm associated with right arm numbness and tingling  Also pain located in the right side lower lumbar area with radiation down right leg all the way to the foot associated with intermittent right leg numbness and tingling  No changes in bladder or bowel control  No history fever chills or weight loss  No previous cervical or lumbar spine surgical history  Recalls lumbar spine injection more than 10 years ago  No previous MRI lumbar spine or cervical spine  No previous physical therapy  Currently taking gabapentin and Zanaflex    Pain has been progressively worsening affecting quality of life describes it as aching throbbing burning sensation    Past history significant for opioid dependency and currently on Suboxone treatment  Pain score today  7  1. Location: back & neck  2. Radiation: pain goes down Right arm & Right leg  3. Character: shooting, throbbing, aching  5. Duration:   6. Onset: years  7. Did an injury cause pain: no  8. Aggravating factors: sudden movement, lifting, sitting, standing  9. Alleviating factors: heat  10. Associated symptoms (numbness / tingling / weakness):  yes  -Where at: Right hand  -Down into finger tips or toes (specify which finger or toes): finger tips  -constant or intermitting:  constant  11. Red Flags: (weight loss / chills / loss of bladder or bowel control): none    Previous management history  1.  Previous diagnostic workup: (Imaging/EMG)   CT, MRI, or Xray:  xray  What part of the body: back & neck  What [facility]  What year or specific date: 05/24/2022  EMG: no    2. Previous non interventional treatments tried:  chiropractor or physical therapy: n/a  What part of the body: n/a  What facility was it done at:  n/a  How long ago was it last tried: protect  n/a  Did it work:  n/a  Did they complete it: n/a    3. Previous Medications tried  NSAID's: yes  Neurontin: yes  Lyrica: no  Trycyclic antidepressant (Ellavil / Pamelor ): no  Cymbalta: no  Opioids (Ultram / Vicodin / Percocet / Morphine / Dilaudid / Oramorph/ Fentanyl etc.): none  Last Pain medication taken (name of med and date):    4. Previous Interventional pain procedures tried:  What kind of injection: GIUSEPPE  Who did the injection:  [facility]  did the injection help: yes  Last time injection was done: about 10 years ago    5.  Previous surgeries for pain  What part of the body did they have the surgery: n/a  What physician did the surgery: [facility] did they have the surgery done: n/a  Date of Surgery:N/A    Social History:  Marital status: single  Employment History:   Working  No  Full time Or Part time:    Disability  No   Legal Issues related to pain complaint: No     Pain Disability Index score :      Lab Results   Component Value Date    LABA1C 5.1 03/21/2022     Lab Results   Component Value Date     03/21/2022         Informant: patient     Past Medical History:   Diagnosis Date    Anxiety 2/13/2015    Chronic midline low back pain with right-sided sciatica 5/24/2022    Degeneration of cervical intervertebral disc 12/21/2020    Depression 12/5/2012    Essential hypertension 12/21/2020    Gastro-esophageal reflux disease without esophagitis 12/21/2020    Lumbar radiculopathy, acute 2/27/2013    Opioid use, unspecified with other opioid-induced disorder (HCC)(Suboxone) 12/21/2020        Past Surgical History:   Procedure Laterality Date    TUBAL LIGATION  01/27/2022       Social History     Socioeconomic History    Marital status: Single     Spouse name: None    Number of children: None Years of education: None    Highest education level: None   Tobacco Use    Smoking status: Former     Years: 10.00     Types: Cigarettes    Smokeless tobacco: Never    Tobacco comments:     1 pack every two days   Substance and Sexual Activity    Alcohol use: Not Currently     Comment: social    Drug use: No     Social Determinants of Health     Financial Resource Strain: Low Risk     Difficulty of Paying Living Expenses: Not hard at all   Food Insecurity: No Food Insecurity    Worried About Running Out of Food in the Last Year: Never true    Ran Out of Food in the Last Year: Never true   Transportation Needs: No Transportation Needs    Lack of Transportation (Medical): No    Lack of Transportation (Non-Medical): No   Housing Stability: Unknown    Unable to Pay for Housing in the Last Year: No    Unstable Housing in the Last Year: No       Family History   Problem Relation Age of Onset    Heart Disease Mother     Breast Cancer Mother 36    Diabetes Father     Breast Cancer Maternal Aunt 40       Allergies   Allergen Reactions    Other      seasonal       Vitals:    09/01/22 1054   BP: (!) 149/101   Pulse: 71   Temp: 97.3 °F (36.3 °C)   SpO2: 99%       Current Outpatient Medications   Medication Sig Dispense Refill    gabapentin (NEURONTIN) 100 MG capsule TAKE 1 CAPSULE BY MOUTH EVERY MORNING, 1 CAPSULE AT NOON AND 1 CAPSULE BEFORE BEDTIME 90 capsule 0    tiZANidine (ZANAFLEX) 2 MG tablet TAKE 1 TO 2 TABLETS BY MOUTH AT NIGHT 60 tablet 2    lisinopril (PRINIVIL;ZESTRIL) 5 MG tablet Take 1 tablet by mouth in the morning.  90 tablet 2    clotrimazole-betamethasone (LOTRISONE) 1-0.05 % cream APPLY TOPICALLY TO AFFECTED AREAS every morning and BEFORE BEDTIME      fluticasone (FLONASE) 50 MCG/ACT nasal spray INSTILL 1 SPRAY BY EACH NOSTRIL ROUTE DAILY 16 g 2    naproxen (NAPROSYN) 500 MG tablet Take 1 tablet by mouth 2 times daily (with meals) 180 tablet 1    naloxone 4 MG/0.1ML LIQD nasal spray 1 spray by Nasal route as needed      Prenatal Vit-Fe Fumarate-FA (PRENATAL VITAMINS) 28-0.8 MG TABS Take 1 tablet by mouth daily 90 tablet 2    vitamin D (CHOLECALCIFEROL) 25 MCG (1000 UT) TABS tablet Take 1 tablet by mouth daily 90 tablet 5    Nutritional Supplements (NUTRITIONAL SHAKE HIGH PROTEIN) LIQD Take 1 Units by mouth daily 30 each 2    aspirin 81 MG EC tablet Take 1 tablet by mouth daily      buprenorphine-naloxone (SUBOXONE) 8-2 MG FILM SL film        No current facility-administered medications for this encounter. Review of Systems   Constitutional: Negative. Negative for fever. HENT: Negative. Eyes: Negative. Respiratory: Negative. Cardiovascular: Negative. Gastrointestinal: Negative. Endocrine: Negative. Genitourinary: Negative. Musculoskeletal:  Positive for back pain and neck pain. Skin: Negative. Allergic/Immunologic: Negative. Neurological:  Positive for numbness. Hematological: Negative. Psychiatric/Behavioral: Negative. Objective:  General Appearance:  Well-appearing, in no acute distress, comfortable and in pain. Vital signs: (most recent): Blood pressure (!) 149/101, pulse 71, temperature 97.3 °F (36.3 °C), height 5' (1.524 m), weight 157 lb (71.2 kg), SpO2 99 %, not currently breastfeeding. Vital signs are normal.  No fever. Output: Producing urine and producing stool. HEENT: Normal HEENT exam.    Lungs:  Normal effort and normal respiratory rate. She is not in respiratory distress. Heart: Normal rate. Extremities: Normal range of motion. There is no deformity. Neurological: Patient is alert and oriented to person, place and time. Normal strength. Patient has normal muscle tone and normal coordination. Pupils:  Pupils are equal, round, and reactive to light. Pupils are equal.   Skin:  Warm and dry. No rash or cyanosis.      Cervical spinal examination  No apparent deformity  Range of motion preserved  Spurling maneuver negative    Lumbar spine examination  No apparent deformity  Range of motion preserved  Pain with lumbar spine extension  Tenderness to palpation right side in the lower lumbar area  Gait stable    Assessment & Plan  Pain History  66-year-old female with history of chronic neck and back pain  Onset of symptom more than 10 years ago  Identified pain location in neck with radiation down right arm associated with right arm numbness and tingling  Also pain located in the right side lower lumbar area with radiation down right leg all the way to the foot associated with intermittent right leg numbness and tingling  No changes in bladder or bowel control  No history fever chills or weight loss  No previous cervical or lumbar spine surgical history  Recalls lumbar spine injection more than 10 years ago  No previous MRI lumbar spine or cervical spine  No previous physical therapy  Currently taking gabapentin and Zanaflex    Pain has been progressively worsening affecting quality of life describes it as aching throbbing burning sensation    Past history significant for opioid dependency and currently on Suboxone treatment  Pain score today  7    1. Chronic neck pain    2. Chronic midline low back pain with right-sided sciatica        Chronic neck and low back pain with right arm and right lower extremity pain    Will need MRI imaging  No formal physical therapy in last 10 years  Will refer patient for physical therapy  Advised to continue gabapentin and Zanaflex    Follow-up in 6 weeks after therapy Will consider for obtaining MRI imaging at that point    Consultation note sent to the referring physician    Orders Placed This Encounter   Procedures    Ambulatory referral to Physical Therapy        No orders of the defined types were placed in this encounter.            Electronically signed by Deborah Alvarenga MD on 9/1/2022 at 11:30 AM

## 2022-09-01 NOTE — PROGRESS NOTES
The patient is a 39 y. o.  /  female. Chief Complaint   Patient presents with    New Patient    Back Pain    Neck Pain        HPI    Requesting physician for the evaluation of Alejandar Espinoza 1980:     Pain History  Pain score today  7  1. Location: back & neck  2. Radiation: pain goes down Right arm & Right leg  3. Character: shooting, throbbing, aching  5. Duration:   6. Onset: years  7. Did an injury cause pain: no  8. Aggravating factors: sudden movement, lifting, sitting, standing  9. Alleviating factors: heat  10. Associated symptoms (numbness / tingling / weakness):  yes  -Where at: Right hand  -Down into finger tips or toes (specify which finger or toes): finger tips  -constant or intermitting:  constant  11. Red Flags: (weight loss / chills / loss of bladder or bowel control): none    Previous management history  1. Previous diagnostic workup: (Imaging/EMG)   CT, MRI, or Xray:  xray  What part of the body: back & neck  What facility [facility]  What year or specific date: 05/24/2022  EMG:  no    2. Previous non interventional treatments tried:  chiropractor or physical therapy: n/a  What part of the body: n/a  What facility was it done at:  n/a  How long ago was it last tried: n/a  Did it work:  n/a  Did they complete it: n/a    3. Previous Medications tried  NSAID's: yes  Neurontin: yes  Lyrica: no  Trycyclic antidepressant (Ellavil / Pamelor ): no  Cymbalta: no  Opioids (Ultram / Vicodin / Percocet / Morphine / Dilaudid / Oramorph/ Fentanyl etc.): none  Last Pain medication taken (name of med and date):    4. Previous Interventional pain procedures tried:  What kind of injection: GIUSEPPE  Who did the injection:  [facility]  did the injection help: yes  Last time injection was done: about 10 years ago    5.  Previous surgeries for pain  What part of the body did they have the surgery: n/a  What physician did the surgery: [facility] did they have the surgery done: n/a  Date of Surgery:N/A    Social History:  Marital status: single  Employment History:   Working  No  Full time Or Part time:    Disability  No   Legal Issues related to pain complaint: No     Pain Disability Index score :      Lab Results   Component Value Date    LABA1C 5.1 03/21/2022     Lab Results   Component Value Date     03/21/2022         Informant: patient     Past Medical History:   Diagnosis Date    Anxiety 2/13/2015    Chronic midline low back pain with right-sided sciatica 5/24/2022    Degeneration of cervical intervertebral disc 12/21/2020    Depression 12/5/2012    Essential hypertension 12/21/2020    Gastro-esophageal reflux disease without esophagitis 12/21/2020    Lumbar radiculopathy, acute 2/27/2013    Opioid use, unspecified with other opioid-induced disorder (HCC)(Suboxone) 12/21/2020        Past Surgical History:   Procedure Laterality Date    TUBAL LIGATION  01/27/2022       Social History     Socioeconomic History    Marital status: Single     Spouse name: None    Number of children: None    Years of education: None    Highest education level: None   Tobacco Use    Smoking status: Former     Years: 10.00     Types: Cigarettes    Smokeless tobacco: Never    Tobacco comments:     1 pack every two days   Substance and Sexual Activity    Alcohol use: Not Currently     Comment: social    Drug use: No     Social Determinants of Health     Financial Resource Strain: Low Risk     Difficulty of Paying Living Expenses: Not hard at all   Food Insecurity: No Food Insecurity    Worried About Running Out of Food in the Last Year: Never true    Ran Out of Food in the Last Year: Never true   Transportation Needs: No Transportation Needs    Lack of Transportation (Medical): No    Lack of Transportation (Non-Medical): No   Housing Stability: Unknown    Unable to Pay for Housing in the Last Year: No    Unstable Housing in the Last Year: No       Family History   Problem Relation Age of Onset    Heart Disease Mother     Breast Cancer Mother 36    Diabetes Father     Breast Cancer Maternal Aunt 40       Allergies   Allergen Reactions    Other      seasonal       There were no vitals filed for this visit. Current Outpatient Medications   Medication Sig Dispense Refill    gabapentin (NEURONTIN) 100 MG capsule TAKE 1 CAPSULE BY MOUTH EVERY MORNING, 1 CAPSULE AT NOON AND 1 CAPSULE BEFORE BEDTIME 90 capsule 0    tiZANidine (ZANAFLEX) 2 MG tablet TAKE 1 TO 2 TABLETS BY MOUTH AT NIGHT 60 tablet 2    lisinopril (PRINIVIL;ZESTRIL) 5 MG tablet Take 1 tablet by mouth in the morning. 90 tablet 2    clotrimazole-betamethasone (LOTRISONE) 1-0.05 % cream APPLY TOPICALLY TO AFFECTED AREAS every morning and BEFORE BEDTIME      fluticasone (FLONASE) 50 MCG/ACT nasal spray INSTILL 1 SPRAY BY EACH NOSTRIL ROUTE DAILY 16 g 2    naproxen (NAPROSYN) 500 MG tablet Take 1 tablet by mouth 2 times daily (with meals) 180 tablet 1    naloxone 4 MG/0.1ML LIQD nasal spray 1 spray by Nasal route as needed      Prenatal Vit-Fe Fumarate-FA (PRENATAL VITAMINS) 28-0.8 MG TABS Take 1 tablet by mouth daily 90 tablet 2    vitamin D (CHOLECALCIFEROL) 25 MCG (1000 UT) TABS tablet Take 1 tablet by mouth daily 90 tablet 5    Nutritional Supplements (NUTRITIONAL SHAKE HIGH PROTEIN) LIQD Take 1 Units by mouth daily 30 each 2    aspirin 81 MG EC tablet Take 1 tablet by mouth daily      buprenorphine-naloxone (SUBOXONE) 8-2 MG FILM SL film        No current facility-administered medications for this encounter. Review of Systems   Constitutional: Negative. HENT: Negative. Eyes: Negative. Respiratory: Negative. Cardiovascular: Negative. Gastrointestinal: Negative. Endocrine: Negative. Genitourinary: Negative. Musculoskeletal:  Positive for back pain and neck pain. Skin: Negative. Allergic/Immunologic: Negative. Neurological:  Positive for numbness. Hematological: Negative. Psychiatric/Behavioral: Negative. Objective:  Vital signs: (most recent): not currently breastfeeding. Assessment & Plan  No diagnosis found. No orders of the defined types were placed in this encounter. No orders of the defined types were placed in this encounter.            Electronically signed by Liliya Yen MA on 9/1/2022 at 10:45 AM

## 2022-09-26 DIAGNOSIS — M54.41 CHRONIC MIDLINE LOW BACK PAIN WITH RIGHT-SIDED SCIATICA: ICD-10-CM

## 2022-09-26 DIAGNOSIS — M25.561 CHRONIC PAIN OF RIGHT KNEE: ICD-10-CM

## 2022-09-26 DIAGNOSIS — G89.29 CHRONIC NECK PAIN: ICD-10-CM

## 2022-09-26 DIAGNOSIS — G89.29 CHRONIC PAIN OF RIGHT KNEE: ICD-10-CM

## 2022-09-26 DIAGNOSIS — M54.2 CHRONIC NECK PAIN: ICD-10-CM

## 2022-09-26 DIAGNOSIS — G89.29 CHRONIC MIDLINE LOW BACK PAIN WITH RIGHT-SIDED SCIATICA: ICD-10-CM

## 2022-09-26 RX ORDER — TIZANIDINE 2 MG/1
TABLET ORAL
Qty: 60 TABLET | Refills: 2 | Status: SHIPPED | OUTPATIENT
Start: 2022-09-26

## 2022-09-28 ENCOUNTER — APPOINTMENT (OUTPATIENT)
Dept: CT IMAGING | Age: 42
DRG: 720 | End: 2022-09-28
Payer: COMMERCIAL

## 2022-09-28 ENCOUNTER — APPOINTMENT (OUTPATIENT)
Dept: GENERAL RADIOLOGY | Age: 42
DRG: 720 | End: 2022-09-28
Payer: COMMERCIAL

## 2022-09-28 ENCOUNTER — HOSPITAL ENCOUNTER (INPATIENT)
Age: 42
LOS: 6 days | Discharge: HOME | DRG: 720 | End: 2022-10-04
Attending: EMERGENCY MEDICINE | Admitting: INTERNAL MEDICINE
Payer: COMMERCIAL

## 2022-09-28 DIAGNOSIS — A41.9 SEPSIS, DUE TO UNSPECIFIED ORGANISM, UNSPECIFIED WHETHER ACUTE ORGAN DYSFUNCTION PRESENT (HCC): ICD-10-CM

## 2022-09-28 DIAGNOSIS — J96.01 ACUTE RESPIRATORY FAILURE WITH HYPOXIA (HCC): ICD-10-CM

## 2022-09-28 DIAGNOSIS — R06.82 TACHYPNEA: Primary | ICD-10-CM

## 2022-09-28 DIAGNOSIS — J18.9 PNEUMONIA OF BOTH LOWER LOBES DUE TO INFECTIOUS ORGANISM: ICD-10-CM

## 2022-09-28 PROBLEM — E87.6 HYPOKALEMIA: Status: ACTIVE | Noted: 2022-09-28

## 2022-09-28 LAB
ABSOLUTE EOS #: 0 K/UL (ref 0–0.4)
ABSOLUTE IMMATURE GRANULOCYTE: 0.28 K/UL (ref 0–0.3)
ABSOLUTE LYMPH #: 0.83 K/UL (ref 1–4.8)
ABSOLUTE MONO #: 0.83 K/UL (ref 0.1–0.8)
ALBUMIN SERPL-MCNC: 3.5 G/DL (ref 3.5–5.2)
ALBUMIN/GLOBULIN RATIO: 1 (ref 1–2.5)
ALP BLD-CCNC: 55 U/L (ref 35–104)
ALT SERPL-CCNC: 6 U/L (ref 5–33)
ANION GAP SERPL CALCULATED.3IONS-SCNC: 12 MMOL/L (ref 9–17)
ANION GAP: 12 MMOL/L (ref 7–16)
AST SERPL-CCNC: 14 U/L
BASOPHILS # BLD: 0 % (ref 0–2)
BASOPHILS ABSOLUTE: 0 K/UL (ref 0–0.2)
BILIRUB SERPL-MCNC: 0.9 MG/DL (ref 0.3–1.2)
BILIRUBIN URINE: NEGATIVE
BUN BLDV-MCNC: 9 MG/DL (ref 6–20)
CALCIUM SERPL-MCNC: 8.3 MG/DL (ref 8.6–10.4)
CARBOXYHEMOGLOBIN: 1.7 % (ref 0–5)
CHLORIDE BLD-SCNC: 98 MMOL/L (ref 98–107)
CO2: 21 MMOL/L (ref 20–31)
COLOR: YELLOW
CREAT SERPL-MCNC: 0.68 MG/DL (ref 0.5–0.9)
EOSINOPHILS RELATIVE PERCENT: 0 % (ref 1–4)
EPITHELIAL CELLS UA: NORMAL /HPF (ref 0–5)
FIO2: ABNORMAL
GFR AFRICAN AMERICAN: >60 ML/MIN
GFR NON-AFRICAN AMERICAN: >60 ML/MIN
GFR SERPL CREATININE-BSD FRML MDRD: ABNORMAL ML/MIN/{1.73_M2}
GLUCOSE BLD-MCNC: 112 MG/DL (ref 70–99)
GLUCOSE URINE: NEGATIVE
HCG QUALITATIVE: NEGATIVE
HCO3 VENOUS: 23.9 MMOL/L (ref 24–30)
HCO3 VENOUS: 24.9 MMOL/L (ref 22–29)
HCT VFR BLD CALC: 35.4 % (ref 36.3–47.1)
HEMOGLOBIN: 11.8 G/DL (ref 11.9–15.1)
IMMATURE GRANULOCYTES: 2 %
INR BLD: 1.3
KETONES, URINE: NEGATIVE
LACTIC ACID, SEPSIS WHOLE BLOOD: 1.3 MMOL/L (ref 0.5–1.9)
LACTIC ACID, WHOLE BLOOD: 1.2 MMOL/L (ref 0.7–2.1)
LEUKOCYTE ESTERASE, URINE: NEGATIVE
LYMPHOCYTES # BLD: 6 % (ref 24–44)
MCH RBC QN AUTO: 30.3 PG (ref 25.2–33.5)
MCHC RBC AUTO-ENTMCNC: 33.3 G/DL (ref 28.4–34.8)
MCV RBC AUTO: 90.8 FL (ref 82.6–102.9)
MONOCYTES # BLD: 6 % (ref 1–7)
MORPHOLOGY: ABNORMAL
NITRITE, URINE: NEGATIVE
NRBC AUTOMATED: 0 PER 100 WBC
O2 SAT, VEN: 58 % (ref 60–85)
O2 SAT, VEN: 82.3 % (ref 60–85)
PARTIAL THROMBOPLASTIN TIME: 32.1 SEC (ref 20.5–30.5)
PATIENT TEMP: 37
PCO2, VEN: 38 MM HG (ref 39–55)
PCO2, VEN: 40.1 MM HG (ref 41–51)
PDW BLD-RTO: 14.2 % (ref 11.8–14.4)
PH UA: 7.5 (ref 5–8)
PH VENOUS: 7.4 (ref 7.32–7.43)
PH VENOUS: 7.42 (ref 7.32–7.42)
PLATELET # BLD: 154 K/UL (ref 138–453)
PMV BLD AUTO: 11.7 FL (ref 8.1–13.5)
PO2, VEN: 30.4 MM HG (ref 30–50)
PO2, VEN: 48.8 MM HG (ref 30–50)
POC CHLORIDE: 99 MMOL/L (ref 98–107)
POC IONIZED CALCIUM: 1.08 MMOL/L (ref 1.15–1.33)
POC POTASSIUM: 3.2 MMOL/L (ref 3.5–4.5)
POC SODIUM: 136 MMOL/L (ref 138–146)
POSITIVE BASE EXCESS, VEN: 0 (ref 0–3)
POSITIVE BASE EXCESS, VEN: 0 MMOL/L (ref 0–2)
POTASSIUM SERPL-SCNC: 3.3 MMOL/L (ref 3.7–5.3)
PRO-BNP: 235 PG/ML
PROTEIN UA: NEGATIVE
PROTHROMBIN TIME: 12.7 SEC (ref 9.1–12.3)
RBC # BLD: 3.9 M/UL (ref 3.95–5.11)
RBC UA: NORMAL /HPF (ref 0–4)
SARS-COV-2, RAPID: NOT DETECTED
SEG NEUTROPHILS: 86 % (ref 36–66)
SEGMENTED NEUTROPHILS ABSOLUTE COUNT: 11.86 K/UL (ref 1.8–7.7)
SODIUM BLD-SCNC: 131 MMOL/L (ref 135–144)
SPECIFIC GRAVITY UA: 1 (ref 1–1.03)
SPECIMEN DESCRIPTION: NORMAL
TOTAL PROTEIN: 6.9 G/DL (ref 6.4–8.3)
TROPONIN, HIGH SENSITIVITY: <6 NG/L (ref 0–14)
TROPONIN, HIGH SENSITIVITY: <6 NG/L (ref 0–14)
TURBIDITY: CLEAR
URINE HGB: NEGATIVE
UROBILINOGEN, URINE: NORMAL
WBC # BLD: 13.8 K/UL (ref 3.5–11.3)
WBC UA: NORMAL /HPF (ref 0–5)

## 2022-09-28 PROCEDURE — 96374 THER/PROPH/DIAG INJ IV PUSH: CPT

## 2022-09-28 PROCEDURE — 87086 URINE CULTURE/COLONY COUNT: CPT

## 2022-09-28 PROCEDURE — 2500000003 HC RX 250 WO HCPCS: Performed by: HEALTH CARE PROVIDER

## 2022-09-28 PROCEDURE — 71045 X-RAY EXAM CHEST 1 VIEW: CPT

## 2022-09-28 PROCEDURE — 5A1945Z RESPIRATORY VENTILATION, 24-96 CONSECUTIVE HOURS: ICD-10-PCS | Performed by: INTERNAL MEDICINE

## 2022-09-28 PROCEDURE — 81001 URINALYSIS AUTO W/SCOPE: CPT

## 2022-09-28 PROCEDURE — 6360000002 HC RX W HCPCS

## 2022-09-28 PROCEDURE — 85025 COMPLETE CBC W/AUTO DIFF WBC: CPT

## 2022-09-28 PROCEDURE — 87040 BLOOD CULTURE FOR BACTERIA: CPT

## 2022-09-28 PROCEDURE — 2500000003 HC RX 250 WO HCPCS

## 2022-09-28 PROCEDURE — 84295 ASSAY OF SERUM SODIUM: CPT

## 2022-09-28 PROCEDURE — 82435 ASSAY OF BLOOD CHLORIDE: CPT

## 2022-09-28 PROCEDURE — 83880 ASSAY OF NATRIURETIC PEPTIDE: CPT

## 2022-09-28 PROCEDURE — 85610 PROTHROMBIN TIME: CPT

## 2022-09-28 PROCEDURE — 85730 THROMBOPLASTIN TIME PARTIAL: CPT

## 2022-09-28 PROCEDURE — 31500 INSERT EMERGENCY AIRWAY: CPT

## 2022-09-28 PROCEDURE — 82805 BLOOD GASES W/O2 SATURATION: CPT

## 2022-09-28 PROCEDURE — 83605 ASSAY OF LACTIC ACID: CPT

## 2022-09-28 PROCEDURE — 82803 BLOOD GASES ANY COMBINATION: CPT

## 2022-09-28 PROCEDURE — 6370000000 HC RX 637 (ALT 250 FOR IP)

## 2022-09-28 PROCEDURE — 80053 COMPREHEN METABOLIC PANEL: CPT

## 2022-09-28 PROCEDURE — 84132 ASSAY OF SERUM POTASSIUM: CPT

## 2022-09-28 PROCEDURE — 6360000002 HC RX W HCPCS: Performed by: HEALTH CARE PROVIDER

## 2022-09-28 PROCEDURE — 99285 EMERGENCY DEPT VISIT HI MDM: CPT

## 2022-09-28 PROCEDURE — 2000000000 HC ICU R&B

## 2022-09-28 PROCEDURE — 93005 ELECTROCARDIOGRAM TRACING: CPT

## 2022-09-28 PROCEDURE — 82330 ASSAY OF CALCIUM: CPT

## 2022-09-28 PROCEDURE — 2580000003 HC RX 258: Performed by: HEALTH CARE PROVIDER

## 2022-09-28 PROCEDURE — 0BH18EZ INSERTION OF ENDOTRACHEAL AIRWAY INTO TRACHEA, VIA NATURAL OR ARTIFICIAL OPENING ENDOSCOPIC: ICD-10-PCS | Performed by: EMERGENCY MEDICINE

## 2022-09-28 PROCEDURE — 87635 SARS-COV-2 COVID-19 AMP PRB: CPT

## 2022-09-28 PROCEDURE — 36415 COLL VENOUS BLD VENIPUNCTURE: CPT

## 2022-09-28 PROCEDURE — 84484 ASSAY OF TROPONIN QUANT: CPT

## 2022-09-28 PROCEDURE — 6360000004 HC RX CONTRAST MEDICATION

## 2022-09-28 PROCEDURE — 71260 CT THORAX DX C+: CPT

## 2022-09-28 PROCEDURE — 2580000003 HC RX 258: Performed by: STUDENT IN AN ORGANIZED HEALTH CARE EDUCATION/TRAINING PROGRAM

## 2022-09-28 PROCEDURE — 84703 CHORIONIC GONADOTROPIN ASSAY: CPT

## 2022-09-28 RX ORDER — SODIUM CHLORIDE 9 MG/ML
INJECTION, SOLUTION INTRAVENOUS PRN
Status: DISCONTINUED | OUTPATIENT
Start: 2022-09-28 | End: 2022-09-29

## 2022-09-28 RX ORDER — POLYETHYLENE GLYCOL 3350 17 G/17G
17 POWDER, FOR SOLUTION ORAL DAILY PRN
Status: DISCONTINUED | OUTPATIENT
Start: 2022-09-28 | End: 2022-10-04 | Stop reason: HOSPADM

## 2022-09-28 RX ORDER — SODIUM CHLORIDE 0.9 % (FLUSH) 0.9 %
5-40 SYRINGE (ML) INJECTION PRN
Status: DISCONTINUED | OUTPATIENT
Start: 2022-09-28 | End: 2022-10-04 | Stop reason: HOSPADM

## 2022-09-28 RX ORDER — FENTANYL CITRATE 50 UG/ML
INJECTION, SOLUTION INTRAMUSCULAR; INTRAVENOUS
Status: COMPLETED
Start: 2022-09-28 | End: 2022-09-28

## 2022-09-28 RX ORDER — FENTANYL CITRATE 50 UG/ML
100 INJECTION, SOLUTION INTRAMUSCULAR; INTRAVENOUS ONCE
Status: COMPLETED | OUTPATIENT
Start: 2022-09-28 | End: 2022-09-28

## 2022-09-28 RX ORDER — SENNA AND DOCUSATE SODIUM 50; 8.6 MG/1; MG/1
1 TABLET, FILM COATED ORAL 2 TIMES DAILY
Status: DISCONTINUED | OUTPATIENT
Start: 2022-09-28 | End: 2022-10-04 | Stop reason: HOSPADM

## 2022-09-28 RX ORDER — MIDAZOLAM HYDROCHLORIDE 2 MG/2ML
2 INJECTION, SOLUTION INTRAMUSCULAR; INTRAVENOUS ONCE
Status: COMPLETED | OUTPATIENT
Start: 2022-09-28 | End: 2022-09-28

## 2022-09-28 RX ORDER — ONDANSETRON 4 MG/1
4 TABLET, ORALLY DISINTEGRATING ORAL EVERY 8 HOURS PRN
Status: DISCONTINUED | OUTPATIENT
Start: 2022-09-28 | End: 2022-10-04 | Stop reason: HOSPADM

## 2022-09-28 RX ORDER — ONDANSETRON 2 MG/ML
4 INJECTION INTRAMUSCULAR; INTRAVENOUS EVERY 6 HOURS PRN
Status: DISCONTINUED | OUTPATIENT
Start: 2022-09-28 | End: 2022-10-04 | Stop reason: HOSPADM

## 2022-09-28 RX ORDER — SODIUM CHLORIDE 0.9 % (FLUSH) 0.9 %
5-40 SYRINGE (ML) INJECTION EVERY 12 HOURS SCHEDULED
Status: DISCONTINUED | OUTPATIENT
Start: 2022-09-28 | End: 2022-10-04 | Stop reason: HOSPADM

## 2022-09-28 RX ORDER — ACETAMINOPHEN 500 MG
1000 TABLET ORAL ONCE
Status: COMPLETED | OUTPATIENT
Start: 2022-09-28 | End: 2022-09-28

## 2022-09-28 RX ORDER — ACETAMINOPHEN 650 MG/1
650 SUPPOSITORY RECTAL EVERY 6 HOURS PRN
Status: DISCONTINUED | OUTPATIENT
Start: 2022-09-28 | End: 2022-09-29

## 2022-09-28 RX ORDER — PROPOFOL 10 MG/ML
INJECTION, EMULSION INTRAVENOUS
Status: COMPLETED
Start: 2022-09-28 | End: 2022-09-28

## 2022-09-28 RX ORDER — ACETAMINOPHEN 325 MG/1
650 TABLET ORAL EVERY 6 HOURS PRN
Status: DISCONTINUED | OUTPATIENT
Start: 2022-09-28 | End: 2022-09-29

## 2022-09-28 RX ORDER — PROPOFOL 10 MG/ML
5-50 INJECTION, EMULSION INTRAVENOUS CONTINUOUS
Status: DISCONTINUED | OUTPATIENT
Start: 2022-09-28 | End: 2022-09-29

## 2022-09-28 RX ORDER — SODIUM CHLORIDE 9 MG/ML
INJECTION, SOLUTION INTRAVENOUS CONTINUOUS
Status: DISCONTINUED | OUTPATIENT
Start: 2022-09-28 | End: 2022-10-01

## 2022-09-28 RX ORDER — MIDAZOLAM HYDROCHLORIDE 5 MG/ML
INJECTION INTRAMUSCULAR; INTRAVENOUS
Status: DISCONTINUED
Start: 2022-09-28 | End: 2022-09-29

## 2022-09-28 RX ADMIN — CEFTRIAXONE SODIUM 1000 MG: 1 INJECTION, POWDER, FOR SOLUTION INTRAMUSCULAR; INTRAVENOUS at 19:18

## 2022-09-28 RX ADMIN — ACETAMINOPHEN 1000 MG: 500 TABLET ORAL at 17:50

## 2022-09-28 RX ADMIN — Medication 500 MG: at 20:26

## 2022-09-28 RX ADMIN — PROPOFOL 50 MCG/KG/MIN: 10 INJECTION, EMULSION INTRAVENOUS at 22:59

## 2022-09-28 RX ADMIN — Medication 2 MG/HR: at 22:43

## 2022-09-28 RX ADMIN — FENTANYL CITRATE 100 MCG: 50 INJECTION, SOLUTION INTRAMUSCULAR; INTRAVENOUS at 22:41

## 2022-09-28 RX ADMIN — MIDAZOLAM HYDROCHLORIDE 2 MG: 1 INJECTION, SOLUTION INTRAMUSCULAR; INTRAVENOUS at 22:42

## 2022-09-28 RX ADMIN — IOPAMIDOL 75 ML: 755 INJECTION, SOLUTION INTRAVENOUS at 19:22

## 2022-09-28 RX ADMIN — SODIUM CHLORIDE: 9 INJECTION, SOLUTION INTRAVENOUS at 23:28

## 2022-09-28 RX ADMIN — Medication 50 MCG/HR: at 23:02

## 2022-09-28 ASSESSMENT — PAIN DESCRIPTION - LOCATION: LOCATION: GENERALIZED

## 2022-09-28 ASSESSMENT — PULMONARY FUNCTION TESTS: PIF_VALUE: 24

## 2022-09-28 ASSESSMENT — PAIN - FUNCTIONAL ASSESSMENT: PAIN_FUNCTIONAL_ASSESSMENT: 0-10

## 2022-09-28 ASSESSMENT — PAIN SCALES - GENERAL: PAINLEVEL_OUTOF10: 6

## 2022-09-28 NOTE — ED NOTES
Pt arrived to room 14 from triage sating at 83% on RA.  Patient put on non-rebreathe and sating 89%  Respiratory at bedside      Kathy Bhandari RN  09/28/22 0569

## 2022-09-28 NOTE — ED PROVIDER NOTES
[facility]  ED  Emergency Department Encounter  Emergency Medicine Resident     Pt Name:Alejandra Richey  MRN: [id number]  [place] 1980  Date of evaluation: 9/28/22  PCP:  TABITHA Duarte CNP      CHIEF COMPLAINT       Chief Complaint   Patient presents with    Influenza    Illness     C/o fever, SOB, cough, headache and congestion x 3 days       HISTORY OF PRESENT ILLNESS  (Location/Symptom, Timing/Onset, Context/Setting, Quality, Duration, Modifying Factors, Severity.)      Catherine Kemp is a 39 y.o. female who presents with complaints of fever yesterday at 102, shortness of breath over the past 3 days associated with cough, congestion, body aches over the past 3 days as well. Notes history of smoking and prior VTE. Denied any chest pain. PAST MEDICAL / SURGICAL / SOCIAL / FAMILY HISTORY      has a past medical history of Anxiety, Chronic midline low back pain with right-sided sciatica, Degeneration of cervical intervertebral disc, Depression, Essential hypertension, Gastro-esophageal reflux disease without esophagitis, Lumbar radiculopathy, acute, and Opioid use, unspecified with other opioid-induced disorder (HCC)(Suboxone). Reviewed with patient     has a past surgical history that includes Tubal ligation (01/27/2022).   Reviewed with patient    Social History     Socioeconomic History    Marital status: Single     Spouse name: Not on file    Number of children: Not on file    Years of education: Not on file    Highest education level: Not on file   Occupational History    Not on file   Tobacco Use    Smoking status: Former     Years: 10.00     Types: Cigarettes    Smokeless tobacco: Never    Tobacco comments:     1 pack every two days   Substance and Sexual Activity    Alcohol use: Not Currently     Comment: social    Drug use: No    Sexual activity: Not on file   Other Topics Concern    Not on file   Social History Narrative    Not on file     Social Determinants of Health     Financial Resource Strain: Low Risk     Difficulty of Paying Living Expenses: Not hard at all   Food Insecurity: No Food Insecurity    Worried About [facility] in the Last Year: Never true    Ran Out of Food in the Last Year: Never true   Transportation Needs: No Transportation Needs    Lack of Transportation (Medical): No    Lack of Transportation (Non-Medical): No   Physical Activity: Not on file   Stress: Not on file   Social Connections: Not on file   Intimate Partner Violence: Not on file   Housing Stability: Unknown    Unable to Pay for Housing in the Last Year: No    Number of Places Lived in the Last Year: Not on file    Unstable Housing in the Last Year: No       Family History   Problem Relation Age of Onset    Heart Disease Mother     Breast Cancer Mother 36    Diabetes Father     Breast Cancer Maternal Aunt 40       Allergies: Other    Home Medications:  Prior to Admission medications    Medication Sig Start Date End Date Taking?  Authorizing Provider   tiZANidine (ZANAFLEX) 2 MG tablet TAKE 1 TO 2 TABLETS BY MOUTH AT NIGHT 9/26/22   TABITHA Hopson CNP   gabapentin (NEURONTIN) 100 MG capsule TAKE 1 CAPSULE BY MOUTH EVERY MORNING, 1 CAPSULE AT NOON AND 1 CAPSULE BEFORE BEDTIME 8/29/22 9/28/22  TABITHA Hopson CNP   lisinopril (PRINIVIL;ZESTRIL) 5 MG tablet Take 1 tablet by mouth in the morning. 7/25/22   TABITHA Hopson CNP   clotrimazole-betamethasone (LOTRISONE) 1-0.05 % cream APPLY TOPICALLY TO AFFECTED AREAS every morning and BEFORE BEDTIME 6/28/22   Historical Provider, MD   fluticasone (FLONASE) 50 MCG/ACT nasal spray INSTILL 1 SPRAY BY EACH NOSTRIL ROUTE DAILY 7/18/22   TABITHA Hopson CNP   naproxen (NAPROSYN) 500 MG tablet Take 1 tablet by mouth 2 times daily (with meals) 5/24/22   TABITHA Hopson CNP   naloxone 4 MG/0.1ML LIQD nasal spray 1 spray by Nasal route as needed 3/29/22   Historical Provider, MD   Prenatal Vit-Fe Fumarate-FA (PRENATAL VITAMINS) 28-0.8 MG TABS Take 1 tablet by mouth daily 3/24/22   TABITHA Hopson CNP   vitamin D (CHOLECALCIFEROL) 25 MCG (1000 UT) TABS tablet Take 1 tablet by mouth daily 3/24/22 6/22/22  TABITHA Hopson CNP   Nutritional Supplements (NUTRITIONAL SHAKE HIGH PROTEIN) LIQD Take 1 Units by mouth daily 3/24/22   TABITHA Hopson CNP   aspirin 81 MG EC tablet Take 1 tablet by mouth daily 11/10/21   Historical Provider, MD   buprenorphine-naloxone (SUBOXONE) 8-2 MG FILM SL film  12/12/20   Historical Provider, MD       REVIEW OF SYSTEMS    (2-9 systems for level 4, 10 or more for level 5)      Review of Systems   Constitutional:  Negative for chills and positive for fever. Positive for body aches  HENT:  Negative for congestion and rhinorrhea. Eyes:  Negative for photophobia and visual disturbance. Respiratory:  Positive for cough and shortness of breath  Cardiovascular:  Negative for chest pain and palpitations. Gastrointestinal:  Negative for abdominal pain, positive for nausea and vomiting. Genitourinary:  Negative for dysuria and frequency. Musculoskeletal:  Negative for back pain and neck pain. Skin:  Negative for rash and wound. Neurological:  Negative for dizziness and headaches. PHYSICAL EXAM   (up to 7 for level 4, 8 or more for level 5)      INITIAL VITALS:   /75   Pulse 99   Temp (!) 100.8 °F (38.2 °C) (Oral)   Resp 18   Ht 5' 8\" (1.727 m)   Wt 160 lb (72.6 kg)   SpO2 92%   BMI 24.33 kg/m²     Physical Exam  Vitals and nursing note reviewed. Constitutional:       General: She is visibly short of breath but speaking in full sentences  HENT:      Head: Atraumatic. Right Ear: External ear normal.      Left Ear: External ear normal.      Nose: Nose normal.      Mouth/Throat:      Mouth: Mucous membranes are moist.      Pharynx: Oropharynx is clear.    Eyes:      Conjunctiva/sclera: Conjunctivae normal.   Cardiovascular: Rate and Rhythm: Normal rate and regular rhythm. Pulses: Normal pulses. 1+ edema in bilateral lower extremities, symmetric  Pulmonary:      Effort: Tachypneic without stridor, speaking in full sentences     Breath sounds: Normal breath sounds. No wheezing. Abdominal:      Palpations: Abdomen is soft. Tenderness: There is no abdominal tenderness. Musculoskeletal:         General: Normal range of motion. Cervical back: Normal range of motion. Skin:     General: Skin is warm and dry. Capillary Refill: Capillary refill takes less than 2 seconds. Neurological:      General: No focal deficit present. Mental Status: He is alert and oriented to person, place, and time.      DIFFERENTIAL  DIAGNOSIS     PLAN (LABS / IMAGING / EKG):  Orders Placed This Encounter   Procedures    Culture, Blood 1    Culture, Blood 1    Culture, Urine    COVID-19, Rapid    XR CHEST PORTABLE    CT CHEST PULMONARY EMBOLISM W CONTRAST    CBC with Auto Differential    Comprehensive Metabolic Panel    Lactate, Sepsis    Protime-INR    APTT    Troponin    Urinalysis with Microscopic    Lactic Acid    HCG Qualitative, Serum    Brain Natriuretic Peptide    SODIUM (POC)    POTASSIUM (POC)    CHLORIDE (POC)    CALCIUM, IONIC (POC)    Continuous Pulse Oximetry    Venous Blood Gas, POC    Anion Gap (Calc) POC    EKG 12 Lead       MEDICATIONS ORDERED:  Orders Placed This Encounter   Medications    acetaminophen (TYLENOL) tablet 1,000 mg       DDX: PE, pneumonia, COVID, viral URI, CHF exacerbation, COPD    DIAGNOSTIC RESULTS / EMERGENCY DEPARTMENT COURSE / MDM   LAB RESULTS:  Results for orders placed or performed during the hospital encounter of 09/28/22   SODIUM (POC)   Result Value Ref Range    POC Sodium 136 (L) 138 - 146 mmol/L   POTASSIUM (POC)   Result Value Ref Range    POC Potassium 3.2 (L) 3.5 - 4.5 mmol/L   CHLORIDE (POC)   Result Value Ref Range    POC Chloride 99 98 - 107 mmol/L   CALCIUM, IONIC (POC)   Result Value Ref Range    POC Ionized Calcium 1.08 (L) 1.15 - 1.33 mmol/L   Venous Blood Gas, POC   Result Value Ref Range    pH, Vipul 7.402 7.320 - 7.430    pCO2, Vipul 40.1 (L) 41.0 - 51.0 mm Hg    pO2, Vipul 30.4 30.0 - 50.0 mm Hg    HCO3, Venous 24.9 22.0 - 29.0 mmol/L    Positive Base Excess, Vipul 0 0.0 - 3.0    O2 Sat, Vipul 58 (L) 60.0 - 85.0 %   Anion Gap (Calc) POC   Result Value Ref Range    Anion Gap 12 7 - 16 mmol/L       IMPRESSION: PE versus pneumonia versus COVID    RADIOLOGY:  XR CHEST PORTABLE    (Results Pending)   CT CHEST PULMONARY EMBOLISM W CONTRAST    (Results Pending)         EKG  Pending at signout    All EKG's are interpreted by the Emergency Department Physician who either signs or Co-signs this chart in the absence of a cardiologist.    EMERGENCY DEPARTMENT COURSE:  51-year-old female presented to ED with complaints of URI symptoms associated with nausea, vomiting and shortness of breath over the past 3 days. No chest pain. On exam, febrile at 100.8, borderline tachycardic at 99, initially tachypneic at 34, clear lung sounds, 1+ edema to bilateral lower extremities that are symmetric. Plan to get septic work-up, cardiac work-up, CT PE, antipyretic. Patient not given fluids immediately due to concern for fluid overload due to edema in lower extremities. Patient was placed on nonrebreather due to satting in mid 80s on room air. Signed out to  at [phone] pending lab results, ekg. Likely admit.      Sepsis Times and Checklist  Vital Signs: BP: 124/75  Heart Rate: 99  Resp: 18  Temp: (!) 100.8 °F (38.2 °C) SpO2: 92 %  SIRS (>2) Non Pregnant       Temp > 38.3C or < 36C   HR > 90   RR > 20   WBC > 12 or < 4 or >10% bands  SIRS (>2) Pregnant 20 weeks until 3 days postpartum   Temp > 38C or <36C   HR >110   RR > 24   WBC >15 or < 4 or >10% bands   SIRS (>2) and confirmed or suspected source of infection = Sepsis  Is Sepsis due to likely bacterial infection?: Yes  If not, then sepsis is due to likely viral etiology. Sepsis Identified:  Date: 9/28/22   Time: 1743  Sepsis Orders:   CBC(required): Yes   CMP: Yes   PT/PTT: Yes   Blood Cultures x2(required): Yes   Urinalysis and Urine Culture: Yes   Lactate(required): Yes   Antibiotics Given (within 3 hours of sepsis identification, after blood cultures):  pending at signout    (If unable to obtain IV access for IV antibiotics within 3 hours of identification of sepsis, IM antibiotics is acceptable.)              If lactate >2.0 MUST repeat within 6 hours    If elevated, is elevated lactate from a likely infectious source?: Pending at signout    Rest of septic checklist pending at signout    No notes of EC Admission Criteria type on file. PROCEDURES:  None    CONSULTS:  None    FINAL IMPRESSION      1. Tachypnea          DISPOSITION / PLAN     DISPOSITION        PATIENT REFERRED TO:  No follow-up provider specified.     DISCHARGE MEDICATIONS:  New Prescriptions    No medications on file       Arlene Lyon MD  Emergency Medicine Resident    (Please note that portions of thisnote were completed with a voice recognition program.  Efforts were made to edit the dictations but occasionally words are mis-transcribed.)        Eligio Moody MD  Resident  09/28/22 4882

## 2022-09-28 NOTE — ED PROVIDER NOTES
Obviously swabs for COVID and flu as well. Treat symptomatically and reevaluate after with clearly admission needed    COVID test is negative and chest x-ray as well as CT are negative for DVT or dissection however significant for very dense bilateral lower lobe pneumonia worse on the right. IV antibiotics started. VBG shows no PCO2 retention and patient doing better on nonrebreather in regards to saturations. VBG returned with no obvious PCO2 retention but patient beginning to desaturate more into the mid 80%s. Placed on BiPAP. Patient on BiPAP and appears to be tiring and still unfortunately hypoxic at 85%. After discussion with patient and family will prepare to intubate. Preoxygenation with nasal cannula as well as nonrebreather obtaining 94% saturation. RSI was given etomidate and succinylcholine. Patient was intubated successfully on first attempt with no desaturation. Good breath sounds bilaterally with nothing over the epigastrium and good fogging of the tube. End-tidal CO2 color change past 5 breaths. Awaiting stat portable chest x-ray as well for placement. Paging MICU for admission        EKG Interpretation    Interpreted by emergency department physician    Rhythm: normal sinus   Rate: normal at 90 bpm  Axis: normal  Conduction: normal  ST Segments: no acute change  T Waves: no acute change  Q Waves: no acute change    Clinical Impression:  nonspecific EKG. CRITICAL CARE: There was a high probability of clinically significant/life threatening deterioration in this patient's condition which required my urgent intervention. Total critical care time was 30 minutes. This excludes any time for separately reportable procedures. (Please note that portions of this note were completed with a voice recognition program. Efforts were made to edit the dictations but occasionally words are mis-transcribed.  Whenever words are used in this note in any gender, they shall be construed as though they were used in the gender appropriate to the circumstances; and whenever words are used in this note in the singular or plural form, they shall be construed as though they were used in the form appropriate to the circumstances.)    MD Lindsay Roberto  Attending Emergency Medicine Physician            Jesu Nogueira MD  09/28/22 1740       Jesu Nogueira MD  09/28/22 1745       Jesu Nogueira MD  09/28/22 2051       Jesu Nogueira MD  09/28/22 2148       Jesu Nogueira MD  09/28/22 2251

## 2022-09-28 NOTE — ED NOTES
Writer taking over for patient. Pt is alert and oriented. Pt has 90% saturation on 15LNRB.       Parker Segundo RN  09/28/22 0439

## 2022-09-28 NOTE — ED PROVIDER NOTES
[address] [facility]  Emergency Department  Emergency Medicine Resident Sign-out     Care of Baldev Grace was assumed from Dr. Carlos A Trinidad and is being seen for Shortness of Breath  . The patient's initial evaluation and plan have been discussed with the prior provider who initially evaluated the patient.      EMERGENCY DEPARTMENT COURSE / MEDICAL DECISION MAKING:       MEDICATIONS GIVEN:  Orders Placed This Encounter   Medications    acetaminophen (TYLENOL) tablet 1,000 mg    iopamidol (ISOVUE-370) 76 % injection 75 mL    cefTRIAXone (ROCEPHIN) 1,000 mg in sodium chloride 0.9 % 50 mL IVPB mini-bag     Order Specific Question:   Antimicrobial Indications     Answer:   Pneumonia (CAP)    azithromycin (ZITHROMAX) 500 mg in dextrose 5% 250 mL IVPB     Order Specific Question:   Antimicrobial Indications     Answer:   Pneumonia (CAP)    propofol 1000 MG/100ML injection     LAURO GLORIA: luis override       LABS / RADIOLOGY:     Labs Reviewed   CBC WITH AUTO DIFFERENTIAL - Abnormal; Notable for the following components:       Result Value    WBC 13.8 (*)     RBC 3.90 (*)     Hemoglobin 11.8 (*)     Hematocrit 35.4 (*)     Immature Granulocytes 2 (*)     Seg Neutrophils 86 (*)     Lymphocytes 6 (*)     Eosinophils % 0 (*)     Segs Absolute 11.86 (*)     Absolute Lymph # 0.83 (*)     Absolute Mono # 0.83 (*)     All other components within normal limits   COMPREHENSIVE METABOLIC PANEL - Abnormal; Notable for the following components:    Glucose 112 (*)     Calcium 8.3 (*)     Sodium 131 (*)     Potassium 3.3 (*)     All other components within normal limits   PROTIME-INR - Abnormal; Notable for the following components:    Protime 12.7 (*)     All other components within normal limits   APTT - Abnormal; Notable for the following components:    PTT 32.1 (*)     All other components within normal limits   SODIUM (POC) - Abnormal; Notable for the following components:    POC Sodium 136 (*)     All other components within normal limits   POTASSIUM (POC) - Abnormal; Notable for the following components:    POC Potassium 3.2 (*)     All other components within normal limits   CALCIUM, IONIC (POC) - Abnormal; Notable for the following components:    POC Ionized Calcium 1.08 (*)     All other components within normal limits   BLOOD GAS, VENOUS - Abnormal; Notable for the following components:    pCO2, Vipul 38.0 (*)     HCO3, Venous 23.9 (*)     All other components within normal limits   VENOUS BLOOD GAS, POINT OF CARE - Abnormal; Notable for the following components:    pCO2, Vipul 40.1 (*)     O2 Sat, Vipul 58 (*)     All other components within normal limits   CULTURE, BLOOD 1   CULTURE, BLOOD 1   COVID-19, RAPID   CULTURE, URINE   LACTATE, SEPSIS   TROPONIN   TROPONIN   LACTIC ACID   HCG, SERUM, QUALITATIVE   BRAIN NATRIURETIC PEPTIDE   CHLORIDE (POC)   URINALYSIS WITH MICROSCOPIC   ANION GAP (CALC) POC       No results found. RECENT VITALS:     Temp: 98.1 °F (36.7 °C),  Heart Rate: 73, Resp: 19, BP: 109/72, SpO2: 96 %      This patient is a 39 y.o. Female with complaints of URI symptoms over the past 3 days associated with worsening shortness of breath. Patient has history of smoking, is COVID vaccinated. No known sick contacts. On exam, patient satting in mid [de-identified] and was placed on nonrebreather with improvement and is currently 93% on nonrebreather. Patient also febrile, was initially tachypneic that is now improved, lungs were clear and patient talking in full sentences, does have 1+ edema to bilateral lower extremities and no unilateral leg swelling noted. Patient does have history of blood clot in the past is not on any blood thinners and was while she was pregnant. Plan to get septic work-up, CT PE, cardiac work-up. Held off on giving fluids initially due to concern for fluid overload. No prior history of CHF.       ED Course as of 09/28/22 2232   Wed Sep 28, 2022   1746 Epo VBG showed pH 7.402, pCO2 40.1 [AR]   0256 Signed out to  at 1745 [AR]   2143 Patient found to have significant bilateral lower lobe pneumonia. Did receive broad-spectrum antibiotics within 3 hours of diagnosis of sepsis. CTA was negative for pulmonary emboli. Admitted to stepdown. Patient did desaturate to 89% 100% nonrebreather. No CO2 retention on VBG. RT consulted for BiPAP. [GG]   9800 Patient became more hypoxic and tachypneic. Attempted use of BiPAP to improve oxygenation, patient remained in the high 80s with saturation and respiratory rate in the 30s. Decision was made to intubate patient. Patient was successfully intubated via RSI. Will change disposition to ICU. [GG]      ED Course User Index  [AR] Lynn Soriano MD  [GG] Jennifer Fagan MD       OUTSTANDING TASKS / RECOMMENDATIONS:    F/u labs and CT     FINAL IMPRESSION:     1. Tachypnea    2. Pneumonia of both lower lobes due to infectious organism    3. Sepsis, due to unspecified organism, unspecified whether acute organ dysfunction present ([facility] [address]    4. Acute respiratory failure with hypoxia (HCC)        DISPOSITION:         DISPOSITION:  []  Discharge   []  Transfer -    [x]  Admission -  stepdown   []  Against Medical Advice   []  Eloped   FOLLOW-UP: No follow-up provider specified.    DISCHARGE MEDICATIONS: New Prescriptions    No medications on file          Jennifer Fagan MD  Emergency Medicine Resident  [facility]        [employer], [address] [place] [address]  Resident  09/28/22 4440       Jennifer Fagan MD  Resident  09/28/22 4741

## 2022-09-28 NOTE — ED NOTES
Pt states she has been sick the past few days. Patient is c/o of SOB. Patient does state she has history of blood clots and PE. Patient states her legs have been swelling. Pt respirations are even and unlabored, pt is oriented X 4, speaking in complete sentences, bed is in the lowest position, call light is within reach. Will continue to monitor.        Connie Estrada RN  09/28/22 1440

## 2022-09-29 ENCOUNTER — APPOINTMENT (OUTPATIENT)
Dept: GENERAL RADIOLOGY | Age: 42
DRG: 720 | End: 2022-09-29
Payer: COMMERCIAL

## 2022-09-29 LAB
ABSOLUTE EOS #: 0 K/UL (ref 0–0.4)
ABSOLUTE IMMATURE GRANULOCYTE: 0.12 K/UL (ref 0–0.3)
ABSOLUTE LYMPH #: 0.98 K/UL (ref 1–4.8)
ABSOLUTE MONO #: 0.37 K/UL (ref 0.1–0.8)
ALLEN TEST: POSITIVE
ALLEN TEST: POSITIVE
ANION GAP SERPL CALCULATED.3IONS-SCNC: 9 MMOL/L (ref 9–17)
BASOPHILS # BLD: 1 % (ref 0–2)
BASOPHILS ABSOLUTE: 0.12 K/UL (ref 0–0.2)
BUN BLDV-MCNC: 9 MG/DL (ref 6–20)
CALCIUM SERPL-MCNC: 7.8 MG/DL (ref 8.6–10.4)
CHLORIDE BLD-SCNC: 103 MMOL/L (ref 98–107)
CO2: 22 MMOL/L (ref 20–31)
CREAT SERPL-MCNC: 0.39 MG/DL (ref 0.5–0.9)
CULTURE: NORMAL
DIRECT EXAM: NORMAL
EKG ATRIAL RATE: 90 BPM
EKG P AXIS: 60 DEGREES
EKG P-R INTERVAL: 140 MS
EKG Q-T INTERVAL: 354 MS
EKG QRS DURATION: 82 MS
EKG QTC CALCULATION (BAZETT): 433 MS
EKG R AXIS: 52 DEGREES
EKG T AXIS: 69 DEGREES
EKG VENTRICULAR RATE: 90 BPM
EOSINOPHILS RELATIVE PERCENT: 0 % (ref 1–4)
FIO2: 100
FIO2: 100
GFR AFRICAN AMERICAN: >60 ML/MIN
GFR NON-AFRICAN AMERICAN: >60 ML/MIN
GFR SERPL CREATININE-BSD FRML MDRD: ABNORMAL ML/MIN/{1.73_M2}
GLUCOSE BLD-MCNC: 87 MG/DL (ref 74–100)
GLUCOSE BLD-MCNC: 93 MG/DL (ref 70–99)
GLUCOSE BLD-MCNC: 97 MG/DL (ref 74–100)
HCT VFR BLD CALC: 31.3 % (ref 36.3–47.1)
HEMOGLOBIN: 10.4 G/DL (ref 11.9–15.1)
IMMATURE GRANULOCYTES: 1 %
INR BLD: 1.3
LACTIC ACID, SEPSIS WHOLE BLOOD: 2 MMOL/L (ref 0.5–1.9)
LACTIC ACID, SEPSIS WHOLE BLOOD: 4.2 MMOL/L (ref 0.5–1.9)
LYMPHOCYTES # BLD: 8 % (ref 24–44)
MCH RBC QN AUTO: 30.7 PG (ref 25.2–33.5)
MCHC RBC AUTO-ENTMCNC: 33.2 G/DL (ref 28.4–34.8)
MCV RBC AUTO: 92.3 FL (ref 82.6–102.9)
MODE: ABNORMAL
MODE: ABNORMAL
MONOCYTES # BLD: 3 % (ref 1–7)
MORPHOLOGY: NORMAL
MRSA, DNA, NASAL: NEGATIVE
NRBC AUTOMATED: 0 PER 100 WBC
O2 DEVICE/FLOW/%: ABNORMAL
O2 DEVICE/FLOW/%: ABNORMAL
PDW BLD-RTO: 14.2 % (ref 11.8–14.4)
PLATELET # BLD: ABNORMAL K/UL (ref 138–453)
PLATELET, FLUORESCENCE: 127 K/UL (ref 138–453)
PLATELET, IMMATURE FRACTION: 6.3 % (ref 1.1–10.3)
POC HCO3: 24.2 MMOL/L (ref 21–28)
POC HCO3: 25 MMOL/L (ref 21–28)
POC O2 SATURATION: 95 % (ref 94–98)
POC O2 SATURATION: 99 % (ref 94–98)
POC PCO2: 35.6 MM HG (ref 35–48)
POC PCO2: 42.1 MM HG (ref 35–48)
POC PH: 7.38 (ref 7.35–7.45)
POC PH: 7.44 (ref 7.35–7.45)
POC PO2: 121.2 MM HG (ref 83–108)
POC PO2: 79.2 MM HG (ref 83–108)
POSITIVE BASE EXCESS, ART: 0 (ref 0–3)
POSITIVE BASE EXCESS, ART: 0 (ref 0–3)
POTASSIUM SERPL-SCNC: 3.7 MMOL/L (ref 3.7–5.3)
PROTHROMBIN TIME: 12.7 SEC (ref 9.1–12.3)
RBC # BLD: 3.39 M/UL (ref 3.95–5.11)
SAMPLE SITE: ABNORMAL
SAMPLE SITE: ABNORMAL
SEG NEUTROPHILS: 87 % (ref 36–66)
SEGMENTED NEUTROPHILS ABSOLUTE COUNT: 10.71 K/UL (ref 1.8–7.7)
SODIUM BLD-SCNC: 134 MMOL/L (ref 135–144)
SPECIMEN DESCRIPTION: NORMAL
WBC # BLD: 12.3 K/UL (ref 3.5–11.3)

## 2022-09-29 PROCEDURE — 85025 COMPLETE CBC W/AUTO DIFF WBC: CPT

## 2022-09-29 PROCEDURE — 80048 BASIC METABOLIC PNL TOTAL CA: CPT

## 2022-09-29 PROCEDURE — 82947 ASSAY GLUCOSE BLOOD QUANT: CPT

## 2022-09-29 PROCEDURE — 83605 ASSAY OF LACTIC ACID: CPT

## 2022-09-29 PROCEDURE — 82803 BLOOD GASES ANY COMBINATION: CPT

## 2022-09-29 PROCEDURE — 71045 X-RAY EXAM CHEST 1 VIEW: CPT

## 2022-09-29 PROCEDURE — 87205 SMEAR GRAM STAIN: CPT

## 2022-09-29 PROCEDURE — 85610 PROTHROMBIN TIME: CPT

## 2022-09-29 PROCEDURE — 85055 RETICULATED PLATELET ASSAY: CPT

## 2022-09-29 PROCEDURE — 2580000003 HC RX 258: Performed by: STUDENT IN AN ORGANIZED HEALTH CARE EDUCATION/TRAINING PROGRAM

## 2022-09-29 PROCEDURE — 2500000003 HC RX 250 WO HCPCS: Performed by: NURSE PRACTITIONER

## 2022-09-29 PROCEDURE — 99291 CRITICAL CARE FIRST HOUR: CPT | Performed by: INTERNAL MEDICINE

## 2022-09-29 PROCEDURE — 94002 VENT MGMT INPAT INIT DAY: CPT

## 2022-09-29 PROCEDURE — 2580000003 HC RX 258: Performed by: NURSE PRACTITIONER

## 2022-09-29 PROCEDURE — 94003 VENT MGMT INPAT SUBQ DAY: CPT

## 2022-09-29 PROCEDURE — 51702 INSERT TEMP BLADDER CATH: CPT

## 2022-09-29 PROCEDURE — 6370000000 HC RX 637 (ALT 250 FOR IP): Performed by: STUDENT IN AN ORGANIZED HEALTH CARE EDUCATION/TRAINING PROGRAM

## 2022-09-29 PROCEDURE — 2500000003 HC RX 250 WO HCPCS: Performed by: STUDENT IN AN ORGANIZED HEALTH CARE EDUCATION/TRAINING PROGRAM

## 2022-09-29 PROCEDURE — 93010 ELECTROCARDIOGRAM REPORT: CPT | Performed by: INTERNAL MEDICINE

## 2022-09-29 PROCEDURE — 89220 SPUTUM SPECIMEN COLLECTION: CPT

## 2022-09-29 PROCEDURE — 94761 N-INVAS EAR/PLS OXIMETRY MLT: CPT

## 2022-09-29 PROCEDURE — 87077 CULTURE AEROBIC IDENTIFY: CPT

## 2022-09-29 PROCEDURE — 74018 RADEX ABDOMEN 1 VIEW: CPT

## 2022-09-29 PROCEDURE — 87040 BLOOD CULTURE FOR BACTERIA: CPT

## 2022-09-29 PROCEDURE — 87641 MR-STAPH DNA AMP PROBE: CPT

## 2022-09-29 PROCEDURE — 2700000000 HC OXYGEN THERAPY PER DAY

## 2022-09-29 PROCEDURE — 36415 COLL VENOUS BLD VENIPUNCTURE: CPT

## 2022-09-29 PROCEDURE — 6360000002 HC RX W HCPCS: Performed by: STUDENT IN AN ORGANIZED HEALTH CARE EDUCATION/TRAINING PROGRAM

## 2022-09-29 PROCEDURE — 87070 CULTURE OTHR SPECIMN AEROBIC: CPT

## 2022-09-29 PROCEDURE — 36600 WITHDRAWAL OF ARTERIAL BLOOD: CPT

## 2022-09-29 PROCEDURE — 2000000000 HC ICU R&B

## 2022-09-29 PROCEDURE — 6360000002 HC RX W HCPCS: Performed by: NURSE PRACTITIONER

## 2022-09-29 PROCEDURE — 87186 SC STD MICRODIL/AGAR DIL: CPT

## 2022-09-29 PROCEDURE — 6370000000 HC RX 637 (ALT 250 FOR IP): Performed by: NURSE PRACTITIONER

## 2022-09-29 RX ORDER — LISINOPRIL 5 MG/1
5 TABLET ORAL DAILY
Status: DISCONTINUED | OUTPATIENT
Start: 2022-09-29 | End: 2022-10-04 | Stop reason: HOSPADM

## 2022-09-29 RX ORDER — SODIUM CHLORIDE 9 MG/ML
INJECTION, SOLUTION INTRAVENOUS PRN
Status: DISCONTINUED | OUTPATIENT
Start: 2022-09-29 | End: 2022-10-04 | Stop reason: HOSPADM

## 2022-09-29 RX ORDER — ASPIRIN 81 MG/1
81 TABLET ORAL DAILY
Status: DISCONTINUED | OUTPATIENT
Start: 2022-09-29 | End: 2022-10-04 | Stop reason: HOSPADM

## 2022-09-29 RX ORDER — ACETAMINOPHEN 650 MG/1
650 SUPPOSITORY RECTAL EVERY 6 HOURS PRN
Status: DISCONTINUED | OUTPATIENT
Start: 2022-09-29 | End: 2022-10-04 | Stop reason: HOSPADM

## 2022-09-29 RX ORDER — PROPOFOL 10 MG/ML
5-50 INJECTION, EMULSION INTRAVENOUS CONTINUOUS
Status: DISCONTINUED | OUTPATIENT
Start: 2022-09-29 | End: 2022-10-01

## 2022-09-29 RX ORDER — SODIUM CHLORIDE 0.9 % (FLUSH) 0.9 %
5-40 SYRINGE (ML) INJECTION PRN
Status: DISCONTINUED | OUTPATIENT
Start: 2022-09-29 | End: 2022-09-29

## 2022-09-29 RX ORDER — ENOXAPARIN SODIUM 100 MG/ML
40 INJECTION SUBCUTANEOUS DAILY
Status: DISCONTINUED | OUTPATIENT
Start: 2022-09-29 | End: 2022-10-04 | Stop reason: HOSPADM

## 2022-09-29 RX ORDER — SODIUM CHLORIDE 0.9 % (FLUSH) 0.9 %
5-40 SYRINGE (ML) INJECTION EVERY 12 HOURS SCHEDULED
Status: DISCONTINUED | OUTPATIENT
Start: 2022-09-29 | End: 2022-09-29

## 2022-09-29 RX ORDER — SODIUM CHLORIDE 9 MG/ML
INJECTION, SOLUTION INTRAVENOUS CONTINUOUS
Status: DISCONTINUED | OUTPATIENT
Start: 2022-09-29 | End: 2022-09-29

## 2022-09-29 RX ORDER — GABAPENTIN 100 MG/1
100 CAPSULE ORAL 3 TIMES DAILY
Status: DISCONTINUED | OUTPATIENT
Start: 2022-09-29 | End: 2022-10-04 | Stop reason: HOSPADM

## 2022-09-29 RX ORDER — ACETAMINOPHEN 325 MG/1
650 TABLET ORAL EVERY 6 HOURS PRN
Status: DISCONTINUED | OUTPATIENT
Start: 2022-09-29 | End: 2022-10-04 | Stop reason: HOSPADM

## 2022-09-29 RX ORDER — 0.9 % SODIUM CHLORIDE 0.9 %
1000 INTRAVENOUS SOLUTION INTRAVENOUS ONCE
Status: COMPLETED | OUTPATIENT
Start: 2022-09-29 | End: 2022-09-29

## 2022-09-29 RX ADMIN — ACETAMINOPHEN 650 MG: 325 TABLET ORAL at 16:38

## 2022-09-29 RX ADMIN — GABAPENTIN 100 MG: 100 CAPSULE ORAL at 15:20

## 2022-09-29 RX ADMIN — Medication 81 MG: at 08:18

## 2022-09-29 RX ADMIN — GABAPENTIN 100 MG: 100 CAPSULE ORAL at 21:06

## 2022-09-29 RX ADMIN — DOCUSATE SODIUM 50 MG AND SENNOSIDES 8.6 MG 1 TABLET: 8.6; 5 TABLET, FILM COATED ORAL at 19:42

## 2022-09-29 RX ADMIN — SODIUM CHLORIDE: 9 INJECTION, SOLUTION INTRAVENOUS at 14:53

## 2022-09-29 RX ADMIN — SODIUM CHLORIDE 1000 ML: 9 INJECTION, SOLUTION INTRAVENOUS at 05:48

## 2022-09-29 RX ADMIN — SODIUM CHLORIDE, PRESERVATIVE FREE 20 MG: 5 INJECTION INTRAVENOUS at 08:18

## 2022-09-29 RX ADMIN — AZITHROMYCIN DIHYDRATE 500 MG: 500 INJECTION, POWDER, LYOPHILIZED, FOR SOLUTION INTRAVENOUS at 19:40

## 2022-09-29 RX ADMIN — SODIUM CHLORIDE, PRESERVATIVE FREE 10 ML: 5 INJECTION INTRAVENOUS at 02:31

## 2022-09-29 RX ADMIN — DOCUSATE SODIUM 50 MG AND SENNOSIDES 8.6 MG 1 TABLET: 8.6; 5 TABLET, FILM COATED ORAL at 08:18

## 2022-09-29 RX ADMIN — ACETAMINOPHEN 650 MG: 325 TABLET ORAL at 08:23

## 2022-09-29 RX ADMIN — ENOXAPARIN SODIUM 40 MG: 100 INJECTION SUBCUTANEOUS at 08:18

## 2022-09-29 RX ADMIN — SODIUM CHLORIDE: 9 INJECTION, SOLUTION INTRAVENOUS at 06:51

## 2022-09-29 RX ADMIN — Medication 50 MCG/HR: at 01:25

## 2022-09-29 RX ADMIN — PROPOFOL 30 MCG/KG/MIN: 10 INJECTION, EMULSION INTRAVENOUS at 01:21

## 2022-09-29 RX ADMIN — SODIUM CHLORIDE: 9 INJECTION, SOLUTION INTRAVENOUS at 04:20

## 2022-09-29 RX ADMIN — PROPOFOL 30 MCG/KG/MIN: 10 INJECTION, EMULSION INTRAVENOUS at 04:13

## 2022-09-29 RX ADMIN — SODIUM CHLORIDE, PRESERVATIVE FREE 20 MG: 5 INJECTION INTRAVENOUS at 19:43

## 2022-09-29 RX ADMIN — PROPOFOL 40 MCG/KG/MIN: 10 INJECTION, EMULSION INTRAVENOUS at 09:18

## 2022-09-29 RX ADMIN — ACETAMINOPHEN 650 MG: 325 TABLET ORAL at 03:16

## 2022-09-29 RX ADMIN — CEFTRIAXONE SODIUM 1000 MG: 10 INJECTION, POWDER, FOR SOLUTION INTRAVENOUS at 19:38

## 2022-09-29 RX ADMIN — PROPOFOL 50 MCG/KG/MIN: 10 INJECTION, EMULSION INTRAVENOUS at 18:48

## 2022-09-29 RX ADMIN — GABAPENTIN 100 MG: 100 CAPSULE ORAL at 08:18

## 2022-09-29 RX ADMIN — Medication 75 MCG/HR: at 14:56

## 2022-09-29 RX ADMIN — SODIUM CHLORIDE, PRESERVATIVE FREE 20 MG: 5 INJECTION INTRAVENOUS at 02:29

## 2022-09-29 RX ADMIN — SODIUM CHLORIDE, PRESERVATIVE FREE 10 ML: 5 INJECTION INTRAVENOUS at 19:43

## 2022-09-29 RX ADMIN — PROPOFOL 40 MCG/KG/MIN: 10 INJECTION, EMULSION INTRAVENOUS at 15:15

## 2022-09-29 ASSESSMENT — PULMONARY FUNCTION TESTS
PIF_VALUE: 17
PIF_VALUE: 21
PIF_VALUE: 14
PIF_VALUE: 20
PIF_VALUE: 13
PIF_VALUE: 20
PIF_VALUE: 14
PIF_VALUE: 23
PIF_VALUE: 25
PIF_VALUE: 21
PIF_VALUE: 15
PIF_VALUE: 20
PIF_VALUE: 19
PIF_VALUE: 19
PIF_VALUE: 23
PIF_VALUE: 20
PIF_VALUE: 19
PIF_VALUE: 24
PIF_VALUE: 19
PIF_VALUE: 19
PIF_VALUE: 24
PIF_VALUE: 22
PIF_VALUE: 20
PIF_VALUE: 22
PIF_VALUE: 22
PIF_VALUE: 21
PIF_VALUE: 17
PIF_VALUE: 23
PIF_VALUE: 21

## 2022-09-29 NOTE — PROCEDURES
PROCEDURE NOTE - EMERGENCY INTUBATION    PATIENT NAME: Calvin Cruz RECORD NO. [id number]  DATE: 9/28/2022  ATTENDING PHYSICIAN: Dr. Deana Louie DIAGNOSIS:  Acute Respiratory Failure  POSTOPERATIVE DIAGNOSIS:  Same  PROCEDURE PERFORMED:  Emergency endotracheal intubation  PERFORMING PHYSICIAN: Bernarda Ortez MD    MEDICATIONS: etomidate 20 mg intravenously and succinycholine 100 mg intravenously    DISCUSSION:  Catherine Kemp is a 39y.o.-year-old female who requires intubation and ventilatory support due to Respiratory failure. The history and physical examination were reviewed and confirmed. CONSENT: The patient provided verbal consent for this procedure. PROCEDURE:  A timeout was initiated by the bedside nurse and was confirmed by those present. The patient was placed in the appropriate position. Cricoid pressure was utilized. Intubation was performed by direct laryngoscopy using a laryngoscope and a 7.5 cuffed endotracheal tube. The cuff was then inflated and the tube was secured appropriately at a distance of 24 cm to the dental ridge. Initial confirmation of placement included bilateral breath sounds, an end tidal CO2 detector, absence of sounds over the stomach, tube fogging, adequate chest rise, and adequate pulse oximetry reading. A chest x-ray to verify correct placement of the tube showed the tube needed advancing and the tube was repositioned accordingly. The patient tolerated the procedure well.      COMPLICATIONS:  None     Bernarda Ortez MD  10:33 PM, 9/28/22

## 2022-09-29 NOTE — PROGRESS NOTES
SPIRITUAL CARE DEPARTMENT - [facility]  PRO[facility]ESS [facility]    Shift date: 09/28/22  Shift day: Wednesday   Shift # 2    Room # 14/14   Name: Taty Preciado                Gnosticism:    Place of Rastafari:     Referral:  Nurse    Admit Date & Time: 9/28/2022  5:16 PM    Assessment:  Taty Preciado is a 39 y.o. female     Intervention:  Writer introduced self and title as  to spouse who was waiting in the ED waiting area. Spouse appeared to welcome  presence and engaged in conversation about his spouse's health and its impact. Writer offered space for spouse  to express feelings, needs, and concerns and provided a ministry presence.  offered hospitality which was accepted by spouse. Outcome:  Spouse appeared calm and coping and expressed gratitude for  visit. Plan:  Chaplains will remain available to offer spiritual and emotional support as needed.       Electronically signed by Silva Melissa, on 9/28/2022 at 10:31 PM.  Spencer Foster  [phone]

## 2022-09-29 NOTE — CARE COORDINATION
09/29/22 1451   Service Assessment   Patient Orientation Unable to Assess; Sedated   Cognition Other (see comment)  (unable to assess)   History Provided By Significant Other   Primary Caregiver Self   Accompanied By/Relationship Significant other- Spouse? R Carrillo [address] 20 Spouse/Significant Other;Parent   PCP Verified by CM   (unsure of PCP)   Prior Functional Level Independent in ADLs/IADLs   Current Functional Level   (Intubated/ Sedated)   Can patient return to prior living arrangement Unknown at present   Ability to make needs known: [employer]  (pt's SO/Spouse? unsure of pt's insurance)   Social/Functional History   Lives With Parent   Type of [address] One level   [address] to enter with rails   Entrance Stairs - Number of Steps 4   Entrance Stairs - Rails Both   Mode of Transportation Car   Occupation Unemployed   Discharge [address] Medications No   Patient expects to be discharged to: [address] Discharge   [address] Provided? No   Mode of Transport at Discharge Other (see comment)  (family will transport)   Condition of Participation: Discharge Planning   The Plan for Transition of Care is related to the following treatment goals: Intubated/ Sedated FiO2 50%, PEEP 10, PT/OT ordered, plans are to return home, has transportation- unsure of current PCP   The Patient and/or Patient Representative was provided with a Choice of Provider? Patient Representative   Name of the Patient Representative who was provided with the Choice of Provider and agrees with the Discharge Plan? Casie Araujo   The Patient and/Or Patient Representative agree with the Discharge Plan?  Yes   Freedom of Choice list was provided with basic dialogue that supports the patient's individualized plan of care/goals, treatment preferences, and shares the quality data associated with the providers?    (Will provided list if appropriate)   Pt's SO/ Spouse? Verified demographics.  He is unsure of pt's current PCP or insurance

## 2022-09-29 NOTE — PROGRESS NOTES
[facility])  Occupational Therapy Not Seen Note    DATE: 2022    NAME: Collette Harrier  MRN: [id number]   : 1980      Patient not seen this date for Occupational Therapy due to:    Patient is not appropriate for active participation in OT evaluation/treatment at this time d/t intubated/sedated/SBR    Next Scheduled Treatment: check back 2022    Electronically signed by TAHIRA Kaba/L on 2022 at 10:40 AM

## 2022-09-29 NOTE — PROGRESS NOTES
Physical Therapy Cancel Note      DATE: 2022    NAME: Chelsea Rowe  MRN: [id number]   : 1980      Patient not seen this date for Physical Therapy due to:    Strict Bedrest: and per RN, pt having respiratory issues; check status       Electronically signed by Ashleigh Ann PT on 2022 at 1:35 PM

## 2022-09-29 NOTE — PLAN OF CARE
Problem: Respiratory - Adult  Goal: Achieves optimal ventilation and oxygenation  9/29/2022 0502 by Iva Ulloa RN  Outcome: Progressing  9/29/2022 0450 by Megan Hilton RCP  Flowsheets (Taken 9/29/2022 9854)  Achieves optimal ventilation and oxygenation:   Assess for changes in respiratory status   Position to facilitate oxygenation and minimize respiratory effort   Assess the need for suctioning and aspirate as needed   Respiratory therapy support as indicated   Assess for changes in mentation and behavior   Oxygen supplementation based on oxygen saturation or arterial blood gases     Problem: Safety - Medical Restraint  Goal: Remains free of injury from restraints (Restraint for Interference with Medical Device)  Description: INTERVENTIONS:  1. Determine that other, less restrictive measures have been tried or would not be effective before applying the restraint  2. Evaluate the patient's condition at the time of restraint application  3. Inform patient/family regarding the reason for restraint  4. Q2H: Monitor safety, psychosocial status, comfort, nutrition and hydration  Outcome: Progressing     Problem: Discharge Planning  Goal: Discharge to home or other facility with appropriate resources  Outcome: Progressing     Problem: Pain  Goal: Verbalizes/displays adequate comfort level or baseline comfort level  Outcome: Progressing     Problem: Skin/Tissue Integrity  Goal: Absence of new skin breakdown  Description: 1. Monitor for areas of redness and/or skin breakdown  2. Assess vascular access sites hourly  3. Every 4-6 hours minimum:  Change oxygen saturation probe site  4. Every 4-6 hours:  If on nasal continuous positive airway pressure, respiratory therapy assess nares and determine need for appliance change or resting period.   Outcome: Progressing     Problem: Safety - Adult  Goal: Free from fall injury  Outcome: Progressing     Problem: ABCDS Injury Assessment  Goal: Absence of physical injury  Outcome: Progressing

## 2022-09-29 NOTE — H&P
Critical Care - History and Physical Examination    Patient's name:  Sondra Jacomecamore PadProof Record Number: [id number]  Patient's account/billing number: [de-identified]  Patient's YOB: 1980  Age: 39 y.o. Date of Admission: 9/28/2022  5:16 PM  Reason of ICU admission:   Date of History and Physical Examination: 9/28/2022      Primary Care Physician: TABITAH Conner CNP  Attending Physician:    Code Status: Full Code    Chief complaint: Shortness of Breath     Reason for ICU admission:   Respiratory failure   Pneumonia     History Of Present Illness:   History was obtained from chart review. Trudy Atkins is a 39 y.o. PMH of opioid dependence on suboxone, HTN and anxiety who presented with 3 day compliant of shortness of breath. Patient had reported a fever at home, cough, congestion and body aches. Patient presented tachypnic with saturations in the low 80's initially. She was placed on NRB with improvement. Patient reportedly had history of DVT in the past that were provoked in pregnancy, not on any AC currently. CT PE was done which did not demonstrate any PE but was significant for severe lobar pneumonia in bilateral lower lobes and right middle lobe. COVID negative. Labs with mild hypokalemia, leukocytosis of 13.8 otherwise unremarkable. Started on azithromycin and rocephin for CAP. Patient had worsening respiratory status while in the emergency department and was intubated due to hypoxia. Currently intubated and sedated on propofol, versed and fentanyl. Hemodynamically stable.            Past Medical History:        Diagnosis Date    Anxiety 2/13/2015    Chronic midline low back pain with right-sided sciatica 5/24/2022    Degeneration of cervical intervertebral disc 12/21/2020    Depression 12/5/2012    Essential hypertension 12/21/2020    Gastro-esophageal reflux disease without esophagitis 12/21/2020    Lumbar radiculopathy, acute 2/27/2013    Opioid use, unspecified with other opioid-induced disorder (HCC)(Suboxone) 12/21/2020       Past Surgical History:        Procedure Laterality Date    TUBAL LIGATION  01/27/2022       Allergies: Allergies   Allergen Reactions    Other      seasonal         Home Medications:   Prior to Admission medications    Medication Sig Start Date End Date Taking? Authorizing Provider   tiZANidine (ZANAFLEX) 2 MG tablet TAKE 1 TO 2 TABLETS BY MOUTH AT NIGHT 9/26/22   TABITHA Hopson CNP   gabapentin (NEURONTIN) 100 MG capsule TAKE 1 CAPSULE BY MOUTH EVERY MORNING, 1 CAPSULE AT NOON AND 1 CAPSULE BEFORE BEDTIME 8/29/22 9/28/22  TABITHA Hopson CNP   lisinopril (PRINIVIL;ZESTRIL) 5 MG tablet Take 1 tablet by mouth in the morning. 7/25/22   TABITHA Hopson CNP   clotrimazole-betamethasone (LOTRISONE) 1-0.05 % cream APPLY TOPICALLY TO AFFECTED AREAS every morning and BEFORE BEDTIME 6/28/22   Historical Provider, MD   fluticasone (FLONASE) 50 MCG/ACT nasal spray INSTILL 1 SPRAY BY EACH NOSTRIL ROUTE DAILY 7/18/22   TABITHA Hopson CNP   naproxen (NAPROSYN) 500 MG tablet Take 1 tablet by mouth 2 times daily (with meals) 5/24/22   TABITHA Hopson CNP   naloxone 4 MG/0.1ML LIQD nasal spray 1 spray by Nasal route as needed 3/29/22   Historical Provider, MD   Prenatal Vit-Fe Fumarate-FA (PRENATAL VITAMINS) 28-0.8 MG TABS Take 1 tablet by mouth daily 3/24/22   TABITHA Hopson CNP   vitamin D (CHOLECALCIFEROL) 25 MCG (1000 UT) TABS tablet Take 1 tablet by mouth daily 3/24/22 6/22/22  TABITHA Hopson CNP   Nutritional Supplements (NUTRITIONAL SHAKE HIGH PROTEIN) LIQD Take 1 Units by mouth daily 3/24/22   TABITHA Hopson CNP   aspirin 81 MG EC tablet Take 1 tablet by mouth daily 11/10/21   Historical Provider, MD   buprenorphine-naloxone (SUBOXONE) 8-2 MG FILM SL film  12/12/20   Historical Provider, MD       Social History:   TOBACCO:   reports that she has quit smoking.  Her smoking use included cigarettes. She has never used smokeless tobacco.  ETOH:   reports that she does not currently use alcohol. DRUGS: hx of opioid dependence on suboxone   OCCUPATION:      Family History:       Problem Relation Age of Onset    Heart Disease Mother     Breast Cancer Mother 36    Diabetes Father     Breast Cancer Maternal Aunt 36           REVIEW OF SYSTEMS (ROS):  Pt intubated and sedated- unable to obtain       Physical Exam:    Vitals: BP (!) 83/59   Pulse 85   Temp 98.1 °F (36.7 °C) (Oral)   Resp 22   Ht 5' 8\" (1.727 m)   Wt 160 lb (72.6 kg)   SpO2 91%   BMI 24.33 kg/m²     Body weight:   Wt Readings from Last 3 Encounters:   09/28/22 160 lb (72.6 kg)   09/01/22 157 lb (71.2 kg)   07/25/22 157 lb (71.2 kg)       Body Mass Index : Body mass index is 24.33 kg/m². PHYSICAL EXAMINATION :  Constitutional: intubated, sedated but easily arousable   EENT: PERRLA, EOMI, sclera clear, anicteric, oropharynx clear, no lesions, neck supple with midline trachea. Neck: Supple, symmetrical, trachea midline, no adenopathy, thyroid symmetric, no jvd skin normal  Respiratory: intubated, rhonchi heard in bilateral lung bases, no wheezing   Cardiovascular: regular rate and rhythm, normal S1, S2, no murmur noted and 2+ pulses throughout  Abdomen: soft, nontender, nondistended, no masses or organomegaly  Neurological: Intubated, moving all 4 extremities, withdrawls to painful stimuli   Extremities:  peripheral pulses normal, no pedal edema, no clubbing or cyanosis      Laboratory findings:-    CBC:   Recent Labs     09/28/22  1745   WBC 13.8*   HGB 11.8*        BMP:    Recent Labs     09/28/22  1745   *   K 3.3*   CL 98   CO2 21   BUN 9   CREATININE 0.68   GLUCOSE 112*     S. Calcium:  Recent Labs     09/28/22  1745   CALCIUM 8.3*     S. Ionized Calcium:No results for input(s): IONCA in the last 72 hours. S. Magnesium:No results for input(s): MG in the last 72 hours.   S. Phosphorus:No results for input(s): PHOS in the last 72 hours. S. Glucose:No results for input(s): POCGLU in the last 72 hours. Glycosylated hemoglobin A1C: No results for input(s): LABA1C in the last 72 hours. INR:   Recent Labs     09/28/22  1745   INR 1.3     Hepatic functions:   Recent Labs     09/28/22 1745   ALKPHOS 55   ALT 6   AST 14   PROT 6.9   BILITOT 0.9   LABALBU 3.5     Pancreatic functions:No results for input(s): LACTA, AMYLASE in the last 72 hours. S. Lactic Acid: No results for input(s): LACTA in the last 72 hours. Cardiac enzymes:No results for input(s): CKTOTAL, CKMB, CKMBINDEX, TROPONINI in the last 72 hours. BNP:No results for input(s): BNP in the last 72 hours. Lipid profile: No results for input(s): CHOL, TRIG, HDL, LDL, LDLCALC in the last 72 hours. Blood Gases: No results found for: PH, PCO2, PO2, HCO3, O2SAT  Thyroid functions:   Lab Results   Component Value Date/Time    TSH 1.73 03/21/2022 09:14 AM        Urinalysis:     Microbiology:  Cultures during this admission:     Blood cultures:                 [] None drawn      [x] Negative             []  Positive (Details:  )  Urine Culture:                   [] None drawn      [x] Negative             []  Positive (Details:  )  Sputum Culture:               [] None drawn       [x] Negative             []  Positive (Details:  )   Endotracheal aspirate:     [] None drawn       [] Negative             []  Positive (Details:  )         -----------------------------------------------------------------  Radiological reports:  CT CHEST PULMONARY EMBOLISM W CONTRAST   Final Result   No evidence of pulmonary embolism or dissection. Dense consolidation within the lower lobes bilaterally, as well as in the   right middle lobe, most compatible with severe lobar pneumonia. Aspiration   would also be a consideration. This process must be followed to resolution to ensure that there is no   underlying neoplasm.          XR CHEST PORTABLE   Final Result      Focal consolidative changes of right middle lobe and right lower lobe. Findings are likely suggestive of pneumonia.          XR CHEST PORTABLE    (Results Pending)   XR ABDOMEN FOR NG/OG/NE TUBE PLACEMENT    (Results Pending)   XR CHEST PORTABLE    (Results Pending)            Assessment and Plan     Patient Active Problem List   Diagnosis    Fatigue    Depression    Lumbar radiculopathy, chronic    Degeneration of cervical intervertebral disc    Gastro-esophageal reflux disease without esophagitis    Generalized anxiety disorder    Essential hypertension    Opioid use, unspecified with other opioid-induced disorder (HCC)(Suboxone)    History of opiate therapy    Smoker    Family history of colon cancer    Family history of breast cancer in mother    Family history of early CAD    Advanced maternal age in multigravida    Dichorionic diamniotic twin pregnancy, antepartum    H/O opioid abuse ([address]    Hx of preeclampsia, prior pregnancy, currently pregnant    Maternal antithrombin III deficiency complicating pregnancy ([address]    Pre-eclampsia in third trimester    Chronic allergic rhinitis    Protein malnutrition ([address]    Vitamin D deficiency    Opioid dependence on agonist therapy ([address]    Peripheral edema    H/O bilateral salpingectomy    Chronic midline low back pain with right-sided sciatica    Chronic neck pain    Sepsis ([address]    Pneumonia due to infectious organism    Hypokalemia    Pneumonia         Additional assessment:  Severe Pneumonia with respiratory Failure       Plan:  Neuro:  Intubated and sedated   Propofol and fent gtt for sedation   Wean versed as able   Fent gtt- Pain control  Neuro checks per protocol     Resp:  CT with severe lobar pneumonia in bilateral lower lungs and RML   COVID negative   F/u RPP   Daily CXR   Azithromycin and rocephin for CAP   PRVC 16/470/10/100%   ABG Pending   VBG 7.41/38/48/23.9/82%    Vent Information  Ventilator ID: [id number]    CV:  Hx of HTN   Hold home lisinopril for now MAP goal >65   Hemodynamically stable     GI/Nutrition:  NG in place  NPO   TF tomorrow   H2 blocker     /Fluids/Electrolytes:  BUN/Cr 9/0.68  MIVF 0.9 @100cc/hr   Strict I/O   Monitor and replace electrolytes as needed   Daily BMP     Heme:  H&H 11.8/35.4  Plt 154  Lovenox DVT pppx  Daily CBC    ID:  Severe lobar pneumonia bilaterally   Azithromycin and Rocephin   WBC 13.8   F/u RPP   F/u cultures     Endo:  BG controlled   No insulin needs at this time    MSK:  PT/OT     Prophylaxis:  DVT: Lovenox   GI: H2 blocker     Dispo:   Admit to ICU       Tiffanie Vail DO   Internal Medicine - PGY-3  Intensive Care Unit  9/28/2022, 11:49 PM

## 2022-09-29 NOTE — PROGRESS NOTES
Ventilator Bronchodilator assessment    Breath sounds: Rhonci  Inspiratory Pressure: 24  Plateau Pressure: 16    Patient assessed at level 6          6    Bronchodilator Assessment    BRONCHODILATOR ASSESSMENT SCORE  Score 0 (Home) 1 2 3 4   Breath Sounds   []  Chronic Ventilator: Patient at baseline [x]  Mild Wheezes/ Clear []  Intermittent wheezes with good air entry []  Bilateral/unilateral wheezing with diminished air entry []  Insp/Exp wheeze and/or poor aeration   Ventilator Pressures   []  Chronic Ventilator [x]  Insp. Pressure less than 25 cm H20 []  Insp. Pressure less than 25 cm H20 []  Insp. Pressure exceeds 25 cm H20 []  Insp.  Pressure exceeds 30 cm H20   Plateau Pressure []  NA   []  Plateau Pressure less than 4  []  Plateau Pressure less than or equal to 5 []  Plateau Pressure greater than or equal to 6 [x]  Plateau Pressure greater than or equal to 8       RAZ GEE  1:21 AM

## 2022-09-29 NOTE — PLAN OF CARE
Problem: Respiratory - Adult  Goal: Achieves optimal ventilation and oxygenation  Outcome ongoing  Flowsheets (Taken 9/29/2022 4370)  Achieves optimal ventilation and oxygenation:   Assess for changes in respiratory status   Position to facilitate oxygenation and minimize respiratory effort   Assess the need for suctioning and aspirate as needed   Respiratory therapy support as indicated   Assess for changes in mentation and behavior   Oxygen supplementation based on oxygen saturation or arterial blood gases

## 2022-09-29 NOTE — PROGRESS NOTES
Date: 9/28/2022  Time: 2224  Patient identity confirmed:  Yes  Indications: Airway protection  Preoxygenation: yes    Laryngoscope size and type Glidescope  Airway introducer used: No  Evac: No  ETT size:a 7.5 cuffed  Number of attempts:1   Cords visualized:  [x] Clearly  [] Poorly  Breath sounds present bilaterally: Yes   ETCO2   [x] Positive   ETT secured at  24 at the teeth    ETT secured with [place]  Chest x-ray ordered: Yes     Difficult airway:    No       If yes, was red tape placed around ETT:   No    Was this a Code Situation:    No             BP: 109/72        Procedure performed by: Cristi Chávez RCP  10:47 PM

## 2022-09-29 NOTE — PROGRESS NOTES
Comprehensive Nutrition Assessment    Type and Reason for Visit:  Initial    Nutrition Recommendations/Plan:   Continue NPO. Monitor plans to start diet/nutrition support  If TF, recommend Immune Enhancing formula with goal rate of 45 ml/hr with propofol at current rate. Malnutrition Assessment:  Malnutrition Status:  Insufficient data (09/29/22 1404)    Context:  Acute Illness     Findings of the 6 clinical characteristics of malnutrition:  Energy Intake:  Mild decrease in energy intake (Comment) (NPO)  Weight Loss:  No significant weight loss     Body Fat Loss:  Unable to assess     Muscle Mass Loss:  Unable to assess    Fluid Accumulation:  Unable to assess     Strength:  Not Performed    Nutrition Assessment:    Patient admitted for sepsis and pneumonia. She is intubated and sedated. Meds include Senna. Labs reviewed: Na 134, Ca 7.8. Propofol at 17.4 during visit. Nutrition Related Findings:    Labs/meds reviewed Wound Type: None       Current Nutrition Intake & Therapies:    Average Meal Intake: NPO  Average Supplements Intake: NPO  No diet orders on file    Anthropometric Measures:  Height: 5' 8\" (172.7 cm)  Ideal Body Weight (IBW): 140 lbs (64 kg)       Current Body Weight: 156 lb (70.8 kg), 111.4 % IBW. Weight Source: Bed Scale  Current BMI (kg/m2): 23.7                          BMI Categories: Normal Weight (BMI 18.5-24. 9)    Estimated Daily Nutrient Needs:  Energy Requirements Based On: Kcal/kg  Weight Used for Energy Requirements: Current  Energy (kcal/day): 1621-9929 kcal  Weight Used for Protein Requirements: Current  Protein (g/day): 90-95 g  Method Used for Fluid Requirements: 1 ml/kcal  Fluid (ml/day): 2000 mL or per MD    Nutrition Diagnosis:   Inadequate oral intake related to impaired respiratory function as evidenced by intubation    Nutrition Interventions:   Food and/or Nutrient Delivery: Continue NPO  Nutrition Education/Counseling: No recommendation at this time  Coordination of Nutrition Care: Continue to monitor while inpatient       Goals:     Goals: Meet at least 75% of estimated needs       Nutrition Monitoring and Evaluation:   Behavioral-Environmental Outcomes: None Identified  Food/Nutrient Intake Outcomes: Diet Advancement/Tolerance  Physical Signs/Symptoms Outcomes: Biochemical Data, GI Status, Skin, Weight    Discharge Planning:     Too soon to determine     Micheline Katz [address] [place] [address]  Contact: [phone]

## 2022-09-29 NOTE — PROGRESS NOTES
INTENSIVE CARE UNIT  Resident Physician Progress Note    Patient - Marty Menjivar  Date of Admission -  9/28/2022  5:16 PM  Date of Evaluation -  9/29/2022  Room and Bed Number -  7581/9126-94   Hospital Day - 1    SUBJECTIVE:     HISTORY OF PRESENT ILLNESS:    40 yo female PMH of opioid use and dependence on suboxone, HTN and anxiety. Presented to the ED with SOB for 3 days, along with fever, cough, congestion and body aches. She presented tachypnic and was desaturating to the low 80s. She was placed on NRB with improvement of her saturations and RR. She has a history of DVT provoked by pregnancy in the past, not on any AC at this time. CT PE was done, no PE but significant for severe lobar pneumonia in bilateral lower lobes and right middle lobe. COVID swab was negative. Labs showed mild hypokalemia, leukocytosis of 13.8 otherwise unremarkable. Started on azithromycin and rocephin for CAP. Patient had a worsening respiratory status while in the ED, was intubated due to hypoxia. Was initially sedated on propofol, versed and fentanyl. Hemodynamically stable. OVERNIGHT EVENTS:      Versed weaned down. Intubated and sedated. TODAY:    Following commands and arousable. Per significant other, children at home were also recently sick. No other medical history and patient has no history of COPD or asthma. He also states that her legs were swollen yesterday and are now improved.        Tmax 102.2  HR 73-88  MAP 72-90  Fent gtt 50mcg/hr  Propofol gtt 30 mcg/kg/hr  PRVC 16/470/10/80%  ABG pH7.441/35.6/121.2/24.2          OBJECTIVE:     VITAL SIGNS:  Patient Vitals for the past 8 hrs:   BP Temp Temp src Pulse Resp SpO2   09/29/22 1200 90/63 98.4 °F (36.9 °C) Bladder 70 16 92 %   09/29/22 1115 89/62 -- -- 69 17 93 %   09/29/22 1100 88/61 98.1 °F (36.7 °C) Bladder 69 17 92 %   09/29/22 1045 97/68 -- -- 69 17 96 %   09/29/22 1030 99/70 -- -- 70 18 97 %   09/29/22 1015 98/73 -- -- 70 18 97 %   09/29/22 1000 99/70 98.6 °F (37 °C) Bladder 71 18 98 %   22 0945 96/64 -- -- 71 19 98 %   22 0930 92/67 -- -- 71 19 98 %   22 0915 93/63 -- -- 72 19 97 %   22 0900 92/64 99.9 °F (37.7 °C) Bladder 74 20 96 %   22 0800 110/77 99.5 °F (37.5 °C) Bladder 77 22 99 %   22 0755 -- -- -- 78 23 99 %   22 0730 97/68 -- -- 77 22 96 %   22 0700 91/66 100 °F (37.8 °C) Bladder 78 21 94 %   22 0615 111/79 -- -- 81 20 96 %   22 0600 108/81 100.2 °F (37.9 °C) Bladder 81 23 95 %   22 0545 102/70 -- -- 81 21 94 %   22 0530 98/71 -- -- 80 22 95 %   22 0515 95/65 -- -- 80 22 96 %   22 0500 88/66 -- -- 81 22 96 %   22 0445 90/62 -- -- 82 22 98 %   22 0430 92/ -- -- 83 23 97 %       Last Body weight:   Wt Readings from Last 3 Encounters:   22 156 lb 4.9 oz (70.9 kg)   22 157 lb (71.2 kg)   22 157 lb (71.2 kg)       Body Mass Index : Body mass index is 23.77 kg/m². Tmax over 24 hours: Temp (24hrs), Av.5 °F (37.5 °C), Min:98.1 °F (36.7 °C), Max:102.2 °F (39 °C)      Ins/Outs: In: 1836.2 [I.V.:964.7; NG/GT:90]  Out: 36 [Urine:618]    PHYSICAL EXAM:  Constitutional: Appears well, no distress  EENT: PERRLA, EOMI,  no lesions, neck supple with midline trachea.  Intubated  Neck: Supple, symmetrical, trachea midline  Respiratory: mechanical breath sounds, diminished bilaterally  Cardiovascular: regular rate and rhythm, normal S1, S2, no murmur noted and 2+ pulses throughout  Abdomen: soft, nontender, nondistended, no masses or organomegaly  Extremities:  peripheral pulses normal, no pedal edema, no clubbing or cyanosis    MEDICATIONS:  Scheduled Meds:   aspirin  81 mg Oral Daily    gabapentin  100 mg Oral TID    lisinopril  5 mg Oral Daily    enoxaparin  40 mg SubCUTAneous Daily    cefTRIAXone (ROCEPHIN) IV  1,000 mg IntraVENous Q24H    And    azithromycin  500 mg IntraVENous Q24H    sodium chloride flush  5-40 mL IntraVENous 2 times per day sennosides-docusate sodium  1 tablet Oral BID    famotidine (PEPCID) injection  20 mg IntraVENous BID       Continuous Infusions:   sodium chloride      fentaNYL 50 mcg/mL 75 mcg/hr (09/29/22 0813)    midazolam      propofol 40 mcg/kg/min (09/29/22 0918)    sodium chloride 125 mL/hr at 09/29/22 0813       PRN Meds:   sodium chloride, , PRN  acetaminophen, 650 mg, Q6H PRN   Or  acetaminophen, 650 mg, Q6H PRN  sodium chloride flush, 5-40 mL, PRN  ondansetron, 4 mg, Q8H PRN   Or  ondansetron, 4 mg, Q6H PRN  polyethylene glycol, 17 g, Daily PRN      SUPPORT DEVICES: [x] Ventilator [] BIPAP  [] Nasal Cannula [] Room Air    VENT SETTINGS (Comprehensive) (if applicable): PRVC mode, FiO2 80%, PEEP 10, Respiratory Rate 16, Tidal Volume 470  Vent Information  Ventilator ID: [id number]  Additional Respiratory Assessments  Heart Rate: 70  Resp: 16  SpO2: 92 %  End Tidal CO2: 33 (%)  Position: Semi-Castro's  Lab Results   Component Value Date/Time    MODE [place] 09/29/2022 04:11 AM       ABGs:   Arterial Blood Gas result:  pH 7.44; pCO2 35.6; pO2 121.2; HCO3 24.2; BE 0; %O2 Sat 99.   No results found for: PH, PCO2, PO2, HCO3, O2SAT    DATA:  Complete Blood Count:   Recent Labs     09/28/22 1745 09/29/22 0852   WBC 13.8* 12.3*   RBC 3.90* 3.39*   HGB 11.8* 10.4*   HCT 35.4* 31.3*   MCV 90.8 92.3   MCH 30.3 30.7   MCHC 33.3 33.2   RDW 14.2 14.2    See Reflexed IPF Result   MPV 11.7  --         Last 3 Blood Glucose:   Recent Labs     09/28/22 1745 09/29/22  0852   GLUCOSE 112* 93        PT/INR:    Lab Results   Component Value Date/Time    PROTIME 12.7 09/29/2022 04:17 AM    INR 1.3 09/29/2022 04:17 AM     PTT:    Lab Results   Component Value Date/Time    APTT 32.1 09/28/2022 05:45 PM       Basic Metabolic Profile:   Recent Labs     09/28/22  1745 09/29/22  0852   * 134*   K 3.3* 3.7   CL 98 103   CO2 21 22   BUN 9 9   CREATININE 0.68 0.39*   GLUCOSE 112* 93       Liver Function:  Recent Labs     09/28/22  1745 PROT 6.9   LABALBU 3.5   ALT 6   AST 14   ALKPHOS 55   BILITOT 0.9       Magnesium: No results found for: MG  Phosphorus: No results found for: PHOS  Ionized Calcium: No results found for: CAION     Urinalysis:   Lab Results   Component Value Date/Time    NITRU NEGATIVE 09/28/2022 09:52 PM    COLORU Yellow 09/28/2022 09:52 PM    PHUR 7.5 09/28/2022 09:52 PM    WBCUA None 09/28/2022 09:52 PM    RBCUA 0 TO 2 09/28/2022 09:52 PM    MUCUS NOT REPORTED 05/01/2012 04:35 AM    TRICHOMONAS NOT REPORTED 05/01/2012 04:35 AM    YEAST NOT REPORTED 05/01/2012 04:35 AM    BACTERIA None 03/21/2022 10:17 AM    SPECGRAV 1.005 09/28/2022 09:52 PM    LEUKOCYTESUR NEGATIVE 09/28/2022 09:52 PM    UROBILINOGEN Normal 09/28/2022 09:52 PM    BILIRUBINUR NEGATIVE 09/28/2022 09:52 PM    BILIRUBINUR NEGATIVE 05/01/2012 04:35 AM    GLUCOSEU NEGATIVE 09/28/2022 09:52 PM    GLUCOSEU NEGATIVE 05/01/2012 04:35 AM    KETUA NEGATIVE 09/28/2022 09:52 PM    AMORPHOUS NOT REPORTED 05/01/2012 04:35 AM       HgBA1c:    Lab Results   Component Value Date/Time    LABA1C 5.1 03/21/2022 09:14 AM     TSH:    Lab Results   Component Value Date/Time    TSH 1.73 03/21/2022 09:14 AM     Lactic Acid: No results found for: LACTA   Troponin: No results for input(s): TROPONINI in the last 72 hours. Microbiology:  Urine Culture:  No components found for: CURINE  Blood Culture:  No components found for: CBLOOD, CFUNGUSBL  Sputum Culture:  No components found for: CSPUTUM    Other Labs:  Results for orders placed or performed during the hospital encounter of 09/28/22   Culture, Blood 1    Specimen: Blood   Result Value Ref Range    Specimen Description . BLOOD     Special Requests  L AC 10ML     Culture NO GROWTH 12 HOURS    Culture, Blood 1    Specimen: Blood   Result Value Ref Range    Specimen Description . BLOOD     Special Requests  r forearm 10ml     Culture NO GROWTH 12 HOURS    COVID-19, Rapid    Specimen: Nasopharyngeal Swab   Result Value Ref Range Specimen Description . NASOPHARYNGEAL SWAB     SARS-CoV-2, Rapid Not Detected Not Detected   Culture, Blood 1    Specimen: Blood   Result Value Ref Range    Specimen Description . BLOOD     Special Requests LT FA 4ML     Culture NO GROWTH <24 HRS    Sputum gram stain    Specimen: Endotracheal   Result Value Ref Range    Specimen Description . ENDOTRACHEAL     Direct Exam DUPLICATE ORDER    CBC with Auto Differential   Result Value Ref Range    WBC 13.8 (H) 3.5 - 11.3 k/uL    RBC 3.90 (L) 3.95 - 5.11 m/uL    Hemoglobin 11.8 (L) 11.9 - 15.1 g/dL    Hematocrit 35.4 (L) 36.3 - 47.1 %    MCV 90.8 82.6 - 102.9 fL    MCH 30.3 25.2 - 33.5 pg    MCHC 33.3 28.4 - 34.8 g/dL    RDW 14.2 11.8 - 14.4 %    Platelets 760 468 - 464 k/uL    MPV 11.7 8.1 - 13.5 fL    NRBC Automated 0.0 0.0 per 100 WBC    Immature Granulocytes 2 (H) 0 %    Seg Neutrophils 86 (H) 36 - 66 %    Lymphocytes 6 (L) 24 - 44 %    Monocytes 6 1 - 7 %    Eosinophils % 0 (L) 1 - 4 %    Basophils 0 0 - 2 %    Absolute Immature Granulocyte 0.28 0.00 - 0.30 k/uL    Segs Absolute 11.86 (H) 1.8 - 7.7 k/uL    Absolute Lymph # 0.83 (L) 1.0 - 4.8 k/uL    Absolute Mono # 0.83 (H) 0.1 - 0.8 k/uL    Absolute Eos # 0.00 0.0 - 0.4 k/uL    Basophils Absolute 0.00 0.0 - 0.2 k/uL    Morphology INCREASED BANDS PRESENT    Comprehensive Metabolic Panel   Result Value Ref Range    Glucose 112 (H) 70 - 99 mg/dL    BUN 9 6 - 20 mg/dL    Creatinine 0.68 0.50 - 0.90 mg/dL    Calcium 8.3 (L) 8.6 - 10.4 mg/dL    Sodium 131 (L) 135 - 144 mmol/L    Potassium 3.3 (L) 3.7 - 5.3 mmol/L    Chloride 98 98 - 107 mmol/L    CO2 21 20 - 31 mmol/L    Anion Gap 12 9 - 17 mmol/L    Alkaline Phosphatase 55 35 - 104 U/L    ALT 6 5 - 33 U/L    AST 14 <32 U/L    Total Bilirubin 0.9 0.3 - 1.2 mg/dL    Total Protein 6.9 6.4 - 8.3 g/dL    Albumin 3.5 3.5 - 5.2 g/dL    Albumin/Globulin Ratio 1.0 1.0 - 2.5    GFR Non-African American >60 >60 mL/min    GFR African American >60 >60 mL/min    GFR Comment Lactate, Sepsis   Result Value Ref Range    Lactic Acid, Sepsis, Whole Blood 1.3 0.5 - 1.9 mmol/L   Protime-INR   Result Value Ref Range    Protime 12.7 (H) 9.1 - 12.3 sec    INR 1.3    APTT   Result Value Ref Range    PTT 32.1 (H) 20.5 - 30.5 sec   Troponin   Result Value Ref Range    Troponin, High Sensitivity <6 0 - 14 ng/L   Troponin   Result Value Ref Range    Troponin, High Sensitivity <6 0 - 14 ng/L   Urinalysis with Microscopic   Result Value Ref Range    Color, UA Yellow Yellow    Turbidity UA Clear Clear    Glucose, Ur NEGATIVE NEGATIVE    Bilirubin Urine NEGATIVE NEGATIVE    Ketones, Urine NEGATIVE NEGATIVE    Specific [place], UA 1.005 1.005 - 1.030    Urine Hgb NEGATIVE NEGATIVE    pH, UA 7.5 5.0 - 8.0    Protein, UA NEGATIVE NEGATIVE    Urobilinogen, Urine Normal Normal    Nitrite, Urine NEGATIVE NEGATIVE    Leukocyte Esterase, Urine NEGATIVE NEGATIVE    WBC, UA None 0 - 5 /HPF    RBC, UA 0 TO 2 0 - 4 /HPF    Epithelial Cells UA None 0 - 5 /HPF   Lactic Acid   Result Value Ref Range    Lactic Acid, Whole Blood 1.2 0.7 - 2.1 mmol/L   HCG Qualitative, Serum   Result Value Ref Range    hCG Qual NEGATIVE NEGATIVE   Brain Natriuretic Peptide   Result Value Ref Range    Pro- <300 pg/mL   SODIUM (POC)   Result Value Ref Range    POC Sodium 136 (L) 138 - 146 mmol/L   POTASSIUM (POC)   Result Value Ref Range    POC Potassium 3.2 (L) 3.5 - 4.5 mmol/L   CHLORIDE (POC)   Result Value Ref Range    POC Chloride 99 98 - 107 mmol/L   CALCIUM, IONIC (POC)   Result Value Ref Range    POC Ionized Calcium 1.08 (L) 1.15 - 1.33 mmol/L   BLOOD GAS, VENOUS   Result Value Ref Range    pH, Vipul 7.415 7.320 - 7.420    pCO2, Vipul 38.0 (L) 39 - 55 mm Hg    pO2, Vipul 48.8 30 - 50 mm Hg    HCO3, Venous 23.9 (L) 24 - 30 mmol/L    Positive Base Excess, Vipul 0.0 0.0 - 2.0 mmol/L    O2 Sat, Vipul 82.3 60.0 - 85.0 %    Carboxyhemoglobin 1.7 0 - 5 %    Pt Temp 37.0     FIO2 INFORMATION NOT PROVIDED    Lactate, Sepsis   Result Value Ref Range    Lactic Acid, Sepsis, Whole Blood 2.0 (H) 0.5 - 1.9 mmol/L   Lactate, Sepsis   Result Value Ref Range    Lactic Acid, Sepsis, Whole Blood 4.2 (H) 0.5 - 1.9 mmol/L   Protime-INR   Result Value Ref Range    Protime 12.7 (H) 9.1 - 12.3 sec    INR 1.3    Basic Metabolic Panel w/ Reflex to MG   Result Value Ref Range    Glucose 93 70 - 99 mg/dL    BUN 9 6 - 20 mg/dL    Creatinine 0.39 (L) 0.50 - 0.90 mg/dL    Calcium 7.8 (L) 8.6 - 10.4 mg/dL    Sodium 134 (L) 135 - 144 mmol/L    Potassium 3.7 3.7 - 5.3 mmol/L    Chloride 103 98 - 107 mmol/L    CO2 22 20 - 31 mmol/L    Anion Gap 9 9 - 17 mmol/L    GFR Non-African American >60 >60 mL/min    GFR African American >60 >60 mL/min    GFR Comment         CBC with Auto Differential   Result Value Ref Range    WBC 12.3 (H) 3.5 - 11.3 k/uL    RBC 3.39 (L) 3.95 - 5.11 m/uL    Hemoglobin 10.4 (L) 11.9 - 15.1 g/dL    Hematocrit 31.3 (L) 36.3 - 47.1 %    MCV 92.3 82.6 - 102.9 fL    MCH 30.7 25.2 - 33.5 pg    MCHC 33.2 28.4 - 34.8 g/dL    RDW 14.2 11.8 - 14.4 %    Platelets See Reflexed IPF Result 138 - 453 k/uL    NRBC Automated 0.0 0.0 per 100 WBC    Immature Granulocytes 1 (H) 0 %    Seg Neutrophils 87 (H) 36 - 66 %    Lymphocytes 8 (L) 24 - 44 %    Monocytes 3 1 - 7 %    Eosinophils % 0 (L) 1 - 4 %    Basophils 1 0 - 2 %    Absolute Immature Granulocyte 0.12 0.00 - 0.30 k/uL    Segs Absolute 10.71 (H) 1.8 - 7.7 k/uL    Absolute Lymph # 0.98 (L) 1.0 - 4.8 k/uL    Absolute Mono # 0.37 0.1 - 0.8 k/uL    Absolute Eos # 0.00 0.0 - 0.4 k/uL    Basophils Absolute 0.12 0.0 - 0.2 k/uL    Morphology Normal    Immature Platelet Fraction   Result Value Ref Range    Platelet, Immature Fraction 6.3 1.1 - 10.3 %    Platelet, Fluorescence 127 (L) 138 - 453 k/uL   Venous Blood Gas, POC   Result Value Ref Range    pH, Vipul 7.402 7.320 - 7.430    pCO2, Vipul 40.1 (L) 41.0 - 51.0 mm Hg    pO2, Vipul 30.4 30.0 - 50.0 mm Hg    HCO3, Venous 24.9 22.0 - 29.0 mmol/L    Positive Base Excess, Vipul 0 0.0 - 3.0    O2 Sat, Vipul 58 (L) 60.0 - 85.0 %   Anion Gap (Calc) POC   Result Value Ref Range    Anion Gap 12 7 - 16 mmol/L   Arterial Blood Gas, POC   Result Value Ref Range    POC pH 7.381 7.350 - 7.450    POC pCO2 42.1 35.0 - 48.0 mm Hg    POC PO2 79.2 (L) 83.0 - 108.0 mm Hg    POC HCO3 25.0 21.0 - 28.0 mmol/L    Positive Base Excess, Art 0 0.0 - 3.0    POC O2 SAT 95 94.0 - 98.0 %    O2 Device/Flow/% Adult Ventilator     Nacho Test POSITIVE     Sample Site Arterial Line     Mode PRVC     FIO2 100.0    POCT Glucose   Result Value Ref Range    POC Glucose 97 74 - 100 mg/dL   Arterial Blood Gas, POC   Result Value Ref Range    POC pH 7.441 7.350 - 7.450    POC pCO2 35.6 35.0 - 48.0 mm Hg    POC PO2 121.2 (H) 83.0 - 108.0 mm Hg    POC HCO3 24.2 21.0 - 28.0 mmol/L    Positive Base Excess, Art 0 0.0 - 3.0    POC O2 SAT 99 (H) 94.0 - 98.0 %    O2 Device/Flow/% Adult Ventilator     Nacho Test POSITIVE     Sample Site Arterial Line     Mode PRVC     FIO2 100.0    POCT Glucose   Result Value Ref Range    POC Glucose 87 74 - 100 mg/dL   EKG 12 Lead   Result Value Ref Range    Ventricular Rate 90 BPM    Atrial Rate 90 BPM    P-R Interval 140 ms    QRS Duration 82 ms    Q-T Interval 354 ms    QTc Calculation (Bazett) 433 ms    P Axis 60 degrees    R Axis 52 degrees    T Axis 69 degrees       Radiology/Imaging:  XR CHEST PORTABLE   Final Result   Similar bibasilar opacities, greater on the right. XR ABDOMEN FOR NG/OG/NE TUBE PLACEMENT   Final Result   The enteric catheter tip terminates in the region of the proximal to mid   gastric body junction. XR CHEST PORTABLE   Final Result   ET tube measures 5.8 cm above the lila. Right basilar pneumonia, with mild increased lung expansion and improved   aeration at the right lung base. New linear airspace disease at the left lung base likely representing   atelectasis.          CT CHEST PULMONARY EMBOLISM W CONTRAST   Final Result   No evidence of pulmonary embolism or dissection. Dense consolidation within the lower lobes bilaterally, as well as in the   right middle lobe, most compatible with severe lobar pneumonia. Aspiration   would also be a consideration. This process must be followed to resolution to ensure that there is no   underlying neoplasm. XR CHEST PORTABLE   Final Result      Focal consolidative changes of right middle lobe and right lower lobe. Findings are likely suggestive of pneumonia.          XR CHEST PORTABLE    (Results Pending)       ASSESSMENT:     Patient Active Problem List    Diagnosis Date Noted    Sepsis ([address] 09/28/2022    Pneumonia due to infectious organism 09/28/2022    Hypokalemia 09/28/2022    Pneumonia 09/28/2022    Chronic midline low back pain with right-sided sciatica 05/24/2022    Chronic neck pain 05/24/2022    Protein malnutrition ([address] 03/24/2022    Vitamin D deficiency 03/24/2022    Opioid dependence on agonist therapy ([address] 03/24/2022    Peripheral edema 03/24/2022    H/O bilateral salpingectomy 03/24/2022    Chronic allergic rhinitis 11/22/2021    Pre-eclampsia in third trimester 06/23/2021    Maternal antithrombin III deficiency complicating pregnancy ([address] 03/08/2021    H/O opioid abuse ([address] 02/19/2021    Advanced maternal age in multigravida 02/10/2021    Dichorionic diamniotic twin pregnancy, antepartum 02/10/2021    Hx of preeclampsia, prior pregnancy, currently pregnant 02/10/2021    Degeneration of cervical intervertebral disc 12/21/2020    Gastro-esophageal reflux disease without esophagitis 12/21/2020    Generalized anxiety disorder 12/21/2020    Essential hypertension 12/21/2020    Opioid use, unspecified with other opioid-induced disorder (HCC)(Suboxone) 12/21/2020    History of opiate therapy 12/21/2020    Smoker 12/21/2020    Family history of colon cancer 12/21/2020    Family history of breast cancer in mother 12/21/2020    Family history of early CAD 12/21/2020    Lumbar radiculopathy, chronic 2013    Fatigue 2012    Depression 2012        PLAN:       PLAN/MEDICAL DECISION MAKING:  Neurologic:   Neuro intact  Neuro checks per protocol  propofol and fentanyl  Alert and following commands  Cardiovascular:  Hemodynamically stable  MAP goal 65  Pulmonary:  Maintain oxygen sats >92%  Pulmonary toilet  Vent Information  Ventilator ID: [id number]  GI/Nutrition  sennakot 2 tabs in AM  Ulcer Prophylaxis: PPI not indicated  Diet:No diet orders on file  Renal/Fluid/Electrolyte  IV Fluids: 0.9NS @ 125 mL/Hr   I/O: In: 1836.2 [I.V.:964.7; NG/GT:90]  Out: 618 [Urine:618]  UOP: 0.47 cc/kg/hr  Monitor electrolytes, replace PRN   ID  WBC:   Lab Results   Component Value Date    WBC 12.3 (H) 2022     Tmax: Temp (24hrs), Av.5 °F (37.5 °C), Min:98.1 °F (36.7 °C), Max:102.2 °F (39 °C)    rpp  Antimicrobials: ceftriaxone and azithromycin  Hematology:  Recent Labs     22  1745 22  0852   HGB 11.8* 10.4*    trending down  Endocrine:   glucose controlled - most recent BGL is   Recent Labs     22  1745 22  0852   GLUCOSE 112* 93     DVT Prophylaxis  lovenox  Discharge Needs:  PT, OT, ST, SW, and Case Management      CODE STATUS: Full Code      DISPOSITION:  [x] To remain ICU: Intubated   [] OK for out of ICU from [address], [place]  Emergency Medicine [address] [place] [address]  2022, 7:17 AM EDT

## 2022-09-30 ENCOUNTER — APPOINTMENT (OUTPATIENT)
Dept: GENERAL RADIOLOGY | Age: 42
DRG: 720 | End: 2022-09-30
Payer: COMMERCIAL

## 2022-09-30 PROBLEM — J96.01 ACUTE RESPIRATORY FAILURE WITH HYPOXIA (HCC): Status: ACTIVE | Noted: 2022-09-30

## 2022-09-30 PROBLEM — R06.82 TACHYPNEA: Status: ACTIVE | Noted: 2022-09-30

## 2022-09-30 LAB
ABSOLUTE EOS #: 0 K/UL (ref 0–0.4)
ABSOLUTE IMMATURE GRANULOCYTE: 0 K/UL (ref 0–0.3)
ABSOLUTE LYMPH #: 0.83 K/UL (ref 1–4.8)
ABSOLUTE MONO #: 0.52 K/UL (ref 0.1–0.8)
ALLEN TEST: POSITIVE
ANION GAP SERPL CALCULATED.3IONS-SCNC: 11 MMOL/L (ref 9–17)
BASOPHILS # BLD: 0 % (ref 0–2)
BASOPHILS ABSOLUTE: 0 K/UL (ref 0–0.2)
BUN BLDV-MCNC: 7 MG/DL (ref 6–20)
CALCIUM IONIZED: 1.08 MMOL/L (ref 1.13–1.33)
CALCIUM SERPL-MCNC: 7.7 MG/DL (ref 8.6–10.4)
CHLORIDE BLD-SCNC: 107 MMOL/L (ref 98–107)
CO2: 19 MMOL/L (ref 20–31)
CREAT SERPL-MCNC: 0.37 MG/DL (ref 0.5–0.9)
EOSINOPHILS RELATIVE PERCENT: 0 % (ref 1–4)
FIO2: 40
GFR AFRICAN AMERICAN: >60 ML/MIN
GFR NON-AFRICAN AMERICAN: >60 ML/MIN
GFR SERPL CREATININE-BSD FRML MDRD: ABNORMAL ML/MIN/{1.73_M2}
GLUCOSE BLD-MCNC: 78 MG/DL (ref 70–99)
GLUCOSE BLD-MCNC: 79 MG/DL (ref 74–100)
HCT VFR BLD CALC: 29 % (ref 36.3–47.1)
HEMOGLOBIN: 9.4 G/DL (ref 11.9–15.1)
IMMATURE GRANULOCYTES: 0 %
LACTIC ACID, WHOLE BLOOD: 0.9 MMOL/L (ref 0.7–2.1)
LYMPHOCYTES # BLD: 8 % (ref 24–44)
MCH RBC QN AUTO: 30.1 PG (ref 25.2–33.5)
MCHC RBC AUTO-ENTMCNC: 32.4 G/DL (ref 28.4–34.8)
MCV RBC AUTO: 92.9 FL (ref 82.6–102.9)
MODE: ABNORMAL
MONOCYTES # BLD: 5 % (ref 1–7)
MORPHOLOGY: NORMAL
NEGATIVE BASE EXCESS, ART: 3 (ref 0–2)
NRBC AUTOMATED: 0 PER 100 WBC
O2 DEVICE/FLOW/%: ABNORMAL
PDW BLD-RTO: 14.3 % (ref 11.8–14.4)
PLATELET # BLD: 142 K/UL (ref 138–453)
PMV BLD AUTO: 12.1 FL (ref 8.1–13.5)
POC HCO3: 22.2 MMOL/L (ref 21–28)
POC LACTIC ACID: 0.66 MMOL/L (ref 0.56–1.39)
POC O2 SATURATION: 95 % (ref 94–98)
POC PCO2: 39.2 MM HG (ref 35–48)
POC PH: 7.36 (ref 7.35–7.45)
POC PO2: 77.1 MM HG (ref 83–108)
POTASSIUM SERPL-SCNC: 3.8 MMOL/L (ref 3.7–5.3)
RBC # BLD: 3.12 M/UL (ref 3.95–5.11)
SAMPLE SITE: ABNORMAL
SEG NEUTROPHILS: 87 % (ref 36–66)
SEGMENTED NEUTROPHILS ABSOLUTE COUNT: 9.05 K/UL (ref 1.8–7.7)
SODIUM BLD-SCNC: 137 MMOL/L (ref 135–144)
WBC # BLD: 10.4 K/UL (ref 3.5–11.3)

## 2022-09-30 PROCEDURE — 82330 ASSAY OF CALCIUM: CPT

## 2022-09-30 PROCEDURE — 99291 CRITICAL CARE FIRST HOUR: CPT | Performed by: INTERNAL MEDICINE

## 2022-09-30 PROCEDURE — 71045 X-RAY EXAM CHEST 1 VIEW: CPT

## 2022-09-30 PROCEDURE — 2500000003 HC RX 250 WO HCPCS: Performed by: STUDENT IN AN ORGANIZED HEALTH CARE EDUCATION/TRAINING PROGRAM

## 2022-09-30 PROCEDURE — 2000000000 HC ICU R&B

## 2022-09-30 PROCEDURE — 2580000003 HC RX 258: Performed by: STUDENT IN AN ORGANIZED HEALTH CARE EDUCATION/TRAINING PROGRAM

## 2022-09-30 PROCEDURE — 36600 WITHDRAWAL OF ARTERIAL BLOOD: CPT

## 2022-09-30 PROCEDURE — 85025 COMPLETE CBC W/AUTO DIFF WBC: CPT

## 2022-09-30 PROCEDURE — 6370000000 HC RX 637 (ALT 250 FOR IP): Performed by: STUDENT IN AN ORGANIZED HEALTH CARE EDUCATION/TRAINING PROGRAM

## 2022-09-30 PROCEDURE — 6360000002 HC RX W HCPCS: Performed by: NURSE PRACTITIONER

## 2022-09-30 PROCEDURE — 82947 ASSAY GLUCOSE BLOOD QUANT: CPT

## 2022-09-30 PROCEDURE — 6370000000 HC RX 637 (ALT 250 FOR IP): Performed by: NURSE PRACTITIONER

## 2022-09-30 PROCEDURE — 2700000000 HC OXYGEN THERAPY PER DAY

## 2022-09-30 PROCEDURE — 82803 BLOOD GASES ANY COMBINATION: CPT

## 2022-09-30 PROCEDURE — 83605 ASSAY OF LACTIC ACID: CPT

## 2022-09-30 PROCEDURE — 80048 BASIC METABOLIC PNL TOTAL CA: CPT

## 2022-09-30 PROCEDURE — 36415 COLL VENOUS BLD VENIPUNCTURE: CPT

## 2022-09-30 PROCEDURE — 2500000003 HC RX 250 WO HCPCS: Performed by: NURSE PRACTITIONER

## 2022-09-30 PROCEDURE — 6360000002 HC RX W HCPCS: Performed by: STUDENT IN AN ORGANIZED HEALTH CARE EDUCATION/TRAINING PROGRAM

## 2022-09-30 PROCEDURE — 2580000003 HC RX 258: Performed by: NURSE PRACTITIONER

## 2022-09-30 PROCEDURE — 94761 N-INVAS EAR/PLS OXIMETRY MLT: CPT

## 2022-09-30 RX ORDER — CALCIUM GLUCONATE 20 MG/ML
2000 INJECTION, SOLUTION INTRAVENOUS ONCE
Status: COMPLETED | OUTPATIENT
Start: 2022-09-30 | End: 2022-09-30

## 2022-09-30 RX ORDER — DEXTROSE AND SODIUM CHLORIDE 5; .9 G/100ML; G/100ML
INJECTION, SOLUTION INTRAVENOUS CONTINUOUS
Status: DISCONTINUED | OUTPATIENT
Start: 2022-09-30 | End: 2022-09-30

## 2022-09-30 RX ADMIN — SODIUM CHLORIDE, PRESERVATIVE FREE 10 ML: 5 INJECTION INTRAVENOUS at 10:03

## 2022-09-30 RX ADMIN — BENZOCAINE AND MENTHOL 1 LOZENGE: 15; 3.6 LOZENGE ORAL at 17:24

## 2022-09-30 RX ADMIN — ENOXAPARIN SODIUM 40 MG: 100 INJECTION SUBCUTANEOUS at 10:03

## 2022-09-30 RX ADMIN — PROPOFOL 45 MCG/KG/MIN: 10 INJECTION, EMULSION INTRAVENOUS at 04:26

## 2022-09-30 RX ADMIN — SODIUM CHLORIDE: 9 INJECTION, SOLUTION INTRAVENOUS at 02:33

## 2022-09-30 RX ADMIN — SODIUM CHLORIDE, PRESERVATIVE FREE 20 MG: 5 INJECTION INTRAVENOUS at 10:02

## 2022-09-30 RX ADMIN — ACETAMINOPHEN 650 MG: 325 TABLET ORAL at 13:28

## 2022-09-30 RX ADMIN — PROPOFOL 40 MCG/KG/MIN: 10 INJECTION, EMULSION INTRAVENOUS at 00:15

## 2022-09-30 RX ADMIN — SODIUM CHLORIDE, PRESERVATIVE FREE 10 ML: 5 INJECTION INTRAVENOUS at 20:35

## 2022-09-30 RX ADMIN — GABAPENTIN 100 MG: 100 CAPSULE ORAL at 10:02

## 2022-09-30 RX ADMIN — DEXTROSE AND SODIUM CHLORIDE: 5; 900 INJECTION, SOLUTION INTRAVENOUS at 09:32

## 2022-09-30 RX ADMIN — LISINOPRIL 5 MG: 5 TABLET ORAL at 10:02

## 2022-09-30 RX ADMIN — AZITHROMYCIN DIHYDRATE 500 MG: 500 INJECTION, POWDER, LYOPHILIZED, FOR SOLUTION INTRAVENOUS at 18:56

## 2022-09-30 RX ADMIN — Medication 81 MG: at 10:02

## 2022-09-30 RX ADMIN — SODIUM CHLORIDE: 9 INJECTION, SOLUTION INTRAVENOUS at 18:53

## 2022-09-30 RX ADMIN — SODIUM CHLORIDE, PRESERVATIVE FREE 20 MG: 5 INJECTION INTRAVENOUS at 20:34

## 2022-09-30 RX ADMIN — GABAPENTIN 100 MG: 100 CAPSULE ORAL at 20:00

## 2022-09-30 RX ADMIN — DOCUSATE SODIUM 50 MG AND SENNOSIDES 8.6 MG 1 TABLET: 8.6; 5 TABLET, FILM COATED ORAL at 20:33

## 2022-09-30 RX ADMIN — CALCIUM GLUCONATE 2000 MG: 20 INJECTION, SOLUTION INTRAVENOUS at 20:36

## 2022-09-30 RX ADMIN — DOCUSATE SODIUM 50 MG AND SENNOSIDES 8.6 MG 1 TABLET: 8.6; 5 TABLET, FILM COATED ORAL at 10:02

## 2022-09-30 RX ADMIN — ACETAMINOPHEN 650 MG: 325 TABLET ORAL at 03:55

## 2022-09-30 RX ADMIN — GABAPENTIN 100 MG: 100 CAPSULE ORAL at 13:28

## 2022-09-30 RX ADMIN — CEFTRIAXONE SODIUM 1000 MG: 10 INJECTION, POWDER, FOR SOLUTION INTRAVENOUS at 18:48

## 2022-09-30 RX ADMIN — PROPOFOL 45 MCG/KG/MIN: 10 INJECTION, EMULSION INTRAVENOUS at 09:30

## 2022-09-30 ASSESSMENT — PULMONARY FUNCTION TESTS
PIF_VALUE: 24
PIF_VALUE: 11
PIF_VALUE: 22
PIF_VALUE: 23
PIF_VALUE: 11

## 2022-09-30 ASSESSMENT — PAIN DESCRIPTION - LOCATION
LOCATION: THROAT
LOCATION: THROAT

## 2022-09-30 ASSESSMENT — PAIN SCALES - GENERAL
PAINLEVEL_OUTOF10: 0
PAINLEVEL_OUTOF10: 3
PAINLEVEL_OUTOF10: 3

## 2022-09-30 NOTE — PROGRESS NOTES
Physical Therapy Cancel Note      DATE: 2022    NAME: Catherine Kemp  MRN: [id number]   : 1980      Patient not seen this date for Physical Therapy due to:    Patient is not appropriate for PT evaluation/treatment at this time d/t strict BR, intubated/sedated; check status Monday 10/3 and evaluate as appropriate      Electronically signed by Eric López, PT on 2022 at 9:13 AM

## 2022-09-30 NOTE — PLAN OF CARE
Problem: Respiratory - Adult  Goal: Achieves optimal ventilation and oxygenation  Outcome: Progressing  Flowsheets  Taken 9/30/2022 1600  Achieves optimal ventilation and oxygenation:   Assess for changes in respiratory status   Assess for changes in mentation and behavior   Position to facilitate oxygenation and minimize respiratory effort   Oxygen supplementation based on oxygen saturation or arterial blood gases  Taken 9/30/2022 0800  Achieves optimal ventilation and oxygenation:   Assess for changes in respiratory status   Oxygen supplementation based on oxygen saturation or arterial blood gases     Problem: Discharge Planning  Goal: Discharge to home or other facility with appropriate resources  Outcome: Progressing  Flowsheets (Taken 9/30/2022 1600)  Discharge to home or other facility with appropriate resources:   Identify barriers to discharge with patient and caregiver   Arrange for needed discharge resources and transportation as appropriate   Identify discharge learning needs (meds, wound care, etc)   Refer to discharge planning if patient needs post-hospital services based on physician order or complex needs related to functional status, cognitive ability or social support system     Problem: Pain  Goal: Verbalizes/displays adequate comfort level or baseline comfort level  Outcome: Progressing  Flowsheets (Taken 9/30/2022 0700)  Verbalizes/displays adequate comfort level or baseline comfort level: Assess pain using appropriate pain scale     Problem: Skin/Tissue Integrity  Goal: Absence of new skin breakdown  Description: 1. Monitor for areas of redness and/or skin breakdown  2. Assess vascular access sites hourly  3. Every 4-6 hours minimum:  Change oxygen saturation probe site  4. Every 4-6 hours:  If on nasal continuous positive airway pressure, respiratory therapy assess nares and determine need for appliance change or resting period.   Outcome: Progressing     Problem: Safety - Adult  Goal: Free from fall injury  Outcome: Progressing     Problem: ABCDS Injury Assessment  Goal: Absence of physical injury  Outcome: Progressing

## 2022-09-30 NOTE — PROGRESS NOTES
[facility]up[facility] [facility] [employer] [facility]  Occupational Therapy Not Seen Note    DATE: 2022    NAME: Trudy Atkins  MRN: [id number]   : 1980      Patient not seen this date for Occupational Therapy due to:    Patient is not appropriate for active participation in OT evaluation/treatment at this time d/t intubated/sedated/SBR.     Next Scheduled Treatment: Check 10/3/2022     Electronically signed by Toño Aguirre OT on 2022 at 12:07 PM

## 2022-09-30 NOTE — PROGRESS NOTES
Order obtained for extubation. Positive leak heard    SpO2 of 97 on 40% FiO2. Patient extubated and placed on 6 liters/min via nasal cannula. Post extubation SpO2 is 91% with HR  86 bpm and RR 22 breaths/min. Patient had moderate cough that was productive of yellow sputum. Extubation Well tolerated by patient. .   Breath Sounds: clear    ERNESTO BROWN RCP   12:44 PM

## 2022-09-30 NOTE — CARE COORDINATION
Transitional Planning  Intubated/sedated 40% Peep 5  Covid negative   Await extubation and PT OT karen  Per admissions pt lives with parent one level with 4 steps into. 14:47  Pt now extubated her goal is home she has had [facility] in the past if needed. She however doesn't feel she will need it. Informed her we would see how she progresses in mobility.

## 2022-09-30 NOTE — PLAN OF CARE
Problem: Respiratory - Adult  Goal: Achieves optimal ventilation and oxygenation  Outcome ongoing    Flowsheets (Taken 9/29/2022 1435)  Achieves optimal ventilation and oxygenation:   Assess for changes in respiratory status   Position to facilitate oxygenation and minimize respiratory effort   Assess the need for suctioning and aspirate as needed   Respiratory therapy support as indicated   Assess for changes in mentation and behavior   Oxygen supplementation based on oxygen saturation or arterial blood gases

## 2022-09-30 NOTE — PROGRESS NOTES
22 154 lb 15.7 oz (70.3 kg)   22 157 lb (71.2 kg)   22 157 lb (71.2 kg)       Body Mass Index : Body mass index is 23.57 kg/m². Tmax over 24 hours: Temp (24hrs), Av.2 °F (37.3 °C), Min:98.1 °F (36.7 °C), Max:100.6 °F (38.1 °C)      Ins/Outs: In: 4702.8 [I.V.:3493.3; NG/GT:240]  Out: 7030 [Urine:1398]    PHYSICAL EXAM:  Constitutional: Appears well, no distress  EENT: PERRLA, EOMI,  no lesions, neck supple with midline trachea.  Intubated  Neck: Supple, symmetrical, trachea midline  Respiratory: mechanical breath sounds, diminished bilaterally  Cardiovascular: regular rate and rhythm, normal S1, S2, no murmur noted and 2+ pulses throughout  Abdomen: soft, nontender, nondistended, no masses or organomegaly  Extremities:  peripheral pulses normal, no pedal edema, no clubbing or cyanosis    MEDICATIONS:  Scheduled Meds:   aspirin  81 mg Oral Daily    gabapentin  100 mg Oral TID    lisinopril  5 mg Oral Daily    enoxaparin  40 mg SubCUTAneous Daily    cefTRIAXone (ROCEPHIN) IV  1,000 mg IntraVENous Q24H    And    azithromycin  500 mg IntraVENous Q24H    sodium chloride flush  5-40 mL IntraVENous 2 times per day    sennosides-docusate sodium  1 tablet Oral BID    famotidine (PEPCID) injection  20 mg IntraVENous BID       Continuous Infusions:   sodium chloride      fentaNYL 50 mcg/mL 100 mcg/hr (22)    propofol 45 mcg/kg/min (22)    sodium chloride 125 mL/hr at 22       PRN Meds:   sodium chloride, , PRN  acetaminophen, 650 mg, Q6H PRN   Or  acetaminophen, 650 mg, Q6H PRN  sodium chloride flush, 5-40 mL, PRN  ondansetron, 4 mg, Q8H PRN   Or  ondansetron, 4 mg, Q6H PRN  polyethylene glycol, 17 g, Daily PRN      SUPPORT DEVICES: [x] Ventilator [] BIPAP  [] Nasal Cannula [] Room Air    VENT SETTINGS (Comprehensive) (if applicable):    Vent Information  Ventilator ID: [id number]  Equipment Changed: HME, Expiratory Filter  Additional Respiratory Assessments  Heart Rate: 67  Resp: 17  SpO2: 97 %  End Tidal CO2: 32 (%)  Position: Semi-Castro's  Humidification Source: HME  Cuff Pressure (cm H2O):  (mlt)  Lab Results   Component Value Date/Time    MODE [facility] 09/30/2022 04:17 AM           DATA:  Complete Blood Count:   Recent Labs     09/28/22 1745 09/29/22  0852 09/30/22 0311   WBC 13.8* 12.3* 10.4   RBC 3.90* 3.39* 3.12*   HGB 11.8* 10.4* 9.4*   HCT 35.4* 31.3* 29.0*   MCV 90.8 92.3 92.9   MCH 30.3 30.7 30.1   MCHC 33.3 33.2 32.4   RDW 14.2 14.2 14.3    See Reflexed IPF Result 142   MPV 11.7  --  12.1        Last 3 Blood Glucose:   Recent Labs     09/28/22 1745 09/29/22  0852 09/30/22  0311   GLUCOSE 112* 93 78        PT/INR:    Lab Results   Component Value Date/Time    PROTIME 12.7 09/29/2022 04:17 AM    INR 1.3 09/29/2022 04:17 AM     PTT:    Lab Results   Component Value Date/Time    APTT 32.1 09/28/2022 05:45 PM       Basic Metabolic Profile:   Recent Labs     09/28/22 1745 09/29/22  0852 09/30/22 0311   * 134* 137   K 3.3* 3.7 3.8   CL 98 103 107   CO2 21 22 19*   BUN 9 9 7   CREATININE 0.68 0.39* 0.37*   GLUCOSE 112* 93 78       Liver Function:  Recent Labs     09/28/22 1745   PROT 6.9   LABALBU 3.5   ALT 6   AST 14   ALKPHOS 55   BILITOT 0.9       Magnesium: No results found for: MG  Phosphorus: No results found for: PHOS  Ionized Calcium: No results found for: CAION     Urinalysis:   Lab Results   Component Value Date/Time    NITRU NEGATIVE 09/28/2022 09:52 PM    COLORU Yellow 09/28/2022 09:52 PM    PHUR 7.5 09/28/2022 09:52 PM    WBCUA None 09/28/2022 09:52 PM    RBCUA 0 TO 2 09/28/2022 09:52 PM    MUCUS NOT REPORTED 05/01/2012 04:35 AM    TRICHOMONAS NOT REPORTED 05/01/2012 04:35 AM    YEAST NOT REPORTED 05/01/2012 04:35 AM    BACTERIA None 03/21/2022 10:17 AM    SPECGRAV 1.005 09/28/2022 09:52 PM    LEUKOCYTESUR NEGATIVE 09/28/2022 09:52 PM    UROBILINOGEN Normal 09/28/2022 09:52 PM    BILIRUBINUR NEGATIVE 09/28/2022 09:52 PM    BILIRUBINUR NEGATIVE 05/01/2012 04:35 AM    GLUCOSEU NEGATIVE 09/28/2022 09:52 PM    GLUCOSEU NEGATIVE 05/01/2012 04:35 AM    KETUA NEGATIVE 09/28/2022 09:52 PM    AMORPHOUS NOT REPORTED 05/01/2012 04:35 AM       HgBA1c:    Lab Results   Component Value Date/Time    LABA1C 5.1 03/21/2022 09:14 AM     TSH:    Lab Results   Component Value Date/Time    TSH 1.73 03/21/2022 09:14 AM     Lactic Acid: No results found for: LACTA   Troponin: No results for input(s): TROPONINI in the last 72 hours. Microbiology:  Urine Culture:  No components found for: CURINE  Blood Culture:  No components found for: CBLOOD, CFUNGUSBL  Sputum Culture:  No components found for: CSPUTUM    Other Labs:  Results for orders placed or performed during the hospital encounter of 09/28/22   Culture, Blood 1    Specimen: Blood   Result Value Ref Range    Specimen Description . BLOOD     Special Requests  L AC 10ML     Culture NO GROWTH 1 DAY    Culture, Blood 1    Specimen: Blood   Result Value Ref Range    Specimen Description . BLOOD     Special Requests  r forearm 10ml     Culture NO GROWTH 1 DAY    Culture, Urine    Specimen: Urine, clean catch   Result Value Ref Range    Specimen Description . CLEAN CATCH URINE     Culture NO SIGNIFICANT GROWTH    COVID-19, Rapid    Specimen: Nasopharyngeal Swab   Result Value Ref Range    Specimen Description . NASOPHARYNGEAL SWAB     SARS-CoV-2, Rapid Not Detected Not Detected   Culture, Respiratory    Specimen: Endotracheal   Result Value Ref Range    Specimen Description . ENDOTRACHEAL     Direct Exam NO EPITHELIAL CELLS SEEN     Direct Exam >25 NEUTROPHILS/LPF     Direct Exam FEW GRAM POSITIVE COCCI IN PAIRS (A)     Culture PENDING    MRSA DNA Probe, Nasal    Specimen: Nasal   Result Value Ref Range    Specimen Description . NASAL SWAB     MRSA, DNA, Nasal NEGATIVE NEGATIVE   Culture, Blood 1    Specimen: Blood   Result Value Ref Range    Specimen Description . BLOOD     Special Requests LT FA 4ML     Culture NO GROWTH 1 DAY Sputum gram stain    Specimen: Endotracheal   Result Value Ref Range    Specimen Description . ENDOTRACHEAL     Direct Exam DUPLICATE ORDER    CBC with Auto Differential   Result Value Ref Range    WBC 13.8 (H) 3.5 - 11.3 k/uL    RBC 3.90 (L) 3.95 - 5.11 m/uL    Hemoglobin 11.8 (L) 11.9 - 15.1 g/dL    Hematocrit 35.4 (L) 36.3 - 47.1 %    MCV 90.8 82.6 - 102.9 fL    MCH 30.3 25.2 - 33.5 pg    MCHC 33.3 28.4 - 34.8 g/dL    RDW 14.2 11.8 - 14.4 %    Platelets 923 093 - 368 k/uL    MPV 11.7 8.1 - 13.5 fL    NRBC Automated 0.0 0.0 per 100 WBC    Immature Granulocytes 2 (H) 0 %    Seg Neutrophils 86 (H) 36 - 66 %    Lymphocytes 6 (L) 24 - 44 %    Monocytes 6 1 - 7 %    Eosinophils % 0 (L) 1 - 4 %    Basophils 0 0 - 2 %    Absolute Immature Granulocyte 0.28 0.00 - 0.30 k/uL    Segs Absolute 11.86 (H) 1.8 - 7.7 k/uL    Absolute Lymph # 0.83 (L) 1.0 - 4.8 k/uL    Absolute Mono # 0.83 (H) 0.1 - 0.8 k/uL    Absolute Eos # 0.00 0.0 - 0.4 k/uL    Basophils Absolute 0.00 0.0 - 0.2 k/uL    Morphology INCREASED BANDS PRESENT    Comprehensive Metabolic Panel   Result Value Ref Range    Glucose 112 (H) 70 - 99 mg/dL    BUN 9 6 - 20 mg/dL    Creatinine 0.68 0.50 - 0.90 mg/dL    Calcium 8.3 (L) 8.6 - 10.4 mg/dL    Sodium 131 (L) 135 - 144 mmol/L    Potassium 3.3 (L) 3.7 - 5.3 mmol/L    Chloride 98 98 - 107 mmol/L    CO2 21 20 - 31 mmol/L    Anion Gap 12 9 - 17 mmol/L    Alkaline Phosphatase 55 35 - 104 U/L    ALT 6 5 - 33 U/L    AST 14 <32 U/L    Total Bilirubin 0.9 0.3 - 1.2 mg/dL    Total Protein 6.9 6.4 - 8.3 g/dL    Albumin 3.5 3.5 - 5.2 g/dL    Albumin/Globulin Ratio 1.0 1.0 - 2.5    GFR Non-African American >60 >60 mL/min    GFR African American >60 >60 mL/min    GFR Comment         Lactate, Sepsis   Result Value Ref Range    Lactic Acid, Sepsis, Whole Blood 1.3 0.5 - 1.9 mmol/L   Protime-INR   Result Value Ref Range    Protime 12.7 (H) 9.1 - 12.3 sec    INR 1.3    APTT   Result Value Ref Range    PTT 32.1 (H) 20.5 - 30.5 sec Troponin   Result Value Ref Range    Troponin, High Sensitivity <6 0 - 14 ng/L   Troponin   Result Value Ref Range    Troponin, High Sensitivity <6 0 - 14 ng/L   Urinalysis with Microscopic   Result Value Ref Range    Color, UA Yellow Yellow    Turbidity UA Clear Clear    Glucose, Ur NEGATIVE NEGATIVE    Bilirubin Urine NEGATIVE NEGATIVE    Ketones, Urine NEGATIVE NEGATIVE    Specific [place], UA 1.005 1.005 - 1.030    Urine Hgb NEGATIVE NEGATIVE    pH, UA 7.5 5.0 - 8.0    Protein, UA NEGATIVE NEGATIVE    Urobilinogen, Urine Normal Normal    Nitrite, Urine NEGATIVE NEGATIVE    Leukocyte Esterase, Urine NEGATIVE NEGATIVE    WBC, UA None 0 - 5 /HPF    RBC, UA 0 TO 2 0 - 4 /HPF    Epithelial Cells UA None 0 - 5 /HPF   Lactic Acid   Result Value Ref Range    Lactic Acid, Whole Blood 1.2 0.7 - 2.1 mmol/L   HCG Qualitative, Serum   Result Value Ref Range    hCG Qual NEGATIVE NEGATIVE   Brain Natriuretic Peptide   Result Value Ref Range    Pro- <300 pg/mL   SODIUM (POC)   Result Value Ref Range    POC Sodium 136 (L) 138 - 146 mmol/L   POTASSIUM (POC)   Result Value Ref Range    POC Potassium 3.2 (L) 3.5 - 4.5 mmol/L   CHLORIDE (POC)   Result Value Ref Range    POC Chloride 99 98 - 107 mmol/L   CALCIUM, IONIC (POC)   Result Value Ref Range    POC Ionized Calcium 1.08 (L) 1.15 - 1.33 mmol/L   BLOOD GAS, VENOUS   Result Value Ref Range    pH, Vipul 7.415 7.320 - 7.420    pCO2, Vipul 38.0 (L) 39 - 55 mm Hg    pO2, Vipul 48.8 30 - 50 mm Hg    HCO3, Venous 23.9 (L) 24 - 30 mmol/L    Positive Base Excess, Vipul 0.0 0.0 - 2.0 mmol/L    O2 Sat, Vipul 82.3 60.0 - 85.0 %    Carboxyhemoglobin 1.7 0 - 5 %    Pt Temp 37.0     FIO2 INFORMATION NOT PROVIDED    Lactate, Sepsis   Result Value Ref Range    Lactic Acid, Sepsis, Whole Blood 2.0 (H) 0.5 - 1.9 mmol/L   Lactate, Sepsis   Result Value Ref Range    Lactic Acid, Sepsis, Whole Blood 4.2 (H) 0.5 - 1.9 mmol/L   Protime-INR   Result Value Ref Range    Protime 12.7 (H) 9.1 - 12.3 sec INR 1.3    Basic Metabolic Panel w/ Reflex to MG   Result Value Ref Range    Glucose 93 70 - 99 mg/dL    BUN 9 6 - 20 mg/dL    Creatinine 0.39 (L) 0.50 - 0.90 mg/dL    Calcium 7.8 (L) 8.6 - 10.4 mg/dL    Sodium 134 (L) 135 - 144 mmol/L    Potassium 3.7 3.7 - 5.3 mmol/L    Chloride 103 98 - 107 mmol/L    CO2 22 20 - 31 mmol/L    Anion Gap 9 9 - 17 mmol/L    GFR Non-African American >60 >60 mL/min    GFR African American >60 >60 mL/min    GFR Comment         CBC with Auto Differential   Result Value Ref Range    WBC 12.3 (H) 3.5 - 11.3 k/uL    RBC 3.39 (L) 3.95 - 5.11 m/uL    Hemoglobin 10.4 (L) 11.9 - 15.1 g/dL    Hematocrit 31.3 (L) 36.3 - 47.1 %    MCV 92.3 82.6 - 102.9 fL    MCH 30.7 25.2 - 33.5 pg    MCHC 33.2 28.4 - 34.8 g/dL    RDW 14.2 11.8 - 14.4 %    Platelets See Reflexed IPF Result 138 - 453 k/uL    NRBC Automated 0.0 0.0 per 100 WBC    Immature Granulocytes 1 (H) 0 %    Seg Neutrophils 87 (H) 36 - 66 %    Lymphocytes 8 (L) 24 - 44 %    Monocytes 3 1 - 7 %    Eosinophils % 0 (L) 1 - 4 %    Basophils 1 0 - 2 %    Absolute Immature Granulocyte 0.12 0.00 - 0.30 k/uL    Segs Absolute 10.71 (H) 1.8 - 7.7 k/uL    Absolute Lymph # 0.98 (L) 1.0 - 4.8 k/uL    Absolute Mono # 0.37 0.1 - 0.8 k/uL    Absolute Eos # 0.00 0.0 - 0.4 k/uL    Basophils Absolute 0.12 0.0 - 0.2 k/uL    Morphology Normal    Immature Platelet Fraction   Result Value Ref Range    Platelet, Immature Fraction 6.3 1.1 - 10.3 %    Platelet, Fluorescence 127 (L) 138 - 453 k/uL   Basic Metabolic Panel w/ Reflex to MG   Result Value Ref Range    Glucose 78 70 - 99 mg/dL    BUN 7 6 - 20 mg/dL    Creatinine 0.37 (L) 0.50 - 0.90 mg/dL    Calcium 7.7 (L) 8.6 - 10.4 mg/dL    Sodium 137 135 - 144 mmol/L    Potassium 3.8 3.7 - 5.3 mmol/L    Chloride 107 98 - 107 mmol/L    CO2 19 (L) 20 - 31 mmol/L    Anion Gap 11 9 - 17 mmol/L    GFR Non-African American >60 >60 mL/min    GFR African American >60 >60 mL/min    GFR Comment         CBC with Auto Differential Result Value Ref Range    WBC 10.4 3.5 - 11.3 k/uL    RBC 3.12 (L) 3.95 - 5.11 m/uL    Hemoglobin 9.4 (L) 11.9 - 15.1 g/dL    Hematocrit 29.0 (L) 36.3 - 47.1 %    MCV 92.9 82.6 - 102.9 fL    MCH 30.1 25.2 - 33.5 pg    MCHC 32.4 28.4 - 34.8 g/dL    RDW 14.3 11.8 - 14.4 %    Platelets 145 247 - 851 k/uL    MPV 12.1 8.1 - 13.5 fL    NRBC Automated 0.0 0.0 per 100 WBC    Immature Granulocytes 0 0 %    Seg Neutrophils 87 (H) 36 - 66 %    Lymphocytes 8 (L) 24 - 44 %    Monocytes 5 1 - 7 %    Eosinophils % 0 (L) 1 - 4 %    Basophils 0 0 - 2 %    Absolute Immature Granulocyte 0.00 0.00 - 0.30 k/uL    Segs Absolute 9.05 (H) 1.8 - 7.7 k/uL    Absolute Lymph # 0.83 (L) 1.0 - 4.8 k/uL    Absolute Mono # 0.52 0.1 - 0.8 k/uL    Absolute Eos # 0.00 0.0 - 0.4 k/uL    Basophils Absolute 0.00 0.0 - 0.2 k/uL    Morphology Normal    Venous Blood Gas, POC   Result Value Ref Range    pH, Vipul 7.402 7.320 - 7.430    pCO2, Vipul 40.1 (L) 41.0 - 51.0 mm Hg    pO2, Vipul 30.4 30.0 - 50.0 mm Hg    HCO3, Venous 24.9 22.0 - 29.0 mmol/L    Positive Base Excess, Vipul 0 0.0 - 3.0    O2 Sat, Vipul 58 (L) 60.0 - 85.0 %   Anion Gap (Calc) POC   Result Value Ref Range    Anion Gap 12 7 - 16 mmol/L   Arterial Blood Gas, POC   Result Value Ref Range    POC pH 7.381 7.350 - 7.450    POC pCO2 42.1 35.0 - 48.0 mm Hg    POC PO2 79.2 (L) 83.0 - 108.0 mm Hg    POC HCO3 25.0 21.0 - 28.0 mmol/L    Positive Base Excess, Art 0 0.0 - 3.0    POC O2 SAT 95 94.0 - 98.0 %    O2 Device/Flow/% Adult Ventilator     Nacho Test POSITIVE     Sample Site Arterial Line     Mode PRVC     FIO2 100.0    POCT Glucose   Result Value Ref Range    POC Glucose 97 74 - 100 mg/dL   Arterial Blood Gas, POC   Result Value Ref Range    POC pH 7.441 7.350 - 7.450    POC pCO2 35.6 35.0 - 48.0 mm Hg    POC PO2 121.2 (H) 83.0 - 108.0 mm Hg    POC HCO3 24.2 21.0 - 28.0 mmol/L    Positive Base Excess, Art 0 0.0 - 3.0    POC O2 SAT 99 (H) 94.0 - 98.0 %    O2 Device/Flow/% Adult Ventilator     [facility] Test POSITIVE     Sample Site Arterial Line     Mode PRVC     FIO2 100.0    POCT Glucose   Result Value Ref Range    POC Glucose 87 74 - 100 mg/dL   Arterial Blood Gas, POC   Result Value Ref Range    POC pH 7.362 7.350 - 7.450    POC pCO2 39.2 35.0 - 48.0 mm Hg    POC PO2 77.1 (L) 83.0 - 108.0 mm Hg    POC HCO3 22.2 21.0 - 28.0 mmol/L    Negative Base Excess, Art 3 (H) 0.0 - 2.0    POC O2 SAT 95 94.0 - 98.0 %    O2 Device/Flow/% Adult Ventilator     Nacho Test POSITIVE     Sample Site Left Radial Artery     Mode PRVC     FIO2 40.0    Lactic Acid, POC   Result Value Ref Range    POC Lactic Acid 0.66 0.56 - 1.39 mmol/L   POCT Glucose   Result Value Ref Range    POC Glucose 79 74 - 100 mg/dL   EKG 12 Lead   Result Value Ref Range    Ventricular Rate 90 BPM    Atrial Rate 90 BPM    P-R Interval 140 ms    QRS Duration 82 ms    Q-T Interval 354 ms    QTc Calculation (Bazett) 433 ms    P Axis 60 degrees    R Axis 52 degrees    T Axis 69 degrees       Radiology/Imaging:  XR CHEST PORTABLE   Final Result   Similar bibasilar opacities, greater on the right. XR ABDOMEN FOR NG/OG/NE TUBE PLACEMENT   Final Result   The enteric catheter tip terminates in the region of the proximal to mid   gastric body junction. XR CHEST PORTABLE   Final Result   ET tube measures 5.8 cm above the lila. Right basilar pneumonia, with mild increased lung expansion and improved   aeration at the right lung base. New linear airspace disease at the left lung base likely representing   atelectasis. CT CHEST PULMONARY EMBOLISM W CONTRAST   Final Result   No evidence of pulmonary embolism or dissection. Dense consolidation within the lower lobes bilaterally, as well as in the   right middle lobe, most compatible with severe lobar pneumonia. Aspiration   would also be a consideration. This process must be followed to resolution to ensure that there is no   underlying neoplasm.          XR CHEST PORTABLE Final Result      Focal consolidative changes of right middle lobe and right lower lobe. Findings are likely suggestive of pneumonia.          XR CHEST PORTABLE    (Results Pending)   XR CHEST PORTABLE    (Results Pending)       ASSESSMENT:     Patient Active Problem List    Diagnosis Date Noted    Tachypnea 09/30/2022    Acute respiratory failure with hypoxia ([address] 09/30/2022    Sepsis ([address] 09/28/2022    Pneumonia due to infectious organism 09/28/2022    Hypokalemia 09/28/2022    Pneumonia 09/28/2022    Chronic midline low back pain with right-sided sciatica 05/24/2022    Chronic neck pain 05/24/2022    Protein malnutrition ([address] 03/24/2022    Vitamin D deficiency 03/24/2022    Opioid dependence on agonist therapy ([address] 03/24/2022    Peripheral edema 03/24/2022    H/O bilateral salpingectomy 03/24/2022    Chronic allergic rhinitis 11/22/2021    Pre-eclampsia in third trimester 06/23/2021    Maternal antithrombin III deficiency complicating pregnancy ([address] 03/08/2021    H/O opioid abuse ([address] 02/19/2021    Advanced maternal age in multigravida 02/10/2021    Dichorionic diamniotic twin pregnancy, antepartum 02/10/2021    Hx of preeclampsia, prior pregnancy, currently pregnant 02/10/2021    Degeneration of cervical intervertebral disc 12/21/2020    Gastro-esophageal reflux disease without esophagitis 12/21/2020    Generalized anxiety disorder 12/21/2020    Essential hypertension 12/21/2020    Opioid use, unspecified with other opioid-induced disorder (HCC)(Suboxone) 12/21/2020    History of opiate therapy 12/21/2020    Smoker 12/21/2020    Family history of colon cancer 12/21/2020    Family history of breast cancer in mother 12/21/2020    Family history of early CAD 12/21/2020    Lumbar radiculopathy, chronic 02/27/2013    Fatigue 12/05/2012    Depression 12/05/2012        PLAN:       PLAN/MEDICAL DECISION MAKING:  Neurologic:   Neuro intact  Neuro checks per protocol  propofol and fentanyl  Will d/c propofol  Alert and following commands  Cardiovascular:  Hemodynamically stable  MAP goal 65  Pulmonary:  Maintain oxygen sats >92%  Pulmonary toilet  CPAP, if tolerates will extubate  Vent Information  Ventilator ID: [id number]  Equipment Changed: HME, Expiratory Filter  GI/Nutrition  sennakot 2 tabs in AM  Ulcer Prophylaxis: PPI (pepcid)  Diet:No diet orders on file  Renal/Fluid/Electrolyte  IV Fluids: 0.9NS @ 125 mL/Hr   I/O: In: 4702.8 [I.V.:3493.3; NG/GT:240]  Out: 4299 [Urine:1398]  UOP: 0.8 cc/kg/hr  Monitor electrolytes, replace PRN   ID  WBC:   Lab Results   Component Value Date    WBC 10.4 2022     Tmax: Temp (24hrs), Av.2 °F (37.3 °C), Min:98.1 °F (36.7 °C), Max:100.6 °F (38.1 °C)    Rpp pending  Antimicrobials: ceftriaxone and azithromycin  Hematology:  Recent Labs     22  1745 22  0852 22  0311   HGB 11.8* 10.4* 9.4*    trending down  Endocrine:   glucose controlled - most recent BGL is   Recent Labs     22  1745 22  0852 22  0311   GLUCOSE 112* 93 78     DVT Prophylaxis  lovenox  Discharge Needs:  PT, OT, ST, SW, and Case Management      CODE STATUS: Full Code      DISPOSITION:  [x] To remain ICU: Intubated   [] OK for out of ICU from [facility], [place]  Emergency Medicine [address]  2022, 7:17 AM EDT

## 2022-09-30 NOTE — PLAN OF CARE
Problem: Respiratory - Adult  Goal: Achieves optimal ventilation and oxygenation  9/30/2022 0243 by Kate Garner RN  Outcome: Progressing  9/29/2022 2338 by Janet De La Paz RCP  Flowsheets (Taken 9/29/2022 2338)  Achieves optimal ventilation and oxygenation:   Assess for changes in respiratory status   Position to facilitate oxygenation and minimize respiratory effort   Assess the need for suctioning and aspirate as needed   Respiratory therapy support as indicated   Assess for changes in mentation and behavior   Oxygen supplementation based on oxygen saturation or arterial blood gases     Problem: Safety - Medical Restraint  Goal: Remains free of injury from restraints (Restraint for Interference with Medical Device)  Description: INTERVENTIONS:  1. Determine that other, less restrictive measures have been tried or would not be effective before applying the restraint  2. Evaluate the patient's condition at the time of restraint application  3. Inform patient/family regarding the reason for restraint  4. Q2H: Monitor safety, psychosocial status, comfort, nutrition and hydration  Outcome: Progressing     Problem: Discharge Planning  Goal: Discharge to home or other facility with appropriate resources  Outcome: Progressing     Problem: Pain  Goal: Verbalizes/displays adequate comfort level or baseline comfort level  Outcome: Progressing     Problem: Skin/Tissue Integrity  Goal: Absence of new skin breakdown  Description: 1. Monitor for areas of redness and/or skin breakdown  2. Assess vascular access sites hourly  3. Every 4-6 hours minimum:  Change oxygen saturation probe site  4. Every 4-6 hours:  If on nasal continuous positive airway pressure, respiratory therapy assess nares and determine need for appliance change or resting period.   Outcome: Progressing     Problem: Safety - Adult  Goal: Free from fall injury  Outcome: Progressing     Problem: ABCDS Injury Assessment  Goal: Absence of physical injury  Outcome: Progressing

## 2022-10-01 ENCOUNTER — APPOINTMENT (OUTPATIENT)
Dept: GENERAL RADIOLOGY | Age: 42
DRG: 720 | End: 2022-10-01
Payer: COMMERCIAL

## 2022-10-01 LAB
ABSOLUTE EOS #: 0.1 K/UL (ref 0–0.44)
ABSOLUTE IMMATURE GRANULOCYTE: 0.1 K/UL (ref 0–0.3)
ABSOLUTE LYMPH #: 0.7 K/UL (ref 1.1–3.7)
ABSOLUTE MONO #: 0.7 K/UL (ref 0.1–1.2)
ANION GAP SERPL CALCULATED.3IONS-SCNC: 9 MMOL/L (ref 9–17)
BASOPHILS # BLD: 0 % (ref 0–2)
BASOPHILS ABSOLUTE: 0 K/UL (ref 0–0.2)
BUN BLDV-MCNC: 3 MG/DL (ref 6–20)
CALCIUM SERPL-MCNC: 8.1 MG/DL (ref 8.6–10.4)
CHLORIDE BLD-SCNC: 107 MMOL/L (ref 98–107)
CO2: 24 MMOL/L (ref 20–31)
CREAT SERPL-MCNC: 0.31 MG/DL (ref 0.5–0.9)
EOSINOPHILS RELATIVE PERCENT: 1 % (ref 1–4)
GFR AFRICAN AMERICAN: >60 ML/MIN
GFR NON-AFRICAN AMERICAN: >60 ML/MIN
GFR SERPL CREATININE-BSD FRML MDRD: ABNORMAL ML/MIN/{1.73_M2}
GLUCOSE BLD-MCNC: 104 MG/DL (ref 70–99)
HCT VFR BLD CALC: 29.7 % (ref 36.3–47.1)
HEMOGLOBIN: 9.7 G/DL (ref 11.9–15.1)
IMMATURE GRANULOCYTES: 1 %
LYMPHOCYTES # BLD: 7 % (ref 24–43)
MAGNESIUM: 1.6 MG/DL (ref 1.6–2.6)
MCH RBC QN AUTO: 30.2 PG (ref 25.2–33.5)
MCHC RBC AUTO-ENTMCNC: 32.7 G/DL (ref 28.4–34.8)
MCV RBC AUTO: 92.5 FL (ref 82.6–102.9)
MONOCYTES # BLD: 7 % (ref 3–12)
MORPHOLOGY: NORMAL
NRBC AUTOMATED: 0 PER 100 WBC
PDW BLD-RTO: 14.1 % (ref 11.8–14.4)
PLATELET # BLD: 156 K/UL (ref 138–453)
PMV BLD AUTO: 12 FL (ref 8.1–13.5)
POTASSIUM SERPL-SCNC: 3.2 MMOL/L (ref 3.7–5.3)
RBC # BLD: 3.21 M/UL (ref 3.95–5.11)
SEG NEUTROPHILS: 84 % (ref 36–65)
SEGMENTED NEUTROPHILS ABSOLUTE COUNT: 8.4 K/UL (ref 1.5–8.1)
SODIUM BLD-SCNC: 140 MMOL/L (ref 135–144)
WBC # BLD: 10 K/UL (ref 3.5–11.3)

## 2022-10-01 PROCEDURE — 6370000000 HC RX 637 (ALT 250 FOR IP): Performed by: STUDENT IN AN ORGANIZED HEALTH CARE EDUCATION/TRAINING PROGRAM

## 2022-10-01 PROCEDURE — 2580000003 HC RX 258: Performed by: STUDENT IN AN ORGANIZED HEALTH CARE EDUCATION/TRAINING PROGRAM

## 2022-10-01 PROCEDURE — 85025 COMPLETE CBC W/AUTO DIFF WBC: CPT

## 2022-10-01 PROCEDURE — 80048 BASIC METABOLIC PNL TOTAL CA: CPT

## 2022-10-01 PROCEDURE — 71045 X-RAY EXAM CHEST 1 VIEW: CPT

## 2022-10-01 PROCEDURE — 6360000002 HC RX W HCPCS: Performed by: NURSE PRACTITIONER

## 2022-10-01 PROCEDURE — 83735 ASSAY OF MAGNESIUM: CPT

## 2022-10-01 PROCEDURE — 6370000000 HC RX 637 (ALT 250 FOR IP): Performed by: NURSE PRACTITIONER

## 2022-10-01 PROCEDURE — 6360000002 HC RX W HCPCS: Performed by: STUDENT IN AN ORGANIZED HEALTH CARE EDUCATION/TRAINING PROGRAM

## 2022-10-01 PROCEDURE — 2000000000 HC ICU R&B

## 2022-10-01 PROCEDURE — 2500000003 HC RX 250 WO HCPCS: Performed by: STUDENT IN AN ORGANIZED HEALTH CARE EDUCATION/TRAINING PROGRAM

## 2022-10-01 PROCEDURE — 99291 CRITICAL CARE FIRST HOUR: CPT | Performed by: INTERNAL MEDICINE

## 2022-10-01 PROCEDURE — 36415 COLL VENOUS BLD VENIPUNCTURE: CPT

## 2022-10-01 PROCEDURE — 6370000000 HC RX 637 (ALT 250 FOR IP)

## 2022-10-01 RX ORDER — GUAIFENESIN 600 MG/1
600 TABLET, EXTENDED RELEASE ORAL 2 TIMES DAILY
Status: DISCONTINUED | OUTPATIENT
Start: 2022-10-01 | End: 2022-10-04 | Stop reason: HOSPADM

## 2022-10-01 RX ORDER — POTASSIUM CHLORIDE 20 MEQ/1
20 TABLET, EXTENDED RELEASE ORAL 2 TIMES DAILY WITH MEALS
Status: DISCONTINUED | OUTPATIENT
Start: 2022-10-01 | End: 2022-10-04

## 2022-10-01 RX ORDER — MAGNESIUM SULFATE IN WATER 40 MG/ML
2000 INJECTION, SOLUTION INTRAVENOUS ONCE
Status: COMPLETED | OUTPATIENT
Start: 2022-10-01 | End: 2022-10-01

## 2022-10-01 RX ADMIN — DOCUSATE SODIUM 50 MG AND SENNOSIDES 8.6 MG 1 TABLET: 8.6; 5 TABLET, FILM COATED ORAL at 08:15

## 2022-10-01 RX ADMIN — POTASSIUM CHLORIDE 20 MEQ: 1500 TABLET, EXTENDED RELEASE ORAL at 16:40

## 2022-10-01 RX ADMIN — GABAPENTIN 100 MG: 100 CAPSULE ORAL at 14:00

## 2022-10-01 RX ADMIN — BENZOCAINE AND MENTHOL 1 LOZENGE: 15; 3.6 LOZENGE ORAL at 18:48

## 2022-10-01 RX ADMIN — MAGNESIUM SULFATE HEPTAHYDRATE 2000 MG: 40 INJECTION, SOLUTION INTRAVENOUS at 06:28

## 2022-10-01 RX ADMIN — SODIUM CHLORIDE, PRESERVATIVE FREE 20 MG: 5 INJECTION INTRAVENOUS at 08:14

## 2022-10-01 RX ADMIN — ENOXAPARIN SODIUM 40 MG: 100 INJECTION SUBCUTANEOUS at 08:14

## 2022-10-01 RX ADMIN — PIPERACILLIN AND TAZOBACTAM 3375 MG: 3; .375 INJECTION, POWDER, FOR SOLUTION INTRAVENOUS at 10:37

## 2022-10-01 RX ADMIN — SODIUM CHLORIDE, PRESERVATIVE FREE 10 ML: 5 INJECTION INTRAVENOUS at 08:17

## 2022-10-01 RX ADMIN — ACETAMINOPHEN 650 MG: 325 TABLET ORAL at 01:26

## 2022-10-01 RX ADMIN — SODIUM CHLORIDE, PRESERVATIVE FREE 20 MG: 5 INJECTION INTRAVENOUS at 20:26

## 2022-10-01 RX ADMIN — PIPERACILLIN AND TAZOBACTAM 3375 MG: 3; .375 INJECTION, POWDER, FOR SOLUTION INTRAVENOUS at 18:11

## 2022-10-01 RX ADMIN — GUAIFENESIN 600 MG: 600 TABLET, EXTENDED RELEASE ORAL at 18:46

## 2022-10-01 RX ADMIN — ACETAMINOPHEN 650 MG: 325 TABLET ORAL at 20:25

## 2022-10-01 RX ADMIN — GABAPENTIN 100 MG: 100 CAPSULE ORAL at 08:15

## 2022-10-01 RX ADMIN — SODIUM CHLORIDE, PRESERVATIVE FREE 10 ML: 5 INJECTION INTRAVENOUS at 20:25

## 2022-10-01 RX ADMIN — BENZOCAINE AND MENTHOL 1 LOZENGE: 15; 3.6 LOZENGE ORAL at 06:31

## 2022-10-01 RX ADMIN — GABAPENTIN 100 MG: 100 CAPSULE ORAL at 20:25

## 2022-10-01 RX ADMIN — POTASSIUM CHLORIDE 20 MEQ: 1500 TABLET, EXTENDED RELEASE ORAL at 08:15

## 2022-10-01 RX ADMIN — ACETAMINOPHEN 650 MG: 325 TABLET ORAL at 14:00

## 2022-10-01 RX ADMIN — Medication 81 MG: at 08:14

## 2022-10-01 RX ADMIN — LISINOPRIL 5 MG: 5 TABLET ORAL at 08:15

## 2022-10-01 ASSESSMENT — PAIN DESCRIPTION - LOCATION
LOCATION: HEAD
LOCATION: THROAT

## 2022-10-01 ASSESSMENT — PAIN DESCRIPTION - DESCRIPTORS: DESCRIPTORS: POUNDING

## 2022-10-01 ASSESSMENT — PAIN DESCRIPTION - PAIN TYPE: TYPE: ACUTE PAIN

## 2022-10-01 ASSESSMENT — ENCOUNTER SYMPTOMS: TACHYPNEA: 1

## 2022-10-01 ASSESSMENT — PAIN SCALES - GENERAL
PAINLEVEL_OUTOF10: 3
PAINLEVEL_OUTOF10: 3

## 2022-10-01 ASSESSMENT — PAIN - FUNCTIONAL ASSESSMENT: PAIN_FUNCTIONAL_ASSESSMENT: ACTIVITIES ARE NOT PREVENTED

## 2022-10-01 NOTE — PROGRESS NOTES
Critical Care Team - Daily Progress Note      Date and time: 10/1/2022 11:26 AM  Patient's name:  [address] Record Number: [id number]  Patient's account/billing number: [de-identified]  Patient's YOB: 1980  Age: 39 y.o. Date of Admission: 9/28/2022  5:16 PM  Length of stay during current admission: 3      Primary Care Physician: TABITHA Anthony - CNP  ICU Attending Physician: Dr. Sam Smith Status: Full Code    Reason for ICU admission:   Chief Complaint   Patient presents with    Shortness of Breath         SUBJECTIVE:   HPI:  38 yo female PMH of opioid use and dependence on suboxone, HTN and anxiety. Presented to the ED with SOB for 3 days, along with fever, cough, congestion and body aches. She presented tachypnic and was desaturating to the low 80s. She was placed on NRB with improvement of her saturations and RR. She has a history of DVT provoked by pregnancy in the past, not on any AC at this time. CT PE was done, no PE but significant for severe lobar pneumonia in bilateral lower lobes and right middle lobe. COVID swab was negative. Labs showed mild hypokalemia, leukocytosis of 13.8 otherwise unremarkable. Started on azithromycin and rocephin for CAP. Patient had a worsening respiratory status while in the ED, was intubated due to hypoxia. Was initially sedated on propofol, versed and fentanyl. Hemodynamically stable. 9/29:intubated    Interval History:  During bedside examination, the patient was alert, oriented, and in no acute distress. The patient reported that she was feeling better than yesterday. The patient did report of cough and chest pain especially during inspiration. The patient was extubated on 9/30/2022 and currently is on 2 L nasal cannula oxygen. The patient did suffer from recurring fevers with temperature of 101.3 F last night. Her potassium and magnesium levels was replaced yesterday.     OVERNIGHT EVENTS:           AWAKE & FOLLOWING COMMANDS:  [] No   [x] Yes    CURRENT VENTILATION STATUS:     [] Ventilator  [] BIPAP  [x] Nasal Cannula [] Room Air      IF INTUBATED, ET TUBE MARKING AT LOWER LIP:       cms    SECRETIONS Amount:  [x] Small [] Moderate  [] Large  [] None  Color:     [x] White [] Colored  [] Bloody    SEDATION:  RAAS Score:  [] Propofol gtt  [] Versed gtt  [] Ativan gtt   [x] No Sedation    PARALYZED:  [x] No    [] Yes    DIARRHEA:                [x] No                [] Yes  (C. Difficile status: [] positive                                                                                                                       [] negative                                                                                                                     [] pending)    VASOPRESSORS:  [x] No    [] Yes    If yes -   [] Levophed       [] Dopamine     [] Vasopressin       [] Dobutamine  [] Phenylephrine         [] Epinephrine    CENTRAL LINES:     [x] No   [] Yes   (Date of Insertion:   )           If yes -     [] Right IJ     [] Left IJ [] Right Femoral [] Left Femoral                   [] Right Subclavian [] Left Subclavian       ESPINOZA'S CATHETER:   [] No   [x] Yes  (Date of Insertion: 2022)     URINE OUTPUT:            [x] Good   [] Low              [] Anuric      OBJECTIVE:     VITAL SIGNS:  BP (!) 143/100   Pulse 71   Temp 99.9 °F (37.7 °C) (Bladder)   Resp 26   Ht 5' 8\" (1.727 m)   Wt 154 lb 15.7 oz (70.3 kg)   SpO2 97%   BMI 23.57 kg/m²   Tmax over 24 hours:  Temp (24hrs), Av.3 °F (37.9 °C), Min:98.4 °F (36.9 °C), Max:101.8 °F (38.8 °C)      Patient Vitals for the past 8 hrs:   BP Temp Temp src Pulse Resp SpO2   10/01/22 1000 (!) 143/100 99.9 °F (37.7 °C) Bladder 71 26 97 %   10/01/22 0900 (!) 141/99 -- -- 74 27 95 %   10/01/22 0800 (!) 125/94 99.1 °F (37.3 °C) Bladder 65 24 98 %   10/01/22 0700 (!) 146/94 -- -- 69 26 98 %   10/01/22 0600 (!) 131/90 98.4 °F (36.9 °C) -- 63 23 97 %   10/01/22 0547 -- -- -- 68 25 96 % 10/01/22 0500 131/89 -- -- 61 21 100 %   10/01/22 0400 126/88 98.8 °F (37.1 °C) Bladder 63 19 100 %         Intake/Output Summary (Last 24 hours) at 10/1/2022 1126  Last data filed at 10/1/2022 0826  Gross per 24 hour   Intake 1213.5 ml   Output 2100 ml   Net -886.5 ml     Date 10/01/22 0000 - 10/01/22 2359   Shift 2600-0770 7760-0700 1090-4326 24 Hour Total   INTAKE   I.V.(mL/kg)  95.7(1.4)  95.7(1.4)   IV Piggyback(mL/kg)  398.9(5.7)  398.9(5.7)   Shift Total(mL/kg)  494. 6(7)  494. 6(7)   OUTPUT   Urine(mL/kg/hr) 900(1.6) 125  1025   Shift Total(mL/kg) 900(12.8) 125(1.8)  1025(14.6)   Weight (kg) 70.3 70.3 70.3 70.3     Wt Readings from Last 3 Encounters:   09/30/22 154 lb 15.7 oz (70.3 kg)   09/01/22 157 lb (71.2 kg)   07/25/22 157 lb (71.2 kg)     Body mass index is 23.57 kg/m². PHYSICAL EXAM:  Constitutional: Appears well, no distress  EENT: PERRLA, EOMI, sclera clear, anicteric, oropharynx clear, no lesions, neck supple with midline trachea. Neck: Supple, symmetrical, trachea midline  Respiratory: Inspiratory wheezing noted during chest auscultation. Cardiovascular: regular rate and rhythm, normal S1, S2, no murmur noted and 2+ pulses throughout  Abdomen: soft, nontender, nondistended, no masses or organomegaly  NEUROLOGIC: Awake, alert, oriented to name, place and time. Cranial nerves II-XII are grossly intact.    Extremities:  peripheral pulses normal, no pedal edema, no clubbing or cyanosis  SKIN: normal coloration and turgor    Any additional physical findings:      MEDICATIONS:  Scheduled Meds:   potassium chloride  20 mEq Oral BID WC    piperacillin-tazobactam  3,375 mg IntraVENous Q8H    aspirin  81 mg Oral Daily    gabapentin  100 mg Oral TID    lisinopril  5 mg Oral Daily    enoxaparin  40 mg SubCUTAneous Daily    sodium chloride flush  5-40 mL IntraVENous 2 times per day    sennosides-docusate sodium  1 tablet Oral BID    famotidine (PEPCID) injection  20 mg IntraVENous BID     Continuous Infusions:   sodium chloride Stopped (10/01/22 2195)    fentaNYL 50 mcg/mL Stopped (09/30/22 1212)    propofol Stopped (09/30/22 1111)    sodium chloride Stopped (09/30/22 0922)     PRN Meds:   benzocaine-menthol, 1 lozenge, Q2H PRN  sodium chloride, , PRN  acetaminophen, 650 mg, Q6H PRN   Or  acetaminophen, 650 mg, Q6H PRN  sodium chloride flush, 5-40 mL, PRN  ondansetron, 4 mg, Q8H PRN   Or  ondansetron, 4 mg, Q6H PRN  polyethylene glycol, 17 g, Daily PRN        SUPPORT DEVICES: [] Ventilator [] BIPAP  [x] Nasal Cannula [] Room Air    Vent Information  Ventilator ID: [id number]  Equipment Changed: HME  Additional Respiratory Assessments  Heart Rate: 71  Resp: 26  SpO2: 97 %  End Tidal CO2: 32 (%)  Position: Semi-Castro's  Humidification Source: HME  Cuff Pressure (cm H2O):  (mlt)      Lab Results   Component Value Date/Time    FIO2 40.0 09/30/2022 04:17 AM     Lactic Acid: No results found for: LACTA      DATA:  Complete Blood Count:   Recent Labs     09/28/22  1745 09/29/22  0852 09/30/22  0311 10/01/22  0352   WBC 13.8* 12.3* 10.4 10.0   HGB 11.8* 10.4* 9.4* 9.7*   MCV 90.8 92.3 92.9 92.5    See Reflexed IPF Result 142 156   RBC 3.90* 3.39* 3.12* 3.21*   HCT 35.4* 31.3* 29.0* 29.7*   MCH 30.3 30.7 30.1 30.2   MCHC 33.3 33.2 32.4 32.7   RDW 14.2 14.2 14.3 14.1   MPV 11.7  --  12.1 12.0        PT/INR:    Lab Results   Component Value Date/Time    PROTIME 12.7 09/29/2022 04:17 AM    INR 1.3 09/29/2022 04:17 AM     PTT:    Lab Results   Component Value Date/Time    APTT 32.1 09/28/2022 05:45 PM       Basal Metabolic Profile:   Recent Labs     09/29/22  0852 09/30/22 0311 10/01/22  0352   * 137 140   K 3.7 3.8 3.2*   BUN 9 7 3*   CREATININE 0.39* 0.37* 0.31*    107 107   CO2 22 19* 24      Magnesium:   Lab Results   Component Value Date/Time    MG 1.6 10/01/2022 03:52 AM     Phosphorus: No results found for: PHOS  S. Calcium:  Recent Labs     10/01/22  0352   CALCIUM 8.1*     S.  Ionized Calcium:No results for input(s): IONCA in the last 72 hours. Urinalysis:   Lab Results   Component Value Date/Time    NITRU NEGATIVE 09/28/2022 09:52 PM    COLORU Yellow 09/28/2022 09:52 PM    PHUR 7.5 09/28/2022 09:52 PM    WBCUA None 09/28/2022 09:52 PM    RBCUA 0 TO 2 09/28/2022 09:52 PM    MUCUS NOT REPORTED 05/01/2012 04:35 AM    TRICHOMONAS NOT REPORTED 05/01/2012 04:35 AM    YEAST NOT REPORTED 05/01/2012 04:35 AM    BACTERIA None 03/21/2022 10:17 AM    SPECGRAV 1.005 09/28/2022 09:52 PM    LEUKOCYTESUR NEGATIVE 09/28/2022 09:52 PM    UROBILINOGEN Normal 09/28/2022 09:52 PM    BILIRUBINUR NEGATIVE 09/28/2022 09:52 PM    BILIRUBINUR NEGATIVE 05/01/2012 04:35 AM    GLUCOSEU NEGATIVE 09/28/2022 09:52 PM    GLUCOSEU NEGATIVE 05/01/2012 04:35 AM    KETUA NEGATIVE 09/28/2022 09:52 PM    AMORPHOUS NOT REPORTED 05/01/2012 04:35 AM       CARDIAC ENZYMES: No results for input(s): CKMB, CKMBINDEX, TROPONINI in the last 72 hours. Invalid input(s): CKTOTAL;3  BNP: No results for input(s): BNP in the last 72 hours. LFTS  Recent Labs     09/28/22  1745   ALKPHOS 55   ALT 6   AST 14   BILITOT 0.9   LABALBU 3.5       AMYLASE/LIPASE/AMMONIA  No results for input(s): AMYLASE, LIPASE, AMMONIA in the last 72 hours. Last 3 Blood Glucose:   Recent Labs     09/28/22  1745 09/29/22  0852 09/30/22  0311 10/01/22  0352   GLUCOSE 112* 93 78 104*      HgBA1c:    Lab Results   Component Value Date/Time    LABA1C 5.1 03/21/2022 09:14 AM         TSH:    Lab Results   Component Value Date/Time    TSH 1.73 03/21/2022 09:14 AM     ANEMIA STUDIES  No results for input(s): LABIRON, TIBC, FERRITIN, [id number], FOLATE, OCCULTBLD in the last 72 hours.             Cultures during this admission:     Blood cultures:                 [] None drawn      [x] Negative             []  Positive (Details:  )  Urine Culture:                   [] None drawn      [x] Negative             []  Positive (Details:  )  Sputum Culture:               [x] None drawn [] Negative             []  Positive (Details:  )   Endotracheal aspirate:     [] None drawn       [] Negative             [x]  Positive (Details: Few gram positive cocci in pairs )             Chest Xray (10/1/2022): Impression   1. Apparent increase in airspace opacity in the mid to basilar right lung   likely due in part to passive atelectasis given apparent increase in   overlying and possibly loculated trace to small right pleural effusion. However, the appearance may be related to differences in layering of the   effusion. Superimposed pneumonia or aspiration may persist.   2. No significant change in left basilar atelectasis, pneumonia, and/or   aspiration. 3. Pulmonary vascular congestion and mild cardiomegaly. ASSESSMENT:     Principal Problem:    Sepsis ([address]  Active Problems:    Pneumonia due to infectious organism    Hypokalemia    Pneumonia    Tachypnea    Acute respiratory failure with hypoxia ([address]    Essential hypertension  Resolved Problems:    * No resolved hospital problems. *       PLAN:     PLAN/MEDICAL DECISION MAKING:  Neurologic:   Neuro intact  Neuro checks per protocol  no sedation    Cardiovascular:  Hemodynamically stable  MAP goal >65  Patient not on any pressors    Pulmonary:  Maintain oxygen sats >92%  Pulmonary toilet  Patient extubated on 2022  Currently on 2 L nasal cannula oxygen breathing comfortably  Plan to get incentive spirometry. GI/Nutrition  sennakot 8.6-15 mg 1 tablet oral 2 times daily. Ulcer Prophylaxis:  Famotidine 20 mg  Diet:ADULT DIET; Regular  Renal/Fluid/Electrolyte  IV Fluids: 0.9NS @ 125 mL/Hr   I/O: In: 1925.8 [P.O.:480; I.V.:956.9; NG/GT:90]  Out: 2450 [Urine:2450]  UOP:  1.4 cc/kg/hr  Monitor electrolytes, replace PRN   ID  WBC:   Lab Results   Component Value Date    WBC 10.0 10/01/2022     Tmax: Temp (24hrs), Av.3 °F (37.9 °C), Min:98.4 °F (36.9 °C), Max:101.8 °F (38.8 °C)    Antimicrobials: Zosyn and Rocephin  indicated. Azithromycin stopped for Zosyn for broader spectrum coverage due to continued fevers. WBC count normal at 10.0. Hematology:  Recent Labs     09/29/22  0852 09/30/22  0311 10/01/22  0352   HGB 10.4* 9.4* 9.7*    stable  Endocrine:   glucose controlled - most recent BGL is   Recent Labs     09/29/22  0852 09/30/22 0311 10/01/22  0352   GLUCOSE 93 78 104*     DVT Prophylaxis  lovenox  Discharge Needs:    , PT, OT, ST, SW, and Case Management . Patient planned to transferred to the floors. CODE STATUS: Full Code             Kuldip Kim MD, M.D.              Department of Internal Medicine/ Critical care  [facility])             10/1/2022, 11:26 AM

## 2022-10-01 NOTE — PLAN OF CARE
Problem: Respiratory - Adult  Goal: Achieves optimal ventilation and oxygenation  10/1/2022 0414 by Columba King RN  Outcome: Progressing  9/30/2022 1829 by Ruddy Hoffmann RN  Outcome: Progressing  Flowsheets  Taken 9/30/2022 1600  Achieves optimal ventilation and oxygenation:   Assess for changes in respiratory status   Assess for changes in mentation and behavior   Position to facilitate oxygenation and minimize respiratory effort   Oxygen supplementation based on oxygen saturation or arterial blood gases  Taken 9/30/2022 0800  Achieves optimal ventilation and oxygenation:   Assess for changes in respiratory status   Oxygen supplementation based on oxygen saturation or arterial blood gases     Problem: Discharge Planning  Goal: Discharge to home or other facility with appropriate resources  10/1/2022 0414 by Columba King RN  Outcome: Progressing  9/30/2022 1829 by Ruddy Hoffmann RN  Outcome: Progressing  Flowsheets (Taken 9/30/2022 1600)  Discharge to home or other facility with appropriate resources:   Identify barriers to discharge with patient and caregiver   Arrange for needed discharge resources and transportation as appropriate   Identify discharge learning needs (meds, wound care, etc)   Refer to discharge planning if patient needs post-hospital services based on physician order or complex needs related to functional status, cognitive ability or social support system     Problem: Pain  Goal: Verbalizes/displays adequate comfort level or baseline comfort level  10/1/2022 0414 by Columba King RN  Outcome: Progressing  9/30/2022 1829 by Ruddy Hoffmann RN  Outcome: Progressing  Flowsheets (Taken 9/30/2022 0700)  Verbalizes/displays adequate comfort level or baseline comfort level: Assess pain using appropriate pain scale     Problem: Skin/Tissue Integrity  Goal: Absence of new skin breakdown  Description: 1. Monitor for areas of redness and/or skin breakdown  2.   Assess vascular access sites hourly  3. Every 4-6 hours minimum:  Change oxygen saturation probe site  4. Every 4-6 hours:  If on nasal continuous positive airway pressure, respiratory therapy assess nares and determine need for appliance change or resting period.   10/1/2022 0414 by Camron Hatch RN  Outcome: Progressing  9/30/2022 1829 by Juarez Mendez RN  Outcome: Progressing     Problem: Safety - Adult  Goal: Free from fall injury  10/1/2022 0414 by Camron Hatch RN  Outcome: Progressing  9/30/2022 [address] by Juarez Mendez RN  Outcome: Progressing     Problem: ABCDS Injury Assessment  Goal: Absence of physical injury  10/1/2022 0414 by Camron Hatch RN  Outcome: Progressing  9/30/2022 1829 by Juarez Mendez RN  Outcome: Progressing

## 2022-10-02 ENCOUNTER — APPOINTMENT (OUTPATIENT)
Dept: GENERAL RADIOLOGY | Age: 42
DRG: 720 | End: 2022-10-02
Payer: COMMERCIAL

## 2022-10-02 LAB
ABSOLUTE EOS #: 0.19 K/UL (ref 0–0.44)
ABSOLUTE IMMATURE GRANULOCYTE: 0.04 K/UL (ref 0–0.3)
ABSOLUTE LYMPH #: 1.05 K/UL (ref 1.1–3.7)
ABSOLUTE MONO #: 0.81 K/UL (ref 0.1–1.2)
ANION GAP SERPL CALCULATED.3IONS-SCNC: 11 MMOL/L (ref 9–17)
BASOPHILS # BLD: 0 % (ref 0–2)
BASOPHILS ABSOLUTE: <0.03 K/UL (ref 0–0.2)
BUN BLDV-MCNC: 4 MG/DL (ref 6–20)
CALCIUM SERPL-MCNC: 7.8 MG/DL (ref 8.6–10.4)
CHLORIDE BLD-SCNC: 103 MMOL/L (ref 98–107)
CO2: 25 MMOL/L (ref 20–31)
CREAT SERPL-MCNC: 0.32 MG/DL (ref 0.5–0.9)
CULTURE: ABNORMAL
CULTURE: ABNORMAL
DIRECT EXAM: ABNORMAL
EOSINOPHILS RELATIVE PERCENT: 2 % (ref 1–4)
GFR AFRICAN AMERICAN: >60 ML/MIN
GFR NON-AFRICAN AMERICAN: >60 ML/MIN
GFR SERPL CREATININE-BSD FRML MDRD: ABNORMAL ML/MIN/{1.73_M2}
GLUCOSE BLD-MCNC: 100 MG/DL (ref 70–99)
HCT VFR BLD CALC: 30.4 % (ref 36.3–47.1)
HEMOGLOBIN: 10.4 G/DL (ref 11.9–15.1)
IMMATURE GRANULOCYTES: 1 %
LYMPHOCYTES # BLD: 13 % (ref 24–43)
MAGNESIUM: 1.8 MG/DL (ref 1.6–2.6)
MCH RBC QN AUTO: 30.8 PG (ref 25.2–33.5)
MCHC RBC AUTO-ENTMCNC: 34.2 G/DL (ref 28.4–34.8)
MCV RBC AUTO: 89.9 FL (ref 82.6–102.9)
MONOCYTES # BLD: 10 % (ref 3–12)
NRBC AUTOMATED: 0 PER 100 WBC
PDW BLD-RTO: 13.8 % (ref 11.8–14.4)
PLATELET # BLD: 217 K/UL (ref 138–453)
PMV BLD AUTO: 11.2 FL (ref 8.1–13.5)
POTASSIUM SERPL-SCNC: 3.3 MMOL/L (ref 3.7–5.3)
RBC # BLD: 3.38 M/UL (ref 3.95–5.11)
SEG NEUTROPHILS: 74 % (ref 36–65)
SEGMENTED NEUTROPHILS ABSOLUTE COUNT: 6.29 K/UL (ref 1.5–8.1)
SODIUM BLD-SCNC: 139 MMOL/L (ref 135–144)
SPECIMEN DESCRIPTION: ABNORMAL
WBC # BLD: 8.4 K/UL (ref 3.5–11.3)

## 2022-10-02 PROCEDURE — 6370000000 HC RX 637 (ALT 250 FOR IP): Performed by: STUDENT IN AN ORGANIZED HEALTH CARE EDUCATION/TRAINING PROGRAM

## 2022-10-02 PROCEDURE — 71045 X-RAY EXAM CHEST 1 VIEW: CPT

## 2022-10-02 PROCEDURE — 2500000003 HC RX 250 WO HCPCS: Performed by: STUDENT IN AN ORGANIZED HEALTH CARE EDUCATION/TRAINING PROGRAM

## 2022-10-02 PROCEDURE — 6360000002 HC RX W HCPCS: Performed by: NURSE PRACTITIONER

## 2022-10-02 PROCEDURE — 97116 GAIT TRAINING THERAPY: CPT

## 2022-10-02 PROCEDURE — 97535 SELF CARE MNGMENT TRAINING: CPT

## 2022-10-02 PROCEDURE — 2580000003 HC RX 258: Performed by: STUDENT IN AN ORGANIZED HEALTH CARE EDUCATION/TRAINING PROGRAM

## 2022-10-02 PROCEDURE — 97165 OT EVAL LOW COMPLEX 30 MIN: CPT

## 2022-10-02 PROCEDURE — 6370000000 HC RX 637 (ALT 250 FOR IP)

## 2022-10-02 PROCEDURE — 99291 CRITICAL CARE FIRST HOUR: CPT | Performed by: INTERNAL MEDICINE

## 2022-10-02 PROCEDURE — 85025 COMPLETE CBC W/AUTO DIFF WBC: CPT

## 2022-10-02 PROCEDURE — 1200000000 HC SEMI PRIVATE

## 2022-10-02 PROCEDURE — 97161 PT EVAL LOW COMPLEX 20 MIN: CPT

## 2022-10-02 PROCEDURE — 80048 BASIC METABOLIC PNL TOTAL CA: CPT

## 2022-10-02 PROCEDURE — 6370000000 HC RX 637 (ALT 250 FOR IP): Performed by: NURSE PRACTITIONER

## 2022-10-02 PROCEDURE — 6360000002 HC RX W HCPCS: Performed by: STUDENT IN AN ORGANIZED HEALTH CARE EDUCATION/TRAINING PROGRAM

## 2022-10-02 PROCEDURE — 83735 ASSAY OF MAGNESIUM: CPT

## 2022-10-02 PROCEDURE — 36415 COLL VENOUS BLD VENIPUNCTURE: CPT

## 2022-10-02 RX ADMIN — ACETAMINOPHEN 650 MG: 325 TABLET ORAL at 18:36

## 2022-10-02 RX ADMIN — SODIUM CHLORIDE, PRESERVATIVE FREE 10 ML: 5 INJECTION INTRAVENOUS at 21:59

## 2022-10-02 RX ADMIN — GUAIFENESIN 600 MG: 600 TABLET, EXTENDED RELEASE ORAL at 21:58

## 2022-10-02 RX ADMIN — PIPERACILLIN AND TAZOBACTAM 3375 MG: 3; .375 INJECTION, POWDER, FOR SOLUTION INTRAVENOUS at 10:54

## 2022-10-02 RX ADMIN — SODIUM CHLORIDE, PRESERVATIVE FREE 10 ML: 5 INJECTION INTRAVENOUS at 08:47

## 2022-10-02 RX ADMIN — SODIUM CHLORIDE, PRESERVATIVE FREE 20 MG: 5 INJECTION INTRAVENOUS at 08:46

## 2022-10-02 RX ADMIN — PIPERACILLIN AND TAZOBACTAM 3375 MG: 3; .375 INJECTION, POWDER, FOR SOLUTION INTRAVENOUS at 02:00

## 2022-10-02 RX ADMIN — POTASSIUM CHLORIDE 20 MEQ: 1500 TABLET, EXTENDED RELEASE ORAL at 18:28

## 2022-10-02 RX ADMIN — DOCUSATE SODIUM 50 MG AND SENNOSIDES 8.6 MG 1 TABLET: 8.6; 5 TABLET, FILM COATED ORAL at 21:58

## 2022-10-02 RX ADMIN — Medication 81 MG: at 08:38

## 2022-10-02 RX ADMIN — LISINOPRIL 5 MG: 5 TABLET ORAL at 08:38

## 2022-10-02 RX ADMIN — GABAPENTIN 100 MG: 100 CAPSULE ORAL at 21:58

## 2022-10-02 RX ADMIN — ENOXAPARIN SODIUM 40 MG: 100 INJECTION SUBCUTANEOUS at 08:47

## 2022-10-02 RX ADMIN — SODIUM CHLORIDE, PRESERVATIVE FREE 20 MG: 5 INJECTION INTRAVENOUS at 21:58

## 2022-10-02 RX ADMIN — GABAPENTIN 100 MG: 100 CAPSULE ORAL at 14:05

## 2022-10-02 RX ADMIN — GUAIFENESIN 600 MG: 600 TABLET, EXTENDED RELEASE ORAL at 08:38

## 2022-10-02 RX ADMIN — GABAPENTIN 100 MG: 100 CAPSULE ORAL at 08:37

## 2022-10-02 RX ADMIN — PIPERACILLIN AND TAZOBACTAM 3375 MG: 3; .375 INJECTION, POWDER, FOR SOLUTION INTRAVENOUS at 18:33

## 2022-10-02 RX ADMIN — POTASSIUM CHLORIDE 20 MEQ: 1500 TABLET, EXTENDED RELEASE ORAL at 08:37

## 2022-10-02 ASSESSMENT — PAIN DESCRIPTION - LOCATION: LOCATION: HEAD

## 2022-10-02 ASSESSMENT — PAIN DESCRIPTION - ORIENTATION: ORIENTATION: POSTERIOR

## 2022-10-02 ASSESSMENT — PAIN SCALES - GENERAL
PAINLEVEL_OUTOF10: 0
PAINLEVEL_OUTOF10: 6
PAINLEVEL_OUTOF10: 3

## 2022-10-02 ASSESSMENT — PAIN DESCRIPTION - DESCRIPTORS: DESCRIPTORS: ACHING

## 2022-10-02 NOTE — PROGRESS NOTES
Physical Therapy  Facility/Department: [facility] 3- MICU  Physical Therapy Initial Assessment    Name: Trudy Atkins  : 1980  MRN: [id number]  Date of Service: 10/2/2022  Chief Complaint   Patient presents with    Shortness of Breath      Discharge Recommendations:  No therapy recommended at discharge   PT Equipment Recommendations  Equipment Needed: No      Patient Diagnosis(es): The primary encounter diagnosis was Tachypnea. Diagnoses of Pneumonia of both lower lobes due to infectious organism, Sepsis, due to unspecified organism, unspecified whether acute organ dysfunction present [facility]), and Acute respiratory failure with hypoxia ([facility] [address] were also pertinent to this visit. Past Medical History:  has a past medical history of Anxiety, Chronic midline low back pain with right-sided sciatica, Degeneration of cervical intervertebral disc, Depression, Essential hypertension, Gastro-esophageal reflux disease without esophagitis, Lumbar radiculopathy, acute, and Opioid use, unspecified with other opioid-induced disorder (HCC)(Suboxone). Past Surgical History:  has a past surgical history that includes Tubal ligation (2022). Assessment   Assessment: Pt performed well at therapy this date. Pt was able to ambulate 300 feet with no device and supervision. Pt is at baseline functional mobility without additional acute care PT needs. Pt is being discharged from PT at this time. PT should be reordered with a change in medical status. Pt is expected to be safe to return to prior living arrangements upon discharge based on today's session.   Therapy Prognosis: Good  Decision Making: Low Complexity  Requires PT Follow-Up: No  Activity Tolerance  Activity Tolerance: Patient tolerated treatment well     Plan   Physcial Therapy Plan  General Plan: Discharge  Safety Devices  Type of Devices: Left in bed, Nurse notified, Gait belt, Call light within reach  Restraints  Restraints Initially in Place: No Restrictions  Restrictions/Precautions  Restrictions/Precautions: General Precautions  Required Braces or Orthoses?: No  Position Activity Restriction  Other position/activity restrictions: neg for PE, MAP goal >65     Subjective   General  Patient assessed for rehabilitation services?: Yes  Response To Previous Treatment: Not applicable  Family / Caregiver Present: Yes  Follows Commands: Within Functional Limits  Subjective  Subjective: Pt supine in bed and agreeable to therapy, RN agreeable to therapy. Pt pleasant and cooperative throughout today's session. Pt denies pain at rest.         Social/Functional History  Social/Functional History  Lives With: Parent (mother)  Type of Home: House  Home Layout: One level  Home Access: Stairs to enter with rails  Entrance Stairs - Number of Steps: 2  Entrance Stairs - Rails: Right  Bathroom Shower/Tub: Tub/Shower unit  Bathroom Toilet: Standard  Bathroom Equipment: Grab bars in shower, Shower chair  Home Equipment: chante Kim (pt reports not using any DME at a baseline.)  ADL Assistance: [address]: Independent  Homemaking Responsibilities: Yes  Ambulation Assistance: Independent  Transfer Assistance: Independent  Active : Yes  Mode of Transportation: Car  Occupation: Unemployed  [address]: spending time with children  Additional Comments: Pt reports that her mother is retired and would be able to provide some physical assistance upon discharge.   Vision/Hearing  Vision  Vision: Impaired  Vision Exceptions: Wears glasses at all times  Hearing  Hearing: Within functional limits    Cognition   Cognition  Overall Cognitive Status: WFL     Objective              AROM RLE (degrees)  RLE AROM: WFL  AROM LLE (degrees)  LLE AROM : WFL  AROM RUE (degrees)  RUE AROM : WFL  AROM LUE (degrees)  LUE AROM : WFL  Strength RLE  Strength RLE: WFL  Comment: Grossly 4/5  Strength LLE  Strength LLE: WFL  Comment: Grossly 4/5  Strength RUE  Comment: Co-eval with OT, see OT note for UE detail. Strength LUE  Comment: Co-eval with OT, see OT note for UE detail. Bed Mobility Training  Bed Mobility Training: Yes  Supine to Sit: Independent  Sit to Supine: Other (comment) (OSCAR as pt retired sitting on EOB)  Scooting: Independent  Balance  Sitting: Intact ((I) while sitting unsupported on EOB/toilet)  Standing: Intact (Mod I while standing for ~14 min total this date, stood bedside/sinkside/func mob)  Gait  Overall Level of Assistance: Modified independent (Func mob for community distances, only minor SOB noted, no LOB)  Assistive Device: Other (comment) (no DME used)  Bed mobility  Supine to Sit: Supervision  Sit to Supine:  (pt retired to the EOB at the conclusion of today's session.)  Scooting: Supervision  Bed Mobility Comments: HOB elevated ~35 degrees. Transfers  Sit to Stand: Supervision  Stand to Sit: Supervision  Comment: Transfers performed 3x this date. Ambulation  Surface: Level tile  Device: No Device  Assistance: Supervision  Quality of Gait: good stability, decreased gait speed, no LOB. Distance: 300 feet. More Ambulation?: No  Stairs/Curb  Stairs?: No     Balance  Posture: Good  Sitting - Static: Good  Sitting - Dynamic: Good  Standing - Static: Good  Standing - Dynamic: Good;-  Comments: standing balance assessed while using no device. AM-PAC Score  AM-PAC Inpatient Mobility Raw Score : 24 (10/02/22 1403)  AM-PAC Inpatient T-Scale Score : 61.14 (10/02/22 1403)  Mobility Inpatient CMS 0-100% Score: 0 (10/02/22 1403)  Mobility Inpatient CMS G-Code Modifier : [facility] (10/02/22 1403)            Goals  Short Term Goals  Time Frame for Short Term Goals: Pt is at baseline functional mobility without additional acute care PT needs. Pt is being discharged from PT at this time. PT should be reordered with a change in medical status.        Education  Patient Education  Education Given To: Patient  Education Provided: Role of Therapy;Plan of Care; Fall Prevention Strategies; Energy Conservation  Education Method: Verbal  Barriers to Learning: None  Education Outcome: Verbalized understanding      Therapy Time   Individual Concurrent Group Co-treatment   Time In 0932         Time Out 0957         Minutes 25         Timed Code Treatment Minutes: [address] Chua PT

## 2022-10-02 NOTE — PLAN OF CARE
Problem: Respiratory - Adult  Goal: Achieves optimal ventilation and oxygenation  Flowsheets (Taken 10/2/2022 1710)  Achieves optimal ventilation and oxygenation: Oxygen supplementation based on oxygen saturation or arterial blood gases     Problem: Discharge Planning  Goal: Discharge to home or other facility with appropriate resources  Note: Tx orders in, awaiting a bed     Problem: Pain  Goal: Verbalizes/displays adequate comfort level or baseline comfort level  Outcome: Progressing  Note: Denies pain, VS stable      Problem: Skin/Tissue Integrity  Goal: Absence of new skin breakdown  Description: 1. Monitor for areas of redness and/or skin breakdown  2. Assess vascular access sites hourly  3. Every 4-6 hours minimum:  Change oxygen saturation probe site  4. Every 4-6 hours:  If on nasal continuous positive airway pressure, respiratory therapy assess nares and determine need for appliance change or resting period.   Outcome: Progressing  Note: Dependent areas inspected for skin breakdown      Problem: Safety - Adult  Goal: Free from fall injury  Note: Patient verbalizes the layout of the room; call light within reach; educated to call out when assistance needed      Problem: ABCDS Injury Assessment  Goal: Absence of physical injury  Outcome: Progressing  Note: Hand hygiene performed, double identifiers used

## 2022-10-02 NOTE — PLAN OF CARE
Problem: Respiratory - Adult  Goal: Achieves optimal ventilation and oxygenation  Outcome: Progressing  Flowsheets (Taken 10/1/2022 0800 by Shell Johns RN)  Achieves optimal ventilation and oxygenation:   Assess for changes in respiratory status   Assess for changes in mentation and behavior   Position to facilitate oxygenation and minimize respiratory effort   Oxygen supplementation based on oxygen saturation or arterial blood gases     Problem: Discharge Planning  Goal: Discharge to home or other facility with appropriate resources  Outcome: Progressing  Flowsheets (Taken 10/1/2022 0800 by Shell Johns RN)  Discharge to home or other facility with appropriate resources:   Identify barriers to discharge with patient and caregiver   Arrange for needed discharge resources and transportation as appropriate   Identify discharge learning needs (meds, wound care, etc)   Refer to discharge planning if patient needs post-hospital services based on physician order or complex needs related to functional status, cognitive ability or social support system     Problem: Pain  Goal: Verbalizes/displays adequate comfort level or baseline comfort level  Outcome: Progressing     Problem: Skin/Tissue Integrity  Goal: Absence of new skin breakdown  Description: 1. Monitor for areas of redness and/or skin breakdown  2. Assess vascular access sites hourly  3. Every 4-6 hours minimum:  Change oxygen saturation probe site  4. Every 4-6 hours:  If on nasal continuous positive airway pressure, respiratory therapy assess nares and determine need for appliance change or resting period.   Outcome: Progressing     Problem: Safety - Adult  Goal: Free from fall injury  Outcome: Progressing     Problem: ABCDS Injury Assessment  Goal: Absence of physical injury  Outcome: Progressing

## 2022-10-02 NOTE — PROGRESS NOTES
Occupational Therapy  [facility]  Occupational Therapy Initial Assessment    Name: Cathy Cruz  : 1980  MRN: [id number]  Date of Service: 10/2/2022  Chief Complaint   Patient presents with    Shortness of Breath       Discharge Recommendations: No therapy recommended at discharge. Defer OT at this time          Patient Diagnosis(es): The primary encounter diagnosis was Tachypnea. Diagnoses of Pneumonia of both lower lobes due to infectious organism, Sepsis, due to unspecified organism, unspecified whether acute organ dysfunction present [facility]), and Acute respiratory failure with hypoxia ([facility] [address] were also pertinent to this visit. Past Medical History:  has a past medical history of Anxiety, Chronic midline low back pain with right-sided sciatica, Degeneration of cervical intervertebral disc, Depression, Essential hypertension, Gastro-esophageal reflux disease without esophagitis, Lumbar radiculopathy, acute, and Opioid use, unspecified with other opioid-induced disorder (HCC)(Suboxone). Past Surgical History:  has a past surgical history that includes Tubal ligation (2022). Assessment   Decision Making: Low Complexity  No Skilled OT: Independent with functional mobility; Independent with ADL's;At baseline function; Safe to return home  REQUIRES OT FOLLOW-UP: No  Activity Tolerance  Activity Tolerance: Patient Tolerated treatment well              Restrictions  Restrictions/Precautions  Restrictions/Precautions: General Precautions  Required Braces or Orthoses?: No  Position Activity Restriction  Other position/activity restrictions: neg for PE, MAP goal >65    Subjective   General  Patient assessed for rehabilitation services?: Yes  Family / Caregiver Present: Yes (Sig other)  Diagnosis: Fever, PNA, extubated on , hx of opiate use  Subjective  Subjective: 0/10 pain level per pt     Social/Functional History  Social/Functional History  Lives With: Parent (mother)  Type of Home: House  Home Layout: One level  Home Access: Stairs to enter with rails  Entrance Stairs - Number of Steps: 2  Entrance Stairs - Rails: Right  Bathroom Shower/Tub: Tub/Shower unit  Bathroom Toilet: Standard  Bathroom Equipment: Grab bars in shower, Shower chair  Home Equipment: Atlee Hurdland, rolling (pt reports not using any DME at a baseline.)  ADL Assistance: [address]: Independent  Homemaking Responsibilities: Yes  Ambulation Assistance: Independent  Transfer Assistance: Independent  Active : Yes  Mode of Transportation: Car  Occupation: Unemployed  [address]: spending time with children  Additional Comments: Pt reports that her mother is retired and would be able to provide some physical assistance upon discharge. Objective   SpO2: 90 %  O2 Device: None (Room air)             Safety Devices  Type of Devices: Left in bed;Nurse notified;Gait belt;Call light within reach  Restraints  Restraints Initially in Place: No  Bed Mobility Training  Bed Mobility Training: Yes  Supine to Sit: Independent  Sit to Supine: Other (comment) (OSCAR as pt retired sitting on EOB)  Scooting: Independent  Balance  Sitting: Intact ((I) while sitting unsupported on EOB/toilet)  Standing: Intact (Mod I while standing for ~14 min total this date, stood bedside/sinkside/func mob)  Gait  Overall Level of Assistance: Modified independent (Func mob for community distances, only minor SOB noted, no LOB)  Assistive Device: Other (comment) (no DME used)     AROM: Within functional limits  PROM: Within functional limits  Strength:  Within functional limits (5/5 grossly at B/L shoulders/elbows/hands)  Coordination: Within functional limits  Tone: Normal  Sensation: Intact  ADL  Feeding: Independent  Grooming: Independent  UE Bathing: Independent  LE Bathing: Independent  UE Dressing: Independent  LE Dressing: Independent  Toileting: Independent  Additional Comments: Pt able to don B/L socks sitting on EOB, stood sinkside for face/hand-washing, sat on toilet for successful urination, pt (I) with all ADL's and mobility this date, minor SOB noted only, SPO2 hovering around 90% range entire session after activity              Vision  Vision: Impaired  Vision Exceptions: Wears glasses at all times  Hearing  Hearing: Within functional limits  Cognition  Overall Cognitive Status: WNL  Following Commands: Follows all commands without difficulty  Attention Span: Appears intact  Orientation  Overall Orientation Status: Within Normal Limits          Education Given To: Patient  Education Provided: Precautions;Plan of Care;Role of Therapy; Energy Conservation  Education Provided Comments: EC/WS techniques  Education Method: Verbal  Barriers to Learning: Vision  Education Outcome: Verbalized understanding  LUE AROM (degrees)  LUE AROM : [facility]  RUE AROM (degrees)  RUE AROM : [facility]            AM-PAC Score        [facility] Inpatient Daily Activity Raw Score: 24 (10/02/22 1144)  AM-PAC Inpatient ADL T-Scale Score : 57.54 (10/02/22 1144)  ADL Inpatient CMS 0-100% Score: 0 (10/02/22 1144)  ADL Inpatient CMS G-Code Modifier : [facility] (10/02/22 1144)       Goals  Short Term Goals  Time Frame for Short Term Goals: OT eval and d/c d/t (I)       Therapy Time   Individual Concurrent Group Co-treatment   Time In 0931         Time Out 0951         Minutes 20         Timed Code Treatment Minutes: 9 Minutes       Smita Cason OTR/LINDSAY

## 2022-10-02 NOTE — PROGRESS NOTES
Critical Care Team - Daily Progress Note      Date and time: 10/2/2022 8:55 AM  Patient's name:  [address] Record Number: [id number]  Patient's account/billing number: [de-identified]  Patient's YOB: 1980  Age: 39 y.o. Date of Admission: 9/28/2022  5:16 PM  Length of stay during current admission: 4      Primary Care Physician: TABITHA Ortiz CNP  ICU Attending Physician: Dr. Ramiro Galvan Status: Full Code    Reason for ICU admission:   Chief Complaint   Patient presents with    Shortness of Breath         SUBJECTIVE:   HPI:  38 yo female PMH of opioid use and dependence on suboxone, HTN and anxiety. Presented to the ED with SOB for 3 days, along with fever, cough, congestion and body aches. She presented tachypnic and was desaturating to the low 80s. She was placed on NRB with improvement of her saturations and RR. She has a history of DVT provoked by pregnancy in the past, not on any AC at this time. CT PE was done, no PE but significant for severe lobar pneumonia in bilateral lower lobes and right middle lobe. COVID swab was negative. Labs showed mild hypokalemia, leukocytosis of 13.8 otherwise unremarkable. Started on azithromycin and rocephin for CAP. Patient had a worsening respiratory status while in the ED, was intubated due to hypoxia. Was initially sedated on propofol, versed and fentanyl. Hemodynamically stable. 9/29:intubated  9/30:Extubated  10/1: Azithromycin stopped; zosyn started    Interval history:    During bedside evaluation, patient was alert ,oriented, and under no acute distress. The patient denied any shortness of breath but did report that she has been having. Patient still has recurrent fever and took Tylenol twice for fever since last night. The patient reported that she cough with clear/brown sputum production. Patient denies any vision problems, chest pain, abdominal pain, urinary symptoms, diarrhea, blood in urine, blood in stools.   Urinary catheter was taken out. The patient is urinating normally with no blood in urine. She was on 2 L nasal cannula oxygen during sleep and during examination was on 1 L nasal cannula oxygen. Patient did not have any bowel movement till now. Chest examination revealed coarse breath sounds on the right side with wheezing bilaterally. Cardiovascular and abdominal examinations are within normal limits.     OVERNIGHT EVENTS:       No acute overnight events    AWAKE & FOLLOWING COMMANDS:  [] No   [x] Yes    CURRENT VENTILATION STATUS:     [] Ventilator  [] BIPAP  [x] Nasal Cannula [] Room Air      SECRETIONS Amount:  [x] Small [] Moderate  [] Large  [] None  Color:     [] White [x] Colored  [] Bloody    SEDATION:  RAAS Score:  [] Propofol gtt  [] Versed gtt  [] Ativan gtt   [x] No Sedation    PARALYZED:  [x] No    [] Yes    DIARRHEA:                [x] No                [] Yes  (C. Difficile status: [] positive                                                                                                                       [] negative                                                                                                                     [] pending)    VASOPRESSORS:  [x] No    [] Yes    If yes -   [] Levophed       [] Dopamine     [] Vasopressin       [] Dobutamine  [] Phenylephrine         [] Epinephrine    CENTRAL LINES:     [x] No   [] Yes   (Date of Insertion:   )           If yes -     [] Right IJ     [] Left IJ [] Right Femoral [] Left Femoral                   [] Right Subclavian [] Left Subclavian       ESPINOZA'S CATHETER:   [x] No   [] Yes  (Date of Insertion:   )     URINE OUTPUT:            [x] Good   [] Low              [] Anuric      OBJECTIVE:     VITAL SIGNS:  BP (!) 156/105   Pulse 86   Temp 97.7 °F (36.5 °C) (Oral)   Resp 23   Ht 5' 8\" (1.727 m)   Wt 154 lb 15.7 oz (70.3 kg)   SpO2 92%   BMI 23.57 kg/m²   Tmax over 24 hours:  Temp (24hrs), Av.5 °F (37.5 °C), Min:97.7 °F (36.5 °C), Max:100.4 °F (38 °C)      Patient Vitals for the past 8 hrs:   BP Temp Temp src Pulse Resp SpO2   10/02/22 0742 (!) 156/105 97.7 °F (36.5 °C) Oral 86 23 92 %   10/02/22 0730 -- -- -- 82 22 93 %   10/02/22 0700 (!) 149/97 -- -- 86 22 92 %   10/02/22 0600 (!) 146/93 100.4 °F (38 °C) -- 75 22 95 %   10/02/22 0500 (!) 145/96 -- -- 75 22 96 %   10/02/22 0400 (!) 147/95 100 °F (37.8 °C) Bladder 72 23 95 %   10/02/22 0300 (!) 146/93 -- -- 72 23 95 %   10/02/22 0200 (!) 142/89 99 °F (37.2 °C) -- 68 22 91 %   10/02/22 0100 122/83 -- -- 68 23 90 %         Intake/Output Summary (Last 24 hours) at 10/2/2022 0855  Last data filed at 10/2/2022 0134  Gross per 24 hour   Intake 190.57 ml   Output 725 ml   Net -534.43 ml     Date 10/02/22 0000 - 10/02/22 2359   Shift 2518-0284 7556-1461 0871-1931 24 Hour Total   INTAKE   I.V.(mL/kg) 68.5(1)   68.5(1)   IV Piggyback(mL/kg) 72.5(1)   72.5(1)   Shift Total(mL/kg) 141.1(2)   141.1(2)   OUTPUT   Shift Total(mL/kg)       Weight (kg) 70.3 70.3 70.3 70.3     Wt Readings from Last 3 Encounters:   09/30/22 154 lb 15.7 oz (70.3 kg)   09/01/22 157 lb (71.2 kg)   07/25/22 157 lb (71.2 kg)     Body mass index is 23.57 kg/m². PHYSICAL EXAM:  Constitutional: Appears well, no distress  EENT: PERRLA, EOMI, sclera clear, anicteric, oropharynx clear, no lesions, neck supple with midline trachea. Neck: Supple, symmetrical, trachea midline. Respiratory: Coarse breath sounds heard more on the right side than left side. Wheezing bilaterally. Cardiovascular: regular rate and rhythm, normal S1, S2, no murmur noted and 2+ pulses throughout  Abdomen: soft, nontender, nondistended, no masses or organomegaly  NEUROLOGIC: Awake, alert, oriented to name, place and time.  Extremities:  peripheral pulses normal, no pedal edema, no clubbing or cyanosis  SKIN: normal coloration and turgor    Any additional physical findings:      MEDICATIONS:  Scheduled Meds:   potassium chloride  20 mEq Oral BID WC piperacillin-tazobactam  3,375 mg IntraVENous Q8H    guaiFENesin  600 mg Oral BID    aspirin  81 mg Oral Daily    gabapentin  100 mg Oral TID    lisinopril  5 mg Oral Daily    enoxaparin  40 mg SubCUTAneous Daily    sodium chloride flush  5-40 mL IntraVENous 2 times per day    sennosides-docusate sodium  1 tablet Oral BID    famotidine (PEPCID) injection  20 mg IntraVENous BID     Continuous Infusions:   sodium chloride 10 mL/hr at 10/02/22 0134     PRN Meds:   benzocaine-menthol, 1 lozenge, Q2H PRN  sodium chloride, , PRN  acetaminophen, 650 mg, Q6H PRN   Or  acetaminophen, 650 mg, Q6H PRN  sodium chloride flush, 5-40 mL, PRN  ondansetron, 4 mg, Q8H PRN   Or  ondansetron, 4 mg, Q6H PRN  polyethylene glycol, 17 g, Daily PRN        SUPPORT DEVICES: [] Ventilator [] BIPAP  [x] Nasal Cannula [] Room Air    Lab Results   Component Value Date/Time    FIO2 40.0 09/30/2022 04:17 AM     Lactic Acid: No results found for: LACTA      DATA:  Complete Blood Count:   Recent Labs     09/30/22  0311 10/01/22  0352 10/02/22  0531   WBC 10.4 10.0 8.4   HGB 9.4* 9.7* 10.4*   MCV 92.9 92.5 89.9    156 217   RBC 3.12* 3.21* 3.38*   HCT 29.0* 29.7* 30.4*   MCH 30.1 30.2 30.8   MCHC 32.4 32.7 34.2   RDW 14.3 14.1 13.8   MPV 12.1 12.0 11.2        PT/INR:    Lab Results   Component Value Date/Time    PROTIME 12.7 09/29/2022 04:17 AM    INR 1.3 09/29/2022 04:17 AM     PTT:    Lab Results   Component Value Date/Time    APTT 32.1 09/28/2022 05:45 PM       Basal Metabolic Profile:   Recent Labs     09/30/22  0311 10/01/22  0352 10/02/22  0531    140 139   K 3.8 3.2* 3.3*   BUN 7 3* 4*   CREATININE 0.37* 0.31* 0.32*    107 103   CO2 19* 24 25      Magnesium:   Lab Results   Component Value Date/Time    MG 1.8 10/02/2022 05:31 AM    MG 1.6 10/01/2022 03:52 AM     Phosphorus: No results found for: PHOS  S. Calcium:  Recent Labs     10/02/22  0531   CALCIUM 7.8*     S.  Ionized Calcium:No results for input(s): IONCA in the last 72 hours. Urinalysis:   Lab Results   Component Value Date/Time    NITRU NEGATIVE 09/28/2022 09:52 PM    COLORU Yellow 09/28/2022 09:52 PM    PHUR 7.5 09/28/2022 09:52 PM    WBCUA None 09/28/2022 09:52 PM    RBCUA 0 TO 2 09/28/2022 09:52 PM    MUCUS NOT REPORTED 05/01/2012 04:35 AM    TRICHOMONAS NOT REPORTED 05/01/2012 04:35 AM    YEAST NOT REPORTED 05/01/2012 04:35 AM    BACTERIA None 03/21/2022 10:17 AM    SPECGRAV 1.005 09/28/2022 09:52 PM    LEUKOCYTESUR NEGATIVE 09/28/2022 09:52 PM    UROBILINOGEN Normal 09/28/2022 09:52 PM    BILIRUBINUR NEGATIVE 09/28/2022 09:52 PM    BILIRUBINUR NEGATIVE 05/01/2012 04:35 AM    GLUCOSEU NEGATIVE 09/28/2022 09:52 PM    GLUCOSEU NEGATIVE 05/01/2012 04:35 AM    KETUA NEGATIVE 09/28/2022 09:52 PM    AMORPHOUS NOT REPORTED 05/01/2012 04:35 AM       CARDIAC ENZYMES: No results for input(s): CKMB, CKMBINDEX, TROPONINI in the last 72 hours. Invalid input(s): CKTOTAL;3  BNP: No results for input(s): BNP in the last 72 hours. LFTS  No results for input(s): ALKPHOS, ALT, AST, BILITOT, BILIDIR, LABALBU in the last 72 hours. AMYLASE/LIPASE/AMMONIA  No results for input(s): AMYLASE, LIPASE, AMMONIA in the last 72 hours. Last 3 Blood Glucose:   Recent Labs     09/30/22  0311 10/01/22  0352 10/02/22  0531   GLUCOSE 78 104* 100*      HgBA1c:    Lab Results   Component Value Date/Time    LABA1C 5.1 03/21/2022 09:14 AM         TSH:    Lab Results   Component Value Date/Time    TSH 1.73 03/21/2022 09:14 AM     ANEMIA STUDIES  No results for input(s): LABIRON, TIBC, FERRITIN, BCVBKANS50, FOLATE, OCCULTBLD in the last 72 hours.             Cultures during this admission:     Blood cultures:                 [] None drawn      [x] Negative             []  Positive (Details:  )  Urine Culture:                   [] None drawn      [x] Negative             []  Positive (Details:  )  Sputum Culture:               [x] None drawn       [] Negative             []  Positive (Details: )   Endotracheal aspirate:     [] None drawn       [] Negative             [x]  Positive (Details: Few gram-positive cocci in pairs)             Chest Xray (10/2/2022): Impression   No significant change from prior study. Airspace opacity in the right mid   and lower lung zones remains with right effusion. Patient has some   loculation at the right base. ASSESSMENT:     Principal Problem:    Sepsis ([address]  Active Problems:    Pneumonia due to infectious organism    Hypokalemia    Pneumonia    Tachypnea    Acute respiratory failure with hypoxia ([address]    Essential hypertension  Resolved Problems:    * No resolved hospital problems. *       PLAN:   PLAN/MEDICAL DECISION MAKING:  Neurologic:   Neuro intact  Neuro checks per protocol  no sedation    Cardiovascular:  Hemodynamically stable  MAP goal >65  Patient not on any pressors  Pulmonary:  Maintain oxygen sats >92%  Pulmonary toilet  Patient on 1 L nasal cannula oxygen, breathing comfortably  Plan to get a room air trial.    GI/Nutrition  sennakot 1 6-50 mg tablet 2 times daily. Ulcer Prophylaxis:  Pepcid 2 mg  Diet:ADULT DIET; Regular  Renal/Fluid/Electrolyte  IV Fluids: Continuous fluids stopped. 0.9% sodium chloride infusion IV 5 2 to 50 mL/h as needed. I/O: In: 685.2 [I.V.:185.8]  Out: 850 [Urine:850]  UOP: 0.5 cc/kg/hr  Monitor electrolytes, replace PRN   ID  WBC:   Lab Results   Component Value Date    WBC 8.4 10/02/2022     Tmax: Temp (24hrs), Av.5 °F (37.5 °C), Min:97.7 °F (36.5 °C), Max:100.4 °F (38 °C)    Antimicrobials: Zosyn continued. Rocephin completed.   Hematology:  Recent Labs     09/30/22  0311 10/01/22  0352 10/02/22  0531   HGB 9.4* 9.7* 10.4*    trending up  Endocrine:   glucose controlled - most recent BGL is   Recent Labs     09/30/22  0311 10/01/22  0352 10/02/22  0531   GLUCOSE 78 104* 100*     DVT Prophylaxis  lovenox  Discharge Needs:  PT, OT, ST, SW, and Case Management      - According to Occupational Therapy, patient is independent with functional mobility and did baseline function.    - Transfer ordered to med surg unit in place. Patient expected to be transferred to the floors today. CODE STATUS: Full Code               Bibiana Botello MD, M.D.              Department of Internal Medicine/ Critical care  [facility] ([address] [place] [address])             10/2/2022, 8:55 AM

## 2022-10-03 ENCOUNTER — APPOINTMENT (OUTPATIENT)
Dept: GENERAL RADIOLOGY | Age: 42
DRG: 720 | End: 2022-10-03
Payer: COMMERCIAL

## 2022-10-03 LAB
ABSOLUTE EOS #: 0.21 K/UL (ref 0–0.44)
ABSOLUTE IMMATURE GRANULOCYTE: 0.03 K/UL (ref 0–0.3)
ABSOLUTE LYMPH #: 1.57 K/UL (ref 1.1–3.7)
ABSOLUTE MONO #: 0.85 K/UL (ref 0.1–1.2)
ANION GAP SERPL CALCULATED.3IONS-SCNC: 11 MMOL/L (ref 9–17)
BASOPHILS # BLD: 0 % (ref 0–2)
BASOPHILS ABSOLUTE: <0.03 K/UL (ref 0–0.2)
BUN BLDV-MCNC: 5 MG/DL (ref 6–20)
CALCIUM SERPL-MCNC: 8.3 MG/DL (ref 8.6–10.4)
CHLORIDE BLD-SCNC: 105 MMOL/L (ref 98–107)
CO2: 24 MMOL/L (ref 20–31)
CREAT SERPL-MCNC: 0.32 MG/DL (ref 0.5–0.9)
CULTURE: NORMAL
CULTURE: NORMAL
EOSINOPHILS RELATIVE PERCENT: 3 % (ref 1–4)
GFR AFRICAN AMERICAN: >60 ML/MIN
GFR NON-AFRICAN AMERICAN: >60 ML/MIN
GFR SERPL CREATININE-BSD FRML MDRD: ABNORMAL ML/MIN/{1.73_M2}
GLUCOSE BLD-MCNC: 83 MG/DL (ref 70–99)
HCT VFR BLD CALC: 32.8 % (ref 36.3–47.1)
HEMOGLOBIN: 11 G/DL (ref 11.9–15.1)
IMMATURE GRANULOCYTES: 1 %
LYMPHOCYTES # BLD: 24 % (ref 24–43)
Lab: NORMAL
Lab: NORMAL
MAGNESIUM: 1.9 MG/DL (ref 1.6–2.6)
MCH RBC QN AUTO: 30.4 PG (ref 25.2–33.5)
MCHC RBC AUTO-ENTMCNC: 33.5 G/DL (ref 28.4–34.8)
MCV RBC AUTO: 90.6 FL (ref 82.6–102.9)
MONOCYTES # BLD: 13 % (ref 3–12)
NRBC AUTOMATED: 0 PER 100 WBC
PDW BLD-RTO: 13.8 % (ref 11.8–14.4)
PLATELET # BLD: 269 K/UL (ref 138–453)
PMV BLD AUTO: 11.4 FL (ref 8.1–13.5)
POTASSIUM SERPL-SCNC: 3.5 MMOL/L (ref 3.7–5.3)
RBC # BLD: 3.62 M/UL (ref 3.95–5.11)
SEG NEUTROPHILS: 59 % (ref 36–65)
SEGMENTED NEUTROPHILS ABSOLUTE COUNT: 3.98 K/UL (ref 1.5–8.1)
SODIUM BLD-SCNC: 140 MMOL/L (ref 135–144)
SPECIMEN DESCRIPTION: NORMAL
SPECIMEN DESCRIPTION: NORMAL
WBC # BLD: 6.7 K/UL (ref 3.5–11.3)

## 2022-10-03 PROCEDURE — 2580000003 HC RX 258: Performed by: STUDENT IN AN ORGANIZED HEALTH CARE EDUCATION/TRAINING PROGRAM

## 2022-10-03 PROCEDURE — 2700000000 HC OXYGEN THERAPY PER DAY

## 2022-10-03 PROCEDURE — 99291 CRITICAL CARE FIRST HOUR: CPT | Performed by: INTERNAL MEDICINE

## 2022-10-03 PROCEDURE — 6370000000 HC RX 637 (ALT 250 FOR IP)

## 2022-10-03 PROCEDURE — 80048 BASIC METABOLIC PNL TOTAL CA: CPT

## 2022-10-03 PROCEDURE — 6370000000 HC RX 637 (ALT 250 FOR IP): Performed by: NURSE PRACTITIONER

## 2022-10-03 PROCEDURE — 6370000000 HC RX 637 (ALT 250 FOR IP): Performed by: STUDENT IN AN ORGANIZED HEALTH CARE EDUCATION/TRAINING PROGRAM

## 2022-10-03 PROCEDURE — 83735 ASSAY OF MAGNESIUM: CPT

## 2022-10-03 PROCEDURE — 2500000003 HC RX 250 WO HCPCS: Performed by: STUDENT IN AN ORGANIZED HEALTH CARE EDUCATION/TRAINING PROGRAM

## 2022-10-03 PROCEDURE — 85025 COMPLETE CBC W/AUTO DIFF WBC: CPT

## 2022-10-03 PROCEDURE — 6360000002 HC RX W HCPCS: Performed by: STUDENT IN AN ORGANIZED HEALTH CARE EDUCATION/TRAINING PROGRAM

## 2022-10-03 PROCEDURE — 1200000000 HC SEMI PRIVATE

## 2022-10-03 PROCEDURE — 36415 COLL VENOUS BLD VENIPUNCTURE: CPT

## 2022-10-03 PROCEDURE — 94761 N-INVAS EAR/PLS OXIMETRY MLT: CPT

## 2022-10-03 RX ORDER — LEVOFLOXACIN 750 MG/1
750 TABLET ORAL DAILY
Status: DISCONTINUED | OUTPATIENT
Start: 2022-10-03 | End: 2022-10-04 | Stop reason: HOSPADM

## 2022-10-03 RX ADMIN — LISINOPRIL 5 MG: 5 TABLET ORAL at 09:25

## 2022-10-03 RX ADMIN — SODIUM CHLORIDE, PRESERVATIVE FREE 10 ML: 5 INJECTION INTRAVENOUS at 09:26

## 2022-10-03 RX ADMIN — LEVOFLOXACIN 750 MG: 750 TABLET, FILM COATED ORAL at 21:00

## 2022-10-03 RX ADMIN — POTASSIUM CHLORIDE 20 MEQ: 1500 TABLET, EXTENDED RELEASE ORAL at 18:00

## 2022-10-03 RX ADMIN — SODIUM CHLORIDE, PRESERVATIVE FREE 10 ML: 5 INJECTION INTRAVENOUS at 20:02

## 2022-10-03 RX ADMIN — GABAPENTIN 100 MG: 100 CAPSULE ORAL at 09:25

## 2022-10-03 RX ADMIN — GUAIFENESIN 600 MG: 600 TABLET, EXTENDED RELEASE ORAL at 09:26

## 2022-10-03 RX ADMIN — GABAPENTIN 100 MG: 100 CAPSULE ORAL at 14:57

## 2022-10-03 RX ADMIN — SODIUM CHLORIDE, PRESERVATIVE FREE 20 MG: 5 INJECTION INTRAVENOUS at 09:24

## 2022-10-03 RX ADMIN — ACETAMINOPHEN 650 MG: 325 TABLET ORAL at 14:56

## 2022-10-03 RX ADMIN — GABAPENTIN 100 MG: 100 CAPSULE ORAL at 20:02

## 2022-10-03 RX ADMIN — POTASSIUM CHLORIDE 20 MEQ: 1500 TABLET, EXTENDED RELEASE ORAL at 09:26

## 2022-10-03 RX ADMIN — GUAIFENESIN 600 MG: 600 TABLET, EXTENDED RELEASE ORAL at 20:02

## 2022-10-03 RX ADMIN — PIPERACILLIN AND TAZOBACTAM 3375 MG: 3; .375 INJECTION, POWDER, FOR SOLUTION INTRAVENOUS at 11:34

## 2022-10-03 RX ADMIN — Medication 81 MG: at 09:25

## 2022-10-03 RX ADMIN — PIPERACILLIN AND TAZOBACTAM 3375 MG: 3; .375 INJECTION, POWDER, FOR SOLUTION INTRAVENOUS at 02:45

## 2022-10-03 ASSESSMENT — PAIN SCALES - GENERAL
PAINLEVEL_OUTOF10: 0
PAINLEVEL_OUTOF10: 6

## 2022-10-03 NOTE — PROGRESS NOTES
Comprehensive Nutrition Assessment    Type and Reason for Visit:  Reassess    Nutrition Recommendations/Plan:   Continue Current Diet- will continue to monitor tolerance to diet /adequacy of intake. Nutrition Assessment:    Chart reviewed. Pt was extubated 9/30/22. Pt is currently on a Regular diet and tolerating well. Pt reports intake of % of meals. Meds/labs reviewed. Nutrition Related Findings:    Labs/meds reviewed Wound Type: None       Current Nutrition Intake & Therapies:    Average Meal Intake: %  ADULT DIET; Regular      Anthropometric Measures:  Height: 5' 8\" (172.7 cm)  Ideal Body Weight (IBW): 140 lbs (64 kg)       Current Body Weight: 157 lb 10.1 oz (71.5 kg), 111.4 % IBW. Weight Source: Bed Scale  Current BMI (kg/m2): 24                          BMI Categories: Normal Weight (BMI 18.5-24. 9)    Estimated Daily Nutrient Needs:  Energy Requirements Based On: Kcal/kg  Weight Used for Energy Requirements: Current  Energy (kcal/day): 2000 kcal/day  Weight Used for Protein Requirements: Current  Protein (g/day): 85 g pro/day  Method Used for Fluid Requirements: 1 ml/kcal  Fluid (ml/day): 2000 mL or per MD    Nutrition Diagnosis:   Inadequate oral intake related to impaired respiratory function as evidenced by intubation   **Above Problem Resolved: Pt is extubated and eating % of meals    Nutrition Interventions:   Food and/or Nutrient Delivery: Continue Current Diet  Nutrition Education/Counseling: No recommendation at this time  Coordination of Nutrition Care: Continue to monitor while inpatient       Goals:  Previous Goal Met: Goal(s) Achieved  Goals: Meet at least 75% of estimated needs       Nutrition Monitoring and Evaluation:   Behavioral-Environmental Outcomes: None Identified  Food/Nutrient Intake Outcomes: Diet Advancement/Tolerance, Food and Nutrient Intake  Physical Signs/Symptoms Outcomes: Biochemical Data, Nutrition Focused Physical Findings, Weight    Discharge Planning:     Too soon to determine     [address], LD  Contact: [phone]

## 2022-10-03 NOTE — PROGRESS NOTES
INTENSIVE CARE UNIT  Resident Physician Progress Note    Patient - Debi Rodrigez  Date of Admission -  9/28/2022  5:16 PM  Date of Evaluation -  10/3/2022  Room and Bed Number -  2976/8738-24   Hospital Day - 5    SUBJECTIVE:     HISTORY OF PRESENT ILLNESS:    38 yo female PMH of opioid use and dependence on suboxone, HTN and anxiety. Presented to the ED with SOB for 3 days, along with fever, cough, congestion and body aches. She presented tachypnic and was desaturating to the low 80s. She was placed on NRB with improvement of her saturations and RR. She has a history of DVT provoked by pregnancy in the past, not on any AC at this time. CT PE was done, no PE but significant for severe lobar pneumonia in bilateral lower lobes and right middle lobe. COVID swab was negative. Labs showed mild hypokalemia, leukocytosis of 13.8 otherwise unremarkable. Started on azithromycin and rocephin for CAP. Patient had a worsening respiratory status while in the ED, was intubated due to hypoxia. Was initially sedated on propofol, versed and fentanyl. Hemodynamically stable. 9/29:intubated  9/30: extubated  10/1: azithromycin stopped, zosyn started      OVERNIGHT EVENTS:      No acute events overnight    TODAY:    Following commands and arousable. F.A.S.T. M. H.U.G.S. B.I.D. Feeding Diet: ADULT DIET; Regular  Fluids: PO  Family: updated last night  Analgesic: tylenol, cepacol   Sedation: None   Thrombo-prophylaxis: [x] Enoxaparin, [] Unfract.  Heparin Subcutaneously, [] EPC Cuffs  Mobility: as tolerated, walking with PT  Heads up: None  Ulcer prophylaxis: [x] PPI Agent, [] Y4Pixvd, [] Sucralfate, [] Other:  Glycemic control: None  Spontaneous breathing trial: extubated on 9/30   Bowel regimen/urine output: senokot  Indwelling catheter/lines: Peripheral R forearm  De-escalation: transfer out, d/c pepcid    afebrile  HR 63-90  MAP >100            OBJECTIVE:     VITAL SIGNS:  Patient Vitals for the past 8 hrs:   BP Temp Temp src Pulse Resp SpO2 Weight   10/03/22 0506 -- -- -- -- -- -- 157 lb 10.1 oz (71.5 kg)   10/03/22 0420 (!) 155/103 -- -- 72 17 93 % --   10/03/22 0400 (!) 171/114 98.5 °F (36.9 °C) Oral 73 22 95 % --   10/03/22 0300 (!) 146/98 -- -- 64 22 94 % --   10/03/22 0200 (!) 142/99 -- -- 63 22 95 % --   10/03/22 0100 (!) 144/95 -- -- 63 20 93 % --   10/03/22 0000 (!) 128/90 98.4 °F (36.9 °C) Oral 65 22 93 % --       Last Body weight:   Wt Readings from Last 3 Encounters:   10/03/22 157 lb 10.1 oz (71.5 kg)   22 157 lb (71.2 kg)   22 157 lb (71.2 kg)       Body Mass Index : Body mass index is 23.97 kg/m². Tmax over 24 hours: Temp (24hrs), Av.3 °F (36.8 °C), Min:97.7 °F (36.5 °C), Max:98.7 °F (37.1 °C)      Ins/Outs:   In: 284.4 [I.V.:102.3]  Out: -     PHYSICAL EXAM:  Constitutional: Appears well, no distress  EENT: PERRLA, EOMI,  no lesions, neck supple with midline trachea.    Neck: Supple, symmetrical, trachea midline  Respiratory: coarse breath sounds bilaterally  Cardiovascular: regular rate and rhythm, normal S1, S2, no murmur noted and 2+ pulses throughout  Abdomen: soft, nontender, nondistended, no masses or organomegaly  Extremities:  peripheral pulses normal, no pedal edema, no clubbing or cyanosis    MEDICATIONS:  Scheduled Meds:   potassium chloride  20 mEq Oral BID WC    piperacillin-tazobactam  3,375 mg IntraVENous Q8H    guaiFENesin  600 mg Oral BID    aspirin  81 mg Oral Daily    gabapentin  100 mg Oral TID    lisinopril  5 mg Oral Daily    enoxaparin  40 mg SubCUTAneous Daily    sodium chloride flush  5-40 mL IntraVENous 2 times per day    sennosides-docusate sodium  1 tablet Oral BID    famotidine (PEPCID) injection  20 mg IntraVENous BID       Continuous Infusions:   sodium chloride 10 mL/hr at 10/03/22 0653       PRN Meds:   benzocaine-menthol, 1 lozenge, Q2H PRN  sodium chloride, , PRN  acetaminophen, 650 mg, Q6H PRN   Or  acetaminophen, 650 mg, Q6H PRN  sodium chloride flush, 5-40 mL, PRN  ondansetron, 4 mg, Q8H PRN   Or  ondansetron, 4 mg, Q6H PRN  polyethylene glycol, 17 g, Daily PRN      SUPPORT DEVICES: [] Ventilator [] BIPAP  [] Nasal Cannula [x] Room Air    VENT SETTINGS (Comprehensive) (if applicable):    Vent Information  Ventilator ID: [id number]  Equipment Changed: HME  Additional Respiratory Assessments  Heart Rate: 72  Resp: 17  SpO2: 93 %  End Tidal CO2: 32 (%)  Position: Semi-Castro's  Humidification Source: HME  Cuff Pressure (cm H2O):  (mlt)  Lab Results   Component Value Date/Time    MODE [facility] 09/30/2022 04:17 AM           DATA:  Complete Blood Count:   Recent Labs     10/01/22  0352 10/02/22  0531 10/03/22  0424   WBC 10.0 8.4 6.7   RBC 3.21* 3.38* 3.62*   HGB 9.7* 10.4* 11.0*   HCT 29.7* 30.4* 32.8*   MCV 92.5 89.9 90.6   MCH 30.2 30.8 30.4   MCHC 32.7 34.2 33.5   RDW 14.1 13.8 13.8    217 269   MPV 12.0 11.2 11.4        Last 3 Blood Glucose:   Recent Labs     10/01/22  0352 10/02/22  0531 10/03/22  0424   GLUCOSE 104* 100* 83        PT/INR:    Lab Results   Component Value Date/Time    PROTIME 12.7 09/29/2022 04:17 AM    INR 1.3 09/29/2022 04:17 AM     PTT:    Lab Results   Component Value Date/Time    APTT 32.1 09/28/2022 05:45 PM       Basic Metabolic Profile:   Recent Labs     10/01/22  0352 10/02/22  0531 10/03/22  0424    139 140   K 3.2* 3.3* 3.5*    103 105   CO2 24 25 24   BUN 3* 4* 5*   CREATININE 0.31* 0.32* 0.32*   GLUCOSE 104* 100* 83       Liver Function:  No results for input(s): PROT, LABALBU, ALT, AST, GGT, ALKPHOS, BILITOT in the last 72 hours.       Magnesium:   Lab Results   Component Value Date/Time    MG 1.9 10/03/2022 04:24 AM    MG 1.8 10/02/2022 05:31 AM    MG 1.6 10/01/2022 03:52 AM     Phosphorus: No results found for: PHOS  Ionized Calcium:   Lab Results   Component Value Date/Time    CAION 1.08 09/30/2022 10:26 AM        Urinalysis:   Lab Results   Component Value Date/Time    NITRU NEGATIVE 09/28/2022 09:52 PM    COLORU Yellow 09/28/2022 09:52 PM    PHUR 7.5 09/28/2022 09:52 PM    WBCUA None 09/28/2022 09:52 PM    RBCUA 0 TO 2 09/28/2022 09:52 PM    MUCUS NOT REPORTED 05/01/2012 04:35 AM    TRICHOMONAS NOT REPORTED 05/01/2012 04:35 AM    YEAST NOT REPORTED 05/01/2012 04:35 AM    BACTERIA None 03/21/2022 10:17 AM    SPECGRAV 1.005 09/28/2022 09:52 PM    LEUKOCYTESUR NEGATIVE 09/28/2022 09:52 PM    UROBILINOGEN Normal 09/28/2022 09:52 PM    BILIRUBINUR NEGATIVE 09/28/2022 09:52 PM    BILIRUBINUR NEGATIVE 05/01/2012 04:35 AM    GLUCOSEU NEGATIVE 09/28/2022 09:52 PM    GLUCOSEU NEGATIVE 05/01/2012 04:35 AM    KETUA NEGATIVE 09/28/2022 09:52 PM    AMORPHOUS NOT REPORTED 05/01/2012 04:35 AM       HgBA1c:    Lab Results   Component Value Date/Time    LABA1C 5.1 03/21/2022 09:14 AM     TSH:    Lab Results   Component Value Date/Time    TSH 1.73 03/21/2022 09:14 AM     Lactic Acid: No results found for: LACTA   Troponin: No results for input(s): TROPONINI in the last 72 hours. Microbiology:  Urine Culture:  No components found for: CURINE  Blood Culture:  No components found for: CBLOOD, CFUNGUSBL  Sputum Culture:  No components found for: CSPUTUM    Other Labs:  Results for orders placed or performed during the hospital encounter of 09/28/22   Culture, Blood 1    Specimen: Blood   Result Value Ref Range    Specimen Description . BLOOD     Special Requests  L AC 10ML     Culture NO GROWTH 4 DAYS    Culture, Blood 1    Specimen: Blood   Result Value Ref Range    Specimen Description . BLOOD     Special Requests  r forearm 10ml     Culture NO GROWTH 4 DAYS    Culture, Urine    Specimen: Urine, clean catch   Result Value Ref Range    Specimen Description . CLEAN CATCH URINE     Culture NO SIGNIFICANT GROWTH    COVID-19, Rapid    Specimen: Nasopharyngeal Swab   Result Value Ref Range    Specimen Description . NASOPHARYNGEAL SWAB     SARS-CoV-2, Rapid Not Detected Not Detected   Culture, Respiratory    Specimen: Endotracheal   Result Value Ref Range Specimen Description . ENDOTRACHEAL     Direct Exam NO EPITHELIAL CELLS SEEN     Direct Exam >25 NEUTROPHILS/LPF     Direct Exam FEW GRAM POSITIVE COCCI IN PAIRS (A)     Culture STREPTOCOCCUS PNEUMONIAE LIGHT GROWTH (A)     Culture NO NORMAL COURTNEY        Susceptibility    Streptococcus pneumoniae - BACTERIAL SUSCEPTIBILITY PANEL NAKIA     penicillin meningitis,parenteral <=0.06 Sensitive      penicillin non meningitis,parenteral <=0.06 Sensitive      penicillin non meningitis, oral <=0.06 Sensitive      cefotaxime <=0.12 Sensitive      Cefotaxime-CSF only <=0.12 Sensitive      cefTRIAXone <=0.12 Sensitive      Ceftriaxone-CSF only <=0.12 Sensitive      clindamycin <=0.25 Sensitive      erythromycin <=0.12 Sensitive      levofloxacin 0.5 Sensitive      trimethoprim-sulfamethoxazole <=10 Sensitive      vancomycin 0.5 Sensitive    MRSA DNA Probe, Nasal    Specimen: Nasal   Result Value Ref Range    Specimen Description . NASAL SWAB     MRSA, DNA, Nasal NEGATIVE NEGATIVE   Culture, Blood 1    Specimen: Blood   Result Value Ref Range    Specimen Description . BLOOD     Special Requests LT FA 4ML     Culture NO GROWTH 4 DAYS    Sputum gram stain    Specimen: Endotracheal   Result Value Ref Range    Specimen Description . ENDOTRACHEAL     Direct Exam DUPLICATE ORDER    CBC with Auto Differential   Result Value Ref Range    WBC 13.8 (H) 3.5 - 11.3 k/uL    RBC 3.90 (L) 3.95 - 5.11 m/uL    Hemoglobin 11.8 (L) 11.9 - 15.1 g/dL    Hematocrit 35.4 (L) 36.3 - 47.1 %    MCV 90.8 82.6 - 102.9 fL    MCH 30.3 25.2 - 33.5 pg    MCHC 33.3 28.4 - 34.8 g/dL    RDW 14.2 11.8 - 14.4 %    Platelets 229 743 - 420 k/uL    MPV 11.7 8.1 - 13.5 fL    NRBC Automated 0.0 0.0 per 100 WBC    Immature Granulocytes 2 (H) 0 %    Seg Neutrophils 86 (H) 36 - 66 %    Lymphocytes 6 (L) 24 - 44 %    Monocytes 6 1 - 7 %    Eosinophils % 0 (L) 1 - 4 %    Basophils 0 0 - 2 %    Absolute Immature Granulocyte 0.28 0.00 - 0.30 k/uL    Segs Absolute 11.86 (H) 1.8 - 7.7 k/uL    Absolute Lymph # 0.83 (L) 1.0 - 4.8 k/uL    Absolute Mono # 0.83 (H) 0.1 - 0.8 k/uL    Absolute Eos # 0.00 0.0 - 0.4 k/uL    Basophils Absolute 0.00 0.0 - 0.2 k/uL    Morphology INCREASED BANDS PRESENT    Comprehensive Metabolic Panel   Result Value Ref Range    Glucose 112 (H) 70 - 99 mg/dL    BUN 9 6 - 20 mg/dL    Creatinine 0.68 0.50 - 0.90 mg/dL    Calcium 8.3 (L) 8.6 - 10.4 mg/dL    Sodium 131 (L) 135 - 144 mmol/L    Potassium 3.3 (L) 3.7 - 5.3 mmol/L    Chloride 98 98 - 107 mmol/L    CO2 21 20 - 31 mmol/L    Anion Gap 12 9 - 17 mmol/L    Alkaline Phosphatase 55 35 - 104 U/L    ALT 6 5 - 33 U/L    AST 14 <32 U/L    Total Bilirubin 0.9 0.3 - 1.2 mg/dL    Total Protein 6.9 6.4 - 8.3 g/dL    Albumin 3.5 3.5 - 5.2 g/dL    Albumin/Globulin Ratio 1.0 1.0 - 2.5    GFR Non-African American >60 >60 mL/min    GFR African American >60 >60 mL/min    GFR Comment         Lactate, Sepsis   Result Value Ref Range    Lactic Acid, Sepsis, Whole Blood 1.3 0.5 - 1.9 mmol/L   Protime-INR   Result Value Ref Range    Protime 12.7 (H) 9.1 - 12.3 sec    INR 1.3    APTT   Result Value Ref Range    PTT 32.1 (H) 20.5 - 30.5 sec   Troponin   Result Value Ref Range    Troponin, High Sensitivity <6 0 - 14 ng/L   Troponin   Result Value Ref Range    Troponin, High Sensitivity <6 0 - 14 ng/L   Urinalysis with Microscopic   Result Value Ref Range    Color, UA Yellow Yellow    Turbidity UA Clear Clear    Glucose, Ur NEGATIVE NEGATIVE    Bilirubin Urine NEGATIVE NEGATIVE    Ketones, Urine NEGATIVE NEGATIVE    Specific [place], UA 1.005 1.005 - 1.030    Urine Hgb NEGATIVE NEGATIVE    pH, UA 7.5 5.0 - 8.0    Protein, UA NEGATIVE NEGATIVE    Urobilinogen, Urine Normal Normal    Nitrite, Urine NEGATIVE NEGATIVE    Leukocyte Esterase, Urine NEGATIVE NEGATIVE    WBC, UA None 0 - 5 /HPF    RBC, UA 0 TO 2 0 - 4 /HPF    Epithelial Cells UA None 0 - 5 /HPF   Lactic Acid   Result Value Ref Range    Lactic Acid, Whole Blood 1.2 0.7 - 2.1 mmol/L   HCG Qualitative, Serum   Result Value Ref Range    hCG Qual NEGATIVE NEGATIVE   Brain Natriuretic Peptide   Result Value Ref Range    Pro- <300 pg/mL   SODIUM (POC)   Result Value Ref Range    POC Sodium 136 (L) 138 - 146 mmol/L   POTASSIUM (POC)   Result Value Ref Range    POC Potassium 3.2 (L) 3.5 - 4.5 mmol/L   CHLORIDE (POC)   Result Value Ref Range    POC Chloride 99 98 - 107 mmol/L   CALCIUM, IONIC (POC)   Result Value Ref Range    POC Ionized Calcium 1.08 (L) 1.15 - 1.33 mmol/L   BLOOD GAS, VENOUS   Result Value Ref Range    pH, Vipul 7.415 7.320 - 7.420    pCO2, Vipul 38.0 (L) 39 - 55 mm Hg    pO2, Vipul 48.8 30 - 50 mm Hg    HCO3, Venous 23.9 (L) 24 - 30 mmol/L    Positive Base Excess, Vipul 0.0 0.0 - 2.0 mmol/L    O2 Sat, Vipul 82.3 60.0 - 85.0 %    Carboxyhemoglobin 1.7 0 - 5 %    Pt Temp 37.0     FIO2 INFORMATION NOT PROVIDED    Lactate, Sepsis   Result Value Ref Range    Lactic Acid, Sepsis, Whole Blood 2.0 (H) 0.5 - 1.9 mmol/L   Lactate, Sepsis   Result Value Ref Range    Lactic Acid, Sepsis, Whole Blood 4.2 (H) 0.5 - 1.9 mmol/L   Protime-INR   Result Value Ref Range    Protime 12.7 (H) 9.1 - 12.3 sec    INR 1.3    Basic Metabolic Panel w/ Reflex to MG   Result Value Ref Range    Glucose 93 70 - 99 mg/dL    BUN 9 6 - 20 mg/dL    Creatinine 0.39 (L) 0.50 - 0.90 mg/dL    Calcium 7.8 (L) 8.6 - 10.4 mg/dL    Sodium 134 (L) 135 - 144 mmol/L    Potassium 3.7 3.7 - 5.3 mmol/L    Chloride 103 98 - 107 mmol/L    CO2 22 20 - 31 mmol/L    Anion Gap 9 9 - 17 mmol/L    GFR Non-African American >60 >60 mL/min    GFR African American >60 >60 mL/min    GFR Comment         CBC with Auto Differential   Result Value Ref Range    WBC 12.3 (H) 3.5 - 11.3 k/uL    RBC 3.39 (L) 3.95 - 5.11 m/uL    Hemoglobin 10.4 (L) 11.9 - 15.1 g/dL    Hematocrit 31.3 (L) 36.3 - 47.1 %    MCV 92.3 82.6 - 102.9 fL    MCH 30.7 25.2 - 33.5 pg    MCHC 33.2 28.4 - 34.8 g/dL    RDW 14.2 11.8 - 14.4 %    Platelets See Reflexed IPF Result 138 - 453 k/uL NRBC Automated 0.0 0.0 per 100 WBC    Immature Granulocytes 1 (H) 0 %    Seg Neutrophils 87 (H) 36 - 66 %    Lymphocytes 8 (L) 24 - 44 %    Monocytes 3 1 - 7 %    Eosinophils % 0 (L) 1 - 4 %    Basophils 1 0 - 2 %    Absolute Immature Granulocyte 0.12 0.00 - 0.30 k/uL    Segs Absolute 10.71 (H) 1.8 - 7.7 k/uL    Absolute Lymph # 0.98 (L) 1.0 - 4.8 k/uL    Absolute Mono # 0.37 0.1 - 0.8 k/uL    Absolute Eos # 0.00 0.0 - 0.4 k/uL    Basophils Absolute 0.12 0.0 - 0.2 k/uL    Morphology Normal    Immature Platelet Fraction   Result Value Ref Range    Platelet, Immature Fraction 6.3 1.1 - 10.3 %    Platelet, Fluorescence 127 (L) 138 - 453 k/uL   Basic Metabolic Panel w/ Reflex to MG   Result Value Ref Range    Glucose 78 70 - 99 mg/dL    BUN 7 6 - 20 mg/dL    Creatinine 0.37 (L) 0.50 - 0.90 mg/dL    Calcium 7.7 (L) 8.6 - 10.4 mg/dL    Sodium 137 135 - 144 mmol/L    Potassium 3.8 3.7 - 5.3 mmol/L    Chloride 107 98 - 107 mmol/L    CO2 19 (L) 20 - 31 mmol/L    Anion Gap 11 9 - 17 mmol/L    GFR Non-African American >60 >60 mL/min    GFR African American >60 >60 mL/min    GFR Comment         CBC with Auto Differential   Result Value Ref Range    WBC 10.4 3.5 - 11.3 k/uL    RBC 3.12 (L) 3.95 - 5.11 m/uL    Hemoglobin 9.4 (L) 11.9 - 15.1 g/dL    Hematocrit 29.0 (L) 36.3 - 47.1 %    MCV 92.9 82.6 - 102.9 fL    MCH 30.1 25.2 - 33.5 pg    MCHC 32.4 28.4 - 34.8 g/dL    RDW 14.3 11.8 - 14.4 %    Platelets 182 407 - 535 k/uL    MPV 12.1 8.1 - 13.5 fL    NRBC Automated 0.0 0.0 per 100 WBC    Immature Granulocytes 0 0 %    Seg Neutrophils 87 (H) 36 - 66 %    Lymphocytes 8 (L) 24 - 44 %    Monocytes 5 1 - 7 %    Eosinophils % 0 (L) 1 - 4 %    Basophils 0 0 - 2 %    Absolute Immature Granulocyte 0.00 0.00 - 0.30 k/uL    Segs Absolute 9.05 (H) 1.8 - 7.7 k/uL    Absolute Lymph # 0.83 (L) 1.0 - 4.8 k/uL    Absolute Mono # 0.52 0.1 - 0.8 k/uL    Absolute Eos # 0.00 0.0 - 0.4 k/uL    Basophils Absolute 0.00 0.0 - 0.2 k/uL    Morphology Normal    Calcium, Ionized   Result Value Ref Range    Calcium, Ionized 1.08 (L) 1.13 - 1.33 mmol/L   Lactic Acid   Result Value Ref Range    Lactic Acid, Whole Blood 0.9 0.7 - 2.1 mmol/L   Basic Metabolic Panel w/ Reflex to MG   Result Value Ref Range    Glucose 104 (H) 70 - 99 mg/dL    BUN 3 (L) 6 - 20 mg/dL    Creatinine 0.31 (L) 0.50 - 0.90 mg/dL    Calcium 8.1 (L) 8.6 - 10.4 mg/dL    Sodium 140 135 - 144 mmol/L    Potassium 3.2 (L) 3.7 - 5.3 mmol/L    Chloride 107 98 - 107 mmol/L    CO2 24 20 - 31 mmol/L    Anion Gap 9 9 - 17 mmol/L    GFR Non-African American >60 >60 mL/min    GFR African American >60 >60 mL/min    GFR Comment         CBC with Auto Differential   Result Value Ref Range    WBC 10.0 3.5 - 11.3 k/uL    RBC 3.21 (L) 3.95 - 5.11 m/uL    Hemoglobin 9.7 (L) 11.9 - 15.1 g/dL    Hematocrit 29.7 (L) 36.3 - 47.1 %    MCV 92.5 82.6 - 102.9 fL    MCH 30.2 25.2 - 33.5 pg    MCHC 32.7 28.4 - 34.8 g/dL    RDW 14.1 11.8 - 14.4 %    Platelets 841 773 - 707 k/uL    MPV 12.0 8.1 - 13.5 fL    NRBC Automated 0.0 0.0 per 100 WBC    Immature Granulocytes 1 (H) 0 %    Seg Neutrophils 84 (H) 36 - 65 %    Lymphocytes 7 (L) 24 - 43 %    Monocytes 7 3 - 12 %    Eosinophils % 1 1 - 4 %    Basophils 0 0 - 2 %    Absolute Immature Granulocyte 0.10 0.00 - 0.30 k/uL    Segs Absolute 8.40 (H) 1.50 - 8.10 k/uL    Absolute Lymph # 0.70 (L) 1.10 - 3.70 k/uL    Absolute Mono # 0.70 0.10 - 1.20 k/uL    Absolute Eos # 0.10 0.00 - 0.44 k/uL    Basophils Absolute 0.00 0.00 - 0.20 k/uL    Morphology Normal    Magnesium   Result Value Ref Range    Magnesium 1.6 1.6 - 2.6 mg/dL   Basic Metabolic Panel w/ Reflex to MG   Result Value Ref Range    Glucose 100 (H) 70 - 99 mg/dL    BUN 4 (L) 6 - 20 mg/dL    Creatinine 0.32 (L) 0.50 - 0.90 mg/dL    Calcium 7.8 (L) 8.6 - 10.4 mg/dL    Sodium 139 135 - 144 mmol/L    Potassium 3.3 (L) 3.7 - 5.3 mmol/L    Chloride 103 98 - 107 mmol/L    CO2 25 20 - 31 mmol/L    Anion Gap 11 9 - 17 mmol/L    GFR Non-African American >60 >60 mL/min    GFR African American >60 >60 mL/min    GFR Comment         CBC with Auto Differential   Result Value Ref Range    WBC 8.4 3.5 - 11.3 k/uL    RBC 3.38 (L) 3.95 - 5.11 m/uL    Hemoglobin 10.4 (L) 11.9 - 15.1 g/dL    Hematocrit 30.4 (L) 36.3 - 47.1 %    MCV 89.9 82.6 - 102.9 fL    MCH 30.8 25.2 - 33.5 pg    MCHC 34.2 28.4 - 34.8 g/dL    RDW 13.8 11.8 - 14.4 %    Platelets 659 478 - 242 k/uL    MPV 11.2 8.1 - 13.5 fL    NRBC Automated 0.0 0.0 per 100 WBC    Seg Neutrophils 74 (H) 36 - 65 %    Lymphocytes 13 (L) 24 - 43 %    Monocytes 10 3 - 12 %    Eosinophils % 2 1 - 4 %    Basophils 0 0 - 2 %    Immature Granulocytes 1 (H) 0 %    Segs Absolute 6.29 1.50 - 8.10 k/uL    Absolute Lymph # 1.05 (L) 1.10 - 3.70 k/uL    Absolute Mono # 0.81 0.10 - 1.20 k/uL    Absolute Eos # 0.19 0.00 - 0.44 k/uL    Basophils Absolute <0.03 0.00 - 0.20 k/uL    Absolute Immature Granulocyte 0.04 0.00 - 0.30 k/uL   Magnesium   Result Value Ref Range    Magnesium 1.8 1.6 - 2.6 mg/dL   Basic Metabolic Panel w/ Reflex to MG   Result Value Ref Range    Glucose 83 70 - 99 mg/dL    BUN 5 (L) 6 - 20 mg/dL    Creatinine 0.32 (L) 0.50 - 0.90 mg/dL    Calcium 8.3 (L) 8.6 - 10.4 mg/dL    Sodium 140 135 - 144 mmol/L    Potassium 3.5 (L) 3.7 - 5.3 mmol/L    Chloride 105 98 - 107 mmol/L    CO2 24 20 - 31 mmol/L    Anion Gap 11 9 - 17 mmol/L    GFR Non-African American >60 >60 mL/min    GFR African American >60 >60 mL/min    GFR Comment         CBC with Auto Differential   Result Value Ref Range    WBC 6.7 3.5 - 11.3 k/uL    RBC 3.62 (L) 3.95 - 5.11 m/uL    Hemoglobin 11.0 (L) 11.9 - 15.1 g/dL    Hematocrit 32.8 (L) 36.3 - 47.1 %    MCV 90.6 82.6 - 102.9 fL    MCH 30.4 25.2 - 33.5 pg    MCHC 33.5 28.4 - 34.8 g/dL    RDW 13.8 11.8 - 14.4 %    Platelets 440 854 - 170 k/uL    MPV 11.4 8.1 - 13.5 fL    NRBC Automated 0.0 0.0 per 100 WBC    Seg Neutrophils 59 36 - 65 %    Lymphocytes 24 24 - 43 %    Monocytes 13 (H) 3 - 12 % Eosinophils % 3 1 - 4 %    Basophils 0 0 - 2 %    Immature Granulocytes 1 (H) 0 %    Segs Absolute 3.98 1.50 - 8.10 k/uL    Absolute Lymph # 1.57 1.10 - 3.70 k/uL    Absolute Mono # 0.85 0.10 - 1.20 k/uL    Absolute Eos # 0.21 0.00 - 0.44 k/uL    Basophils Absolute <0.03 0.00 - 0.20 k/uL    Absolute Immature Granulocyte 0.03 0.00 - 0.30 k/uL   Magnesium   Result Value Ref Range    Magnesium 1.9 1.6 - 2.6 mg/dL   Venous Blood Gas, POC   Result Value Ref Range    pH, Vipul 7.402 7.320 - 7.430    pCO2, Vipul 40.1 (L) 41.0 - 51.0 mm Hg    pO2, Vipul 30.4 30.0 - 50.0 mm Hg    HCO3, Venous 24.9 22.0 - 29.0 mmol/L    Positive Base Excess, Vipul 0 0.0 - 3.0    O2 Sat, Vipul 58 (L) 60.0 - 85.0 %   Anion Gap (Calc) POC   Result Value Ref Range    Anion Gap 12 7 - 16 mmol/L   Arterial Blood Gas, POC   Result Value Ref Range    POC pH 7.381 7.350 - 7.450    POC pCO2 42.1 35.0 - 48.0 mm Hg    POC PO2 79.2 (L) 83.0 - 108.0 mm Hg    POC HCO3 25.0 21.0 - 28.0 mmol/L    Positive Base Excess, Art 0 0.0 - 3.0    POC O2 SAT 95 94.0 - 98.0 %    O2 Device/Flow/% Adult Ventilator     Nacho Test POSITIVE     Sample Site Arterial Line     Mode PRVC     FIO2 100.0    POCT Glucose   Result Value Ref Range    POC Glucose 97 74 - 100 mg/dL   Arterial Blood Gas, POC   Result Value Ref Range    POC pH 7.441 7.350 - 7.450    POC pCO2 35.6 35.0 - 48.0 mm Hg    POC PO2 121.2 (H) 83.0 - 108.0 mm Hg    POC HCO3 24.2 21.0 - 28.0 mmol/L    Positive Base Excess, Art 0 0.0 - 3.0    POC O2 SAT 99 (H) 94.0 - 98.0 %    O2 Device/Flow/% Adult Ventilator     Nacho Test POSITIVE     Sample Site Arterial Line     Mode PRVC     FIO2 100.0    POCT Glucose   Result Value Ref Range    POC Glucose 87 74 - 100 mg/dL   Arterial Blood Gas, POC   Result Value Ref Range    POC pH 7.362 7.350 - 7.450    POC pCO2 39.2 35.0 - 48.0 mm Hg    POC PO2 77.1 (L) 83.0 - 108.0 mm Hg    POC HCO3 22.2 21.0 - 28.0 mmol/L    Negative Base Excess, Art 3 (H) 0.0 - 2.0    POC O2 SAT 95 94.0 - 98.0 %    O2 Device/Flow/% Adult Ventilator     Nacho Test POSITIVE     Sample Site Left Radial Artery     Mode PRVC     FIO2 40.0    Lactic Acid, POC   Result Value Ref Range    POC Lactic Acid 0.66 0.56 - 1.39 mmol/L   POCT Glucose   Result Value Ref Range    POC Glucose 79 74 - 100 mg/dL   EKG 12 Lead   Result Value Ref Range    Ventricular Rate 90 BPM    Atrial Rate 90 BPM    P-R Interval 140 ms    QRS Duration 82 ms    Q-T Interval 354 ms    QTc Calculation (Bazett) 433 ms    P Axis 60 degrees    R Axis 52 degrees    T Axis 69 degrees       Radiology/Imaging:  XR CHEST PORTABLE   Final Result   No significant change from prior study. Airspace opacity in the right mid   and lower lung zones remains with right effusion. Patient has some   loculation at the right base. XR CHEST PORTABLE   Final Result   1. Apparent increase in airspace opacity in the mid to basilar right lung   likely due in part to passive atelectasis given apparent increase in   overlying and possibly loculated trace to small right pleural effusion. However, the appearance may be related to differences in layering of the   effusion. Superimposed pneumonia or aspiration may persist.   2. No significant change in left basilar atelectasis, pneumonia, and/or   aspiration. 3. Pulmonary vascular congestion and mild cardiomegaly. XR CHEST PORTABLE   Final Result   Similar patchy bibasilar infiltrates, greater on the right         XR CHEST PORTABLE   Final Result   Similar bibasilar opacities, greater on the right. XR ABDOMEN FOR NG/OG/NE TUBE PLACEMENT   Final Result   The enteric catheter tip terminates in the region of the proximal to mid   gastric body junction. XR CHEST PORTABLE   Final Result   ET tube measures 5.8 cm above the lila. Right basilar pneumonia, with mild increased lung expansion and improved   aeration at the right lung base.       New linear airspace disease at the left lung base likely representing   atelectasis. CT CHEST PULMONARY EMBOLISM W CONTRAST   Final Result   No evidence of pulmonary embolism or dissection. Dense consolidation within the lower lobes bilaterally, as well as in the   right middle lobe, most compatible with severe lobar pneumonia. Aspiration   would also be a consideration. This process must be followed to resolution to ensure that there is no   underlying neoplasm. XR CHEST PORTABLE   Final Result      Focal consolidative changes of right middle lobe and right lower lobe. Findings are likely suggestive of pneumonia.              ASSESSMENT:     Patient Active Problem List    Diagnosis Date Noted    Tachypnea 09/30/2022    Acute respiratory failure with hypoxia (HCC) 09/30/2022    Sepsis ([address] 09/28/2022    Pneumonia due to infectious organism 09/28/2022    Hypokalemia 09/28/2022    Pneumonia 09/28/2022    Chronic midline low back pain with right-sided sciatica 05/24/2022    Chronic neck pain 05/24/2022    Protein malnutrition ([address] 03/24/2022    Vitamin D deficiency 03/24/2022    Opioid dependence on agonist therapy ([address] 03/24/2022    Peripheral edema 03/24/2022    H/O bilateral salpingectomy 03/24/2022    Chronic allergic rhinitis 11/22/2021    Pre-eclampsia in third trimester 06/23/2021    Maternal antithrombin III deficiency complicating pregnancy ([address] 03/08/2021    H/O opioid abuse ([facility] 02/19/2021    Advanced maternal age in multigravida 02/10/2021    Dichorionic diamniotic twin pregnancy, antepartum 02/10/2021    Hx of preeclampsia, prior pregnancy, currently pregnant 02/10/2021    Degeneration of cervical intervertebral disc 12/21/2020    Gastro-esophageal reflux disease without esophagitis 12/21/2020    Generalized anxiety disorder 12/21/2020    Essential hypertension 12/21/2020    Opioid use, unspecified with other opioid-induced disorder (HCC)(Suboxone) 12/21/2020    History of opiate therapy 12/21/2020    Smoker 12/21/2020    Family

## 2022-10-03 NOTE — PLAN OF CARE
Problem: Respiratory - Adult  Goal: Achieves optimal ventilation and oxygenation  10/3/2022 0030 by Delmis Baltazar RN  Outcome: Progressing     Problem: Discharge Planning  Goal: Discharge to home or other facility with appropriate resources  10/3/2022 0030 by Delmis Baltazar RN  Outcome: Progressing     Problem: Pain  Goal: Verbalizes/displays adequate comfort level or baseline comfort level  10/3/2022 0030 by Delmis Baltazar RN  Outcome: Progressing     Problem: Skin/Tissue Integrity  Goal: Absence of new skin breakdown  Description: 1. Monitor for areas of redness and/or skin breakdown  2. Assess vascular access sites hourly  3. Every 4-6 hours minimum:  Change oxygen saturation probe site  4. Every 4-6 hours:  If on nasal continuous positive airway pressure, respiratory therapy assess nares and determine need for appliance change or resting period.   10/3/2022 0030 by Delmis Baltazar RN  Outcome: Progressing     Problem: Safety - Adult  Goal: Free from fall injury  10/3/2022 0030 by Delmis Baltazar RN  Outcome: Progressing  Flowsheets (Taken 10/3/2022 0024)  Free From Fall Injury: Instruct family/caregiver on patient safety     Problem: ABCDS Injury Assessment  Goal: Absence of physical injury  10/3/2022 0030 by Delmis Baltazar RN  Outcome: Progressing  Flowsheets (Taken 10/3/2022 0024)  Absence of Physical Injury: Implement safety measures based on patient assessment

## 2022-10-03 NOTE — CARE COORDINATION
Transitional planning. Spoke to pt, plans are to return home independently, has transportation. Monitor for Home O2 needs.

## 2022-10-03 NOTE — PLAN OF CARE
Problem: Respiratory - Adult  Goal: Achieves optimal ventilation and oxygenation  Outcome: Progressing     Problem: Discharge Planning  Goal: Discharge to home or other facility with appropriate resources  Outcome: Progressing     Problem: Pain  Goal: Verbalizes/displays adequate comfort level or baseline comfort level  Outcome: Progressing     Problem: Skin/Tissue Integrity  Goal: Absence of new skin breakdown  Outcome: Progressing     Problem: Safety - Adult  Goal: Free from fall injury  Outcome: Progressing     Problem: ABCDS Injury Assessment  Goal: Absence of physical injury  Outcome: Progressing     Problem: Nutrition Deficit:  Goal: Optimize nutritional status  Outcome: Progressing

## 2022-10-04 ENCOUNTER — TELEPHONE (OUTPATIENT)
Dept: PULMONOLOGY | Age: 42
End: 2022-10-04

## 2022-10-04 VITALS
HEIGHT: 68 IN | TEMPERATURE: 97.5 F | HEART RATE: 90 BPM | RESPIRATION RATE: 16 BRPM | BODY MASS INDEX: 23.89 KG/M2 | OXYGEN SATURATION: 92 % | SYSTOLIC BLOOD PRESSURE: 142 MMHG | WEIGHT: 157.63 LBS | DIASTOLIC BLOOD PRESSURE: 107 MMHG

## 2022-10-04 DIAGNOSIS — M54.2 CHRONIC NECK PAIN: ICD-10-CM

## 2022-10-04 DIAGNOSIS — G89.29 CHRONIC NECK PAIN: ICD-10-CM

## 2022-10-04 DIAGNOSIS — M54.41 CHRONIC MIDLINE LOW BACK PAIN WITH RIGHT-SIDED SCIATICA: ICD-10-CM

## 2022-10-04 DIAGNOSIS — G89.29 CHRONIC MIDLINE LOW BACK PAIN WITH RIGHT-SIDED SCIATICA: ICD-10-CM

## 2022-10-04 LAB
ABSOLUTE EOS #: 0.23 K/UL (ref 0–0.44)
ABSOLUTE IMMATURE GRANULOCYTE: 0.05 K/UL (ref 0–0.3)
ABSOLUTE LYMPH #: 1.05 K/UL (ref 1.1–3.7)
ABSOLUTE MONO #: 0.91 K/UL (ref 0.1–1.2)
ANION GAP SERPL CALCULATED.3IONS-SCNC: 7 MMOL/L (ref 9–17)
BASOPHILS # BLD: 0 % (ref 0–2)
BASOPHILS ABSOLUTE: <0.03 K/UL (ref 0–0.2)
BUN BLDV-MCNC: 5 MG/DL (ref 6–20)
CALCIUM SERPL-MCNC: 8.5 MG/DL (ref 8.6–10.4)
CHLORIDE BLD-SCNC: 105 MMOL/L (ref 98–107)
CO2: 27 MMOL/L (ref 20–31)
CREAT SERPL-MCNC: 0.28 MG/DL (ref 0.5–0.9)
CULTURE: NORMAL
EOSINOPHILS RELATIVE PERCENT: 3 % (ref 1–4)
GFR SERPL CREATININE-BSD FRML MDRD: >60 ML/MIN/1.73M2
GLUCOSE BLD-MCNC: 96 MG/DL (ref 70–99)
HCT VFR BLD CALC: 30.3 % (ref 36.3–47.1)
HEMOGLOBIN: 11 G/DL (ref 11.9–15.1)
IMMATURE GRANULOCYTES: 1 %
LYMPHOCYTES # BLD: 15 % (ref 24–43)
Lab: NORMAL
MCH RBC QN AUTO: 32.5 PG (ref 25.2–33.5)
MCHC RBC AUTO-ENTMCNC: 36.3 G/DL (ref 28.4–34.8)
MCV RBC AUTO: 89.6 FL (ref 82.6–102.9)
MONOCYTES # BLD: 13 % (ref 3–12)
NRBC AUTOMATED: 0 PER 100 WBC
PDW BLD-RTO: 14.7 % (ref 11.8–14.4)
PLATELET # BLD: 665 K/UL (ref 138–453)
PMV BLD AUTO: 12.5 FL (ref 8.1–13.5)
POTASSIUM SERPL-SCNC: 4 MMOL/L (ref 3.7–5.3)
RBC # BLD: 3.38 M/UL (ref 3.95–5.11)
RBC # BLD: ABNORMAL 10*6/UL
SEG NEUTROPHILS: 68 % (ref 36–65)
SEGMENTED NEUTROPHILS ABSOLUTE COUNT: 4.66 K/UL (ref 1.5–8.1)
SODIUM BLD-SCNC: 139 MMOL/L (ref 135–144)
SPECIMEN DESCRIPTION: NORMAL
WBC # BLD: 7.3 K/UL (ref 3.5–11.3)

## 2022-10-04 PROCEDURE — 94761 N-INVAS EAR/PLS OXIMETRY MLT: CPT

## 2022-10-04 PROCEDURE — 6370000000 HC RX 637 (ALT 250 FOR IP): Performed by: NURSE PRACTITIONER

## 2022-10-04 PROCEDURE — 36415 COLL VENOUS BLD VENIPUNCTURE: CPT

## 2022-10-04 PROCEDURE — 2580000003 HC RX 258: Performed by: STUDENT IN AN ORGANIZED HEALTH CARE EDUCATION/TRAINING PROGRAM

## 2022-10-04 PROCEDURE — 99239 HOSP IP/OBS DSCHRG MGMT >30: CPT | Performed by: INTERNAL MEDICINE

## 2022-10-04 PROCEDURE — 2700000000 HC OXYGEN THERAPY PER DAY

## 2022-10-04 PROCEDURE — 6370000000 HC RX 637 (ALT 250 FOR IP)

## 2022-10-04 PROCEDURE — 6370000000 HC RX 637 (ALT 250 FOR IP): Performed by: STUDENT IN AN ORGANIZED HEALTH CARE EDUCATION/TRAINING PROGRAM

## 2022-10-04 PROCEDURE — 80048 BASIC METABOLIC PNL TOTAL CA: CPT

## 2022-10-04 PROCEDURE — 85025 COMPLETE CBC W/AUTO DIFF WBC: CPT

## 2022-10-04 RX ORDER — LEVOFLOXACIN 750 MG/1
750 TABLET ORAL DAILY
Qty: 1 TABLET | Refills: 0 | Status: SHIPPED | OUTPATIENT
Start: 2022-10-04 | End: 2022-10-05

## 2022-10-04 RX ORDER — LEVOFLOXACIN 750 MG/1
750 TABLET ORAL DAILY
Qty: 1 TABLET | Refills: 0 | Status: SHIPPED | OUTPATIENT
Start: 2022-10-04 | End: 2022-10-04 | Stop reason: SDUPTHER

## 2022-10-04 RX ADMIN — SODIUM CHLORIDE, PRESERVATIVE FREE 10 ML: 5 INJECTION INTRAVENOUS at 08:18

## 2022-10-04 RX ADMIN — LISINOPRIL 5 MG: 5 TABLET ORAL at 08:16

## 2022-10-04 RX ADMIN — Medication 81 MG: at 08:16

## 2022-10-04 RX ADMIN — GABAPENTIN 100 MG: 100 CAPSULE ORAL at 08:30

## 2022-10-04 RX ADMIN — GUAIFENESIN 600 MG: 600 TABLET, EXTENDED RELEASE ORAL at 08:16

## 2022-10-04 RX ADMIN — ACETAMINOPHEN 650 MG: 325 TABLET ORAL at 08:16

## 2022-10-04 ASSESSMENT — PAIN SCALES - GENERAL
PAINLEVEL_OUTOF10: 0
PAINLEVEL_OUTOF10: 5
PAINLEVEL_OUTOF10: 5
PAINLEVEL_OUTOF10: 1

## 2022-10-04 ASSESSMENT — PAIN DESCRIPTION - DESCRIPTORS: DESCRIPTORS: NUMBNESS;TINGLING

## 2022-10-04 ASSESSMENT — PAIN DESCRIPTION - LOCATION: LOCATION: BACK

## 2022-10-04 NOTE — CARE COORDINATION
Met with patient to discuss transitional planning. Plan is home, denies needs.   Does not qualify for home O2    Discharge [facility] Case Management Department  Written by: Nikia Edgar RN    Patient Name: Rimma Nephew  Attending Provider: Donte Russell MD  Admit Date: 2022  5:16 PM  MRN: [id number]  Account: [de-identified]                     : 1980  Discharge Date: 10/4/2022        Disposition: home    Nikia Edgar RN

## 2022-10-04 NOTE — PLAN OF CARE
Problem: Respiratory - Adult  Goal: Achieves optimal ventilation and oxygenation  10/4/2022 0959 by Wilder Churchill RN  Outcome: Completed  10/4/2022 0955 by Wilder Churchill RN  Outcome: Progressing     Problem: Discharge Planning  Goal: Discharge to home or other facility with appropriate resources  10/4/2022 0959 by Wilder Churchill RN  Outcome: Completed  10/4/2022 0955 by Wilder Churchill RN  Outcome: Progressing  Flowsheets (Taken 10/4/2022 0800)  Discharge to home or other facility with appropriate resources: Identify barriers to discharge with patient and caregiver     Problem: Pain  Goal: Verbalizes/displays adequate comfort level or baseline comfort level  10/4/2022 0959 by Wilder Churchill RN  Outcome: Completed  10/4/2022 0955 by Wilder Churchill RN  Outcome: Progressing     Problem: Skin/Tissue Integrity  Goal: Absence of new skin breakdown  Description: 1. Monitor for areas of redness and/or skin breakdown  2. Assess vascular access sites hourly  3. Every 4-6 hours minimum:  Change oxygen saturation probe site  4. Every 4-6 hours:  If on nasal continuous positive airway pressure, respiratory therapy assess nares and determine need for appliance change or resting period.   10/4/2022 0959 by Wilder Churchill RN  Outcome: Completed  10/4/2022 0955 by Wilder Churchill RN  Outcome: Progressing     Problem: Safety - Adult  Goal: Free from fall injury  10/4/2022 0959 by Wilder Churchill RN  Outcome: Completed  10/4/2022 0955 by Wilder Churchill RN  Outcome: Progressing  Flowsheets (Taken 10/4/2022 0800)  Free From Fall Injury: Instruct family/caregiver on patient safety     Problem: ABCDS Injury Assessment  Goal: Absence of physical injury  10/4/2022 0959 by Wilder Churchill RN  Outcome: Completed  10/4/2022 0955 by Wilder Churchill RN  Outcome: Progressing  Flowsheets (Taken 10/4/2022 0800)  Absence of Physical Injury: Implement safety measures based on patient assessment     Problem: Nutrition Deficit:  Goal: Optimize nutritional status  10/4/2022 7997 by Jenni Montgomery RN  Outcome: Completed  10/4/2022 0955 by Jenni Montgomery, RN  Outcome: Progressing

## 2022-10-04 NOTE — TELEPHONE ENCOUNTER
----- Message from Blanca Wooten MD sent at 10/4/2022  3:46 PM EDT -----   Please have the patient follow-up in 6 weeks.   Thank you

## 2022-10-04 NOTE — DISCHARGE INSTR - COC
Continuity of Care Form    Patient Name: Rimma Nephpayal   :  1980  MRN:  [id number]    Admit date:  2022  Discharge date:  10/4/2022    Code Status Order: Full Code   Advance Directives:     Admitting Physician:  Donte Russell MD  PCP: Abeba Simmons, APRN - CNP    Discharging Nurse: Vel Lord RN  [address] Unit/Room#: 1255/1124-16  Discharging Unit Phone Number: [phone]    Emergency Contact:   Extended Emergency Contact Information  Primary Emergency Contact: Espinoza,Guy  Mobile Phone: [phone]  Relation: Parent  Secondary Emergency Contact: Marie Espinoza  Address: [address]  Home Phone: [phone]  Work Phone: [phone]  Relation: Parent    Past Surgical History:  Past Surgical History:   Procedure Laterality Date    TUBAL LIGATION  2022       Immunization History:   Immunization History   Administered Date(s) Administered    COVID-19, PFIZER PURPLE top, DILUTE for use, (age 15 y+), 30mcg/0.3mL 2021, 2021, 2021    Influenza Virus Vaccine 10/08/2014, 2020    Influenza, AFLURIA (age 1 yrs+), FLUZONE, (age 10 mo+), MDV, 0.5mL 2020    Influenza, FLUARIX, FLULAVAL, FLUZONE (age 10 mo+) AND AFLURIA, (age 1 y+), PF, 0.5mL 10/10/2021    Influenza, Trivalent Vaccine 6-35 Months, IM, Preservative Free 10/08/2014    MMR 2019       Active Problems:  Patient Active Problem List   Diagnosis Code    Fatigue R53.83    Depression F32. A    Lumbar radiculopathy, chronic M54.16    Degeneration of cervical intervertebral disc M50.30    Gastro-esophageal reflux disease without esophagitis K21.9    Generalized anxiety disorder F41.1    Essential hypertension I10    Opioid use, unspecified with other opioid-induced disorder (HCC)(Suboxone) Y35.690    History of opiate therapy Z92.29    Smoker F17.200    Family history of colon cancer Z80.0    Family history of breast cancer in mother Z80.2    Family history of early CAD Z80.55 Advanced maternal age in multigravida O12.46    Dichorionic diamniotic twin pregnancy, antepartum O30.46    H/O opioid abuse ([facility] [address] F11.11    Hx of preeclampsia, prior pregnancy, currently pregnant O09.299    Maternal antithrombin III deficiency complicating pregnancy ([facility] [address] O99.119, D68.59    Pre-eclampsia in third trimester O14.93    Chronic allergic rhinitis J30.9    Protein malnutrition ([facility] E46    Vitamin D deficiency E55.9    Opioid dependence on agonist therapy ([facility] [address] F11.20    Peripheral edema R60.9    H/O bilateral salpingectomy Z90.79    Chronic midline low back pain with right-sided sciatica M54.41, G89.29    Chronic neck pain M54.2, G89.29    Sepsis ([facility] A41.9    Pneumonia due to infectious organism J18.9    Hypokalemia E87.6    Pneumonia J18.9    Tachypnea R06.82    Acute respiratory failure with hypoxia (HCC) J96.01       Isolation/Infection:   Isolation            No Isolation          Patient Infection Status       Infection Onset Added Last Indicated Last Indicated By Review Planned Expiration Resolved Resolved By    COVID-19 (Rule Out) 09/28/22 09/28/22 09/28/22 Respiratory Panel, Molecular, with COVID-19 (Restricted: peds pts or suitable admitted adults) (Ordered) 10/08/22 10/12/22      Resolved    COVID-19 (Rule Out) 09/28/22 09/28/22 09/28/22 COVID-19, Rapid (Ordered)   09/28/22 Rule-Out Test Resulted            Nurse Assessment:  Last Vital Signs: BP (!) 145/115   Pulse 77   Temp 97.9 °F (36.6 °C) (Oral)   Resp 15   Ht 5' 8\" (1.727 m)   Wt 157 lb 10.1 oz (71.5 kg)   SpO2 93%   BMI 23.97 kg/m²     Last documented pain score (0-10 scale): Pain Level: 1  Last Weight:   Wt Readings from Last 1 Encounters:   10/03/22 157 lb 10.1 oz (71.5 kg)     Mental Status:  oriented, alert, coherent, and logical    IV Access:  - None    Nursing Mobility/ADLs:  Walking   Independent  Transfer  Independent  Bathing  Independent  Dressing  Independent  Toileting  Independent  Feeding  Independent  Med [facility] Delivery   whole    Wound Care Documentation and Therapy:        Elimination:  Continence: Bowel: Yes  Bladder: Yes  Urinary Catheter: None   Colostomy/Ileostomy/Ileal Conduit: No       Date of Last BM: 10/3/22    Intake/Output Summary (Last 24 hours) at 10/4/2022 1000  Last data filed at 10/4/2022 0900  Gross per 24 hour   Intake 1420 ml   Output --   Net 1420 ml     I/O last 3 completed shifts: In: 1588.2 [P.O.:1440; I.V.:48.1;  IV Piggyback:100]  Out: -     Safety Concerns:     None    Impairments/Disabilities:      None    Nutrition Therapy:  Current Nutrition Therapy:   - Oral Diet:  General    Routes of Feeding: Oral  Liquids: No Restrictions  Daily Fluid Restriction: no  Last Modified Barium Swallow with Video (Video Swallowing Test): not done    Treatments at the Time of Hospital Discharge:   Respiratory Treatments:   Oxygen Therapy:  {Therapy; copd oxygen:31199}  Ventilator:    { CC Vent QKVJ:833572436}    Rehab Therapies: {THERAPEUTIC INTERVENTION:1989747628}  Weight Bearing Status/Restrictions: { CC Weight Bearin}  Other Medical Equipment (for information only, NOT a DME order):  {EQUIPMENT:786166041}  Other Treatments: ***    Patient's personal belongings (please select all that are sent with patient):  {[facility] DME Belongings:267045921}    RN SIGNATURE:  Electronically signed by Andre Anguiano RN on 10/4/22 at 12:24 PM EDT    CASE MANAGEMENT/SOCIAL WORK SECTION    Inpatient Status Date: ***    Readmission Risk Assessment Score:  Readmission Risk              Risk of Unplanned Readmission:  15           Discharging to Facility/ Agency   Name:   Address:  Phone:  Fax:    Dialysis Facility (if applicable)   Name:  Address:  Dialysis Schedule:  Phone:  Fax:    / signature: {Esignature:829438218}    PHYSICIAN SECTION    Prognosis: {Prognosis:1074455162}    Condition at Discharge: [facility] [place] Patient Condition:282266162}    Rehab Potential (if transferring to Rehab): {Prognosis:7293703155}    Recommended Labs or Other Treatments After Discharge: ***    Physician Certification: I certify the above information and transfer of Snehal Small  is necessary for the continuing treatment of the diagnosis listed and that she requires {Admit to Appropriate Level of Care:60563} for {GREATER/LESS:784062187} 30 days.      Update Admission H&P: {CHP DME Changes in CPMBY:636695459}    PHYSICIAN SIGNATURE:  {Esignature:434532063}

## 2022-10-04 NOTE — PROGRESS NOTES
INTENSIVE CARE UNIT  Resident Physician Progress Note    Patient - Marguerite Boogie  Date of Admission -  9/28/2022  5:16 PM  Date of Evaluation -  10/4/2022  Room and Bed Number -  4849/0600-35   Hospital Day - 6    SUBJECTIVE:     HISTORY OF PRESENT ILLNESS:      40 yo female PMH of opioid use and dependence on suboxone, HTN and anxiety. Presented to the ED with SOB for 3 days, along with fever, cough, congestion and body aches. She presented tachypnic and was desaturating to the low 80s. She was placed on NRB with improvement of her saturations and RR. She has a history of DVT provoked by pregnancy in the past, not on any AC at this time. CT PE was done, no PE but significant for severe lobar pneumonia in bilateral lower lobes and right middle lobe. COVID swab was negative. Labs showed mild hypokalemia, leukocytosis of 13.8 otherwise unremarkable. Started on azithromycin and rocephin for CAP. Patient had a worsening respiratory status while in the ED, was intubated due to hypoxia. Was initially sedated on propofol, versed and fentanyl. Hemodynamically stable. 9/29:intubated  9/30: extubated  10/1: azithromycin stopped, zosyn started  10/4/22- home oxygen eval completed (not a candidate)      OVERNIGHT EVENTS:        No acute events overnight. On NC and spo2 aroundof 88-92  Denies any new complaints    TODAY:      Following commands and arousable. F.A.S.T. M. H.U.G.S. B.I.D. Feeding Diet: ADULT DIET; Regular  Fluids: PO  Family: updated at bedside  Analgesic: tylenol, cepacol   Sedation: None   Thrombo-prophylaxis: [x] Enoxaparin, [] Unfract.  Heparin Subcutaneously, [] EPC Cuffs  Mobility: as tolerated, walking with PT  Heads up: None  Ulcer prophylaxis: [] PPI Agent, [] G5Wjlvs, [] Sucralfate, [] Other: none  Glycemic control: None  Spontaneous breathing trial: extubated on 9/30   Bowel regimen/urine output: senokot  Indwelling catheter/lines: Peripheral R forearm  De-escalation: nasal canula and discharge home. afebrile  Hemodynamically stable    OBJECTIVE:     VITAL SIGNS:  Patient Vitals for the past 8 hrs:   BP Temp Temp src Pulse Resp SpO2   10/04/22 1200 (!) 142/107 97.5 °F (36.4 °C) Oral 90 16 92 %   10/04/22 1000 -- 97.9 °F (36.6 °C) Oral 66 16 96 %   10/04/22 0900 (!) 145/115 -- -- 77 15 93 %   10/04/22 0810 (!) 148/109 97.9 °F (36.6 °C) Oral 81 21 --   10/04/22 0800 -- -- -- 79 20 90 %   10/04/22 0700 -- -- -- 84 22 91 %       Last Body weight:   Wt Readings from Last 3 Encounters:   10/03/22 157 lb 10.1 oz (71.5 kg)   22 157 lb (71.2 kg)   22 157 lb (71.2 kg)       Body Mass Index : Body mass index is 23.97 kg/m². Tmax over 24 hours: Temp (24hrs), Av.9 °F (36.6 °C), Min:97.5 °F (36.4 °C), Max:98.1 °F (36.7 °C)      Ins/Outs:   In: 2230 [P.O.:2220; I.V.:10]  Out: 600 [Urine:600]    PHYSICAL EXAM:    Constitutional: Appears well, no distress  EENT: PERRLA, EOMI,  no lesions, neck supple with midline trachea. Neck: Supple, symmetrical, trachea midline. Respiratory: coarse breath sounds bilaterally  Cardiovascular: regular rate and rhythm, normal S1, S2, no murmur noted and 2+ pulses throughout. Abdomen: soft, nontender, nondistended, no masses or organomegaly.    Extremities:  peripheral pulses normal, no pedal edema, no clubbing or cyanosis    MEDICATIONS:  Scheduled Meds:   levoFLOXacin  750 mg Oral Daily    guaiFENesin  600 mg Oral BID    aspirin  81 mg Oral Daily    gabapentin  100 mg Oral TID    lisinopril  5 mg Oral Daily    enoxaparin  40 mg SubCUTAneous Daily    sodium chloride flush  5-40 mL IntraVENous 2 times per day    sennosides-docusate sodium  1 tablet Oral BID       Continuous Infusions:   sodium chloride 10 mL/hr at 10/03/22 0653       PRN Meds:   benzocaine-menthol, 1 lozenge, Q2H PRN  sodium chloride, , PRN  acetaminophen, 650 mg, Q6H PRN   Or  acetaminophen, 650 mg, Q6H PRN  sodium chloride flush, 5-40 mL, PRN  ondansetron, 4 mg, Q8H PRN Or  ondansetron, 4 mg, Q6H PRN  polyethylene glycol, 17 g, Daily PRN      SUPPORT DEVICES: [] Ventilator [] BIPAP  [] Nasal Cannula [x] Room Air    VENT SETTINGS (Comprehensive) (if applicable):    Vent Information  Ventilator ID: [id number]  Equipment Changed: HME  Additional Respiratory Assessments  Heart Rate: 90  Resp: 16  SpO2: 92 %  End Tidal CO2: 32 (%)  Position: Semi-Castro's  Humidification Source: HME  Cuff Pressure (cm H2O):  (mlt)  Lab Results   Component Value Date/Time    MODE [facility] 09/30/2022 04:17 AM         DATA:  Complete Blood Count:   Recent Labs     10/02/22  0531 10/03/22  0424 10/04/22  0555   WBC 8.4 6.7 7.3   RBC 3.38* 3.62* 3.38*   HGB 10.4* 11.0* 11.0*   HCT 30.4* 32.8* 30.3*   MCV 89.9 90.6 89.6   MCH 30.8 30.4 32.5   MCHC 34.2 33.5 36.3*   RDW 13.8 13.8 14.7*    269 665*   MPV 11.2 11.4 12.5        Last 3 Blood Glucose:   Recent Labs     10/02/22  0531 10/03/22  0424 10/04/22  0555   GLUCOSE 100* 83 96        PT/INR:    Lab Results   Component Value Date/Time    PROTIME 12.7 09/29/2022 04:17 AM    INR 1.3 09/29/2022 04:17 AM     PTT:    Lab Results   Component Value Date/Time    APTT 32.1 09/28/2022 05:45 PM       Basic Metabolic Profile:   Recent Labs     10/02/22  0531 10/03/22  0424 10/04/22  0555    140 139   K 3.3* 3.5* 4.0    105 105   CO2 25 24 27   BUN 4* 5* 5*   CREATININE 0.32* 0.32* 0.28*   GLUCOSE 100* 83 96       Liver Function:  No results for input(s): PROT, LABALBU, ALT, AST, GGT, ALKPHOS, BILITOT in the last 72 hours.       Magnesium:   Lab Results   Component Value Date/Time    MG 1.9 10/03/2022 04:24 AM    MG 1.8 10/02/2022 05:31 AM    MG 1.6 10/01/2022 03:52 AM     Phosphorus: No results found for: PHOS  Ionized Calcium:   Lab Results   Component Value Date/Time    CAION 1.08 09/30/2022 10:26 AM        Urinalysis:   Lab Results   Component Value Date/Time    NITRU NEGATIVE 09/28/2022 09:52 PM    COLORU Yellow 09/28/2022 09:52 PM    PHUR 7.5 09/28/2022 09:52 PM    WBCUA None 09/28/2022 09:52 PM    RBCUA 0 TO 2 09/28/2022 09:52 PM    MUCUS NOT REPORTED 05/01/2012 04:35 AM    TRICHOMONAS NOT REPORTED 05/01/2012 04:35 AM    YEAST NOT REPORTED 05/01/2012 04:35 AM    BACTERIA None 03/21/2022 10:17 AM    SPECGRAV 1.005 09/28/2022 09:52 PM    LEUKOCYTESUR NEGATIVE 09/28/2022 09:52 PM    UROBILINOGEN Normal 09/28/2022 09:52 PM    BILIRUBINUR NEGATIVE 09/28/2022 09:52 PM    BILIRUBINUR NEGATIVE 05/01/2012 04:35 AM    GLUCOSEU NEGATIVE 09/28/2022 09:52 PM    GLUCOSEU NEGATIVE 05/01/2012 04:35 AM    KETUA NEGATIVE 09/28/2022 09:52 PM    AMORPHOUS NOT REPORTED 05/01/2012 04:35 AM       HgBA1c:    Lab Results   Component Value Date/Time    LABA1C 5.1 03/21/2022 09:14 AM     TSH:    Lab Results   Component Value Date/Time    TSH 1.73 03/21/2022 09:14 AM     Lactic Acid: No results found for: LACTA   Troponin: No results for input(s): TROPONINI in the last 72 hours. Microbiology:  Urine Culture:  No components found for: CURINE  Blood Culture:  No components found for: CBLOOD, CFUNGUSBL  Sputum Culture:  No components found for: CSPUTUM    Other Labs:  Results for orders placed or performed during the hospital encounter of 09/28/22   Culture, Blood 1    Specimen: Blood   Result Value Ref Range    Specimen Description . BLOOD     Special Requests  L AC 10ML     Culture NO GROWTH 5 DAYS    Culture, Blood 1    Specimen: Blood   Result Value Ref Range    Specimen Description . BLOOD     Special Requests  r forearm 10ml     Culture NO GROWTH 5 DAYS    Culture, Urine    Specimen: Urine, clean catch   Result Value Ref Range    Specimen Description . CLEAN CATCH URINE     Culture NO SIGNIFICANT GROWTH    COVID-19, Rapid    Specimen: Nasopharyngeal Swab   Result Value Ref Range    Specimen Description . NASOPHARYNGEAL SWAB     SARS-CoV-2, Rapid Not Detected Not Detected   Culture, Respiratory    Specimen: Endotracheal   Result Value Ref Range    Specimen Description Mona Vora ENDOTRACHEAL     Direct Exam NO EPITHELIAL CELLS SEEN     Direct Exam >25 NEUTROPHILS/LPF     Direct Exam FEW GRAM POSITIVE COCCI IN PAIRS (A)     Culture STREPTOCOCCUS PNEUMONIAE LIGHT GROWTH (A)     Culture NO NORMAL COURTNEY        Susceptibility    Streptococcus pneumoniae - BACTERIAL SUSCEPTIBILITY PANEL NAKIA     penicillin meningitis,parenteral <=0.06 Sensitive      penicillin non meningitis,parenteral <=0.06 Sensitive      penicillin non meningitis, oral <=0.06 Sensitive      cefotaxime <=0.12 Sensitive      Cefotaxime-CSF only <=0.12 Sensitive      cefTRIAXone <=0.12 Sensitive      Ceftriaxone-CSF only <=0.12 Sensitive      clindamycin <=0.25 Sensitive      erythromycin <=0.12 Sensitive      levofloxacin 0.5 Sensitive      trimethoprim-sulfamethoxazole <=10 Sensitive      vancomycin 0.5 Sensitive    MRSA DNA Probe, Nasal    Specimen: Nasal   Result Value Ref Range    Specimen Description . NASAL SWAB     MRSA, DNA, Nasal NEGATIVE NEGATIVE   Culture, Blood 1    Specimen: Blood   Result Value Ref Range    Specimen Description . BLOOD     Special Requests LT FA 4ML     Culture NO GROWTH 5 DAYS    Sputum gram stain    Specimen: Endotracheal   Result Value Ref Range    Specimen Description . ENDOTRACHEAL     Direct Exam DUPLICATE ORDER    CBC with Auto Differential   Result Value Ref Range    WBC 13.8 (H) 3.5 - 11.3 k/uL    RBC 3.90 (L) 3.95 - 5.11 m/uL    Hemoglobin 11.8 (L) 11.9 - 15.1 g/dL    Hematocrit 35.4 (L) 36.3 - 47.1 %    MCV 90.8 82.6 - 102.9 fL    MCH 30.3 25.2 - 33.5 pg    MCHC 33.3 28.4 - 34.8 g/dL    RDW 14.2 11.8 - 14.4 %    Platelets 562 796 - 133 k/uL    MPV 11.7 8.1 - 13.5 fL    NRBC Automated 0.0 0.0 per 100 WBC    Immature Granulocytes 2 (H) 0 %    Seg Neutrophils 86 (H) 36 - 66 %    Lymphocytes 6 (L) 24 - 44 %    Monocytes 6 1 - 7 %    Eosinophils % 0 (L) 1 - 4 %    Basophils 0 0 - 2 %    Absolute Immature Granulocyte 0.28 0.00 - 0.30 k/uL    Segs Absolute 11.86 (H) 1.8 - 7.7 k/uL    Absolute Lymph # 0.83 (L) 1.0 - 4.8 k/uL    Absolute Mono # 0.83 (H) 0.1 - 0.8 k/uL    Absolute Eos # 0.00 0.0 - 0.4 k/uL    Basophils Absolute 0.00 0.0 - 0.2 k/uL    Morphology INCREASED BANDS PRESENT    Comprehensive Metabolic Panel   Result Value Ref Range    Glucose 112 (H) 70 - 99 mg/dL    BUN 9 6 - 20 mg/dL    Creatinine 0.68 0.50 - 0.90 mg/dL    Calcium 8.3 (L) 8.6 - 10.4 mg/dL    Sodium 131 (L) 135 - 144 mmol/L    Potassium 3.3 (L) 3.7 - 5.3 mmol/L    Chloride 98 98 - 107 mmol/L    CO2 21 20 - 31 mmol/L    Anion Gap 12 9 - 17 mmol/L    Alkaline Phosphatase 55 35 - 104 U/L    ALT 6 5 - 33 U/L    AST 14 <32 U/L    Total Bilirubin 0.9 0.3 - 1.2 mg/dL    Total Protein 6.9 6.4 - 8.3 g/dL    Albumin 3.5 3.5 - 5.2 g/dL    Albumin/Globulin Ratio 1.0 1.0 - 2.5    GFR Non-African American >60 >60 mL/min    GFR African American >60 >60 mL/min    GFR Comment         Lactate, Sepsis   Result Value Ref Range    Lactic Acid, Sepsis, Whole Blood 1.3 0.5 - 1.9 mmol/L   Protime-INR   Result Value Ref Range    Protime 12.7 (H) 9.1 - 12.3 sec    INR 1.3    APTT   Result Value Ref Range    PTT 32.1 (H) 20.5 - 30.5 sec   Troponin   Result Value Ref Range    Troponin, High Sensitivity <6 0 - 14 ng/L   Troponin   Result Value Ref Range    Troponin, High Sensitivity <6 0 - 14 ng/L   Urinalysis with Microscopic   Result Value Ref Range    Color, UA Yellow Yellow    Turbidity UA Clear Clear    Glucose, Ur NEGATIVE NEGATIVE    Bilirubin Urine NEGATIVE NEGATIVE    Ketones, Urine NEGATIVE NEGATIVE    Specific [place], UA 1.005 1.005 - 1.030    Urine Hgb NEGATIVE NEGATIVE    pH, UA 7.5 5.0 - 8.0    Protein, UA NEGATIVE NEGATIVE    Urobilinogen, Urine Normal Normal    Nitrite, Urine NEGATIVE NEGATIVE    Leukocyte Esterase, Urine NEGATIVE NEGATIVE    WBC, UA None 0 - 5 /HPF    RBC, UA 0 TO 2 0 - 4 /HPF    Epithelial Cells UA None 0 - 5 /HPF   Lactic Acid   Result Value Ref Range    Lactic Acid, Whole Blood 1.2 0.7 - 2.1 mmol/L   HCG Qualitative, Serum   Result Value Ref Range    hCG Qual NEGATIVE NEGATIVE   Brain Natriuretic Peptide   Result Value Ref Range    Pro- <300 pg/mL   SODIUM (POC)   Result Value Ref Range    POC Sodium 136 (L) 138 - 146 mmol/L   POTASSIUM (POC)   Result Value Ref Range    POC Potassium 3.2 (L) 3.5 - 4.5 mmol/L   CHLORIDE (POC)   Result Value Ref Range    POC Chloride 99 98 - 107 mmol/L   CALCIUM, IONIC (POC)   Result Value Ref Range    POC Ionized Calcium 1.08 (L) 1.15 - 1.33 mmol/L   BLOOD GAS, VENOUS   Result Value Ref Range    pH, Vipul 7.415 7.320 - 7.420    pCO2, Vipul 38.0 (L) 39 - 55 mm Hg    pO2, Vipul 48.8 30 - 50 mm Hg    HCO3, Venous 23.9 (L) 24 - 30 mmol/L    Positive Base Excess, Vipul 0.0 0.0 - 2.0 mmol/L    O2 Sat, Vipul 82.3 60.0 - 85.0 %    Carboxyhemoglobin 1.7 0 - 5 %    Pt Temp 37.0     FIO2 INFORMATION NOT PROVIDED    Lactate, Sepsis   Result Value Ref Range    Lactic Acid, Sepsis, Whole Blood 2.0 (H) 0.5 - 1.9 mmol/L   Lactate, Sepsis   Result Value Ref Range    Lactic Acid, Sepsis, Whole Blood 4.2 (H) 0.5 - 1.9 mmol/L   Protime-INR   Result Value Ref Range    Protime 12.7 (H) 9.1 - 12.3 sec    INR 1.3    Basic Metabolic Panel w/ Reflex to MG   Result Value Ref Range    Glucose 93 70 - 99 mg/dL    BUN 9 6 - 20 mg/dL    Creatinine 0.39 (L) 0.50 - 0.90 mg/dL    Calcium 7.8 (L) 8.6 - 10.4 mg/dL    Sodium 134 (L) 135 - 144 mmol/L    Potassium 3.7 3.7 - 5.3 mmol/L    Chloride 103 98 - 107 mmol/L    CO2 22 20 - 31 mmol/L    Anion Gap 9 9 - 17 mmol/L    GFR Non-African American >60 >60 mL/min    GFR African American >60 >60 mL/min    GFR Comment         CBC with Auto Differential   Result Value Ref Range    WBC 12.3 (H) 3.5 - 11.3 k/uL    RBC 3.39 (L) 3.95 - 5.11 m/uL    Hemoglobin 10.4 (L) 11.9 - 15.1 g/dL    Hematocrit 31.3 (L) 36.3 - 47.1 %    MCV 92.3 82.6 - 102.9 fL    MCH 30.7 25.2 - 33.5 pg    MCHC 33.2 28.4 - 34.8 g/dL    RDW 14.2 11.8 - 14.4 %    Platelets See Reflexed IPF Result 138 - 453 k/uL    NRBC Automated 0.0 0.0 per 100 WBC    Immature Granulocytes 1 (H) 0 %    Seg Neutrophils 87 (H) 36 - 66 %    Lymphocytes 8 (L) 24 - 44 %    Monocytes 3 1 - 7 %    Eosinophils % 0 (L) 1 - 4 %    Basophils 1 0 - 2 %    Absolute Immature Granulocyte 0.12 0.00 - 0.30 k/uL    Segs Absolute 10.71 (H) 1.8 - 7.7 k/uL    Absolute Lymph # 0.98 (L) 1.0 - 4.8 k/uL    Absolute Mono # 0.37 0.1 - 0.8 k/uL    Absolute Eos # 0.00 0.0 - 0.4 k/uL    Basophils Absolute 0.12 0.0 - 0.2 k/uL    Morphology Normal    Immature Platelet Fraction   Result Value Ref Range    Platelet, Immature Fraction 6.3 1.1 - 10.3 %    Platelet, Fluorescence 127 (L) 138 - 453 k/uL   Basic Metabolic Panel w/ Reflex to MG   Result Value Ref Range    Glucose 78 70 - 99 mg/dL    BUN 7 6 - 20 mg/dL    Creatinine 0.37 (L) 0.50 - 0.90 mg/dL    Calcium 7.7 (L) 8.6 - 10.4 mg/dL    Sodium 137 135 - 144 mmol/L    Potassium 3.8 3.7 - 5.3 mmol/L    Chloride 107 98 - 107 mmol/L    CO2 19 (L) 20 - 31 mmol/L    Anion Gap 11 9 - 17 mmol/L    GFR Non-African American >60 >60 mL/min    GFR African American >60 >60 mL/min    GFR Comment         CBC with Auto Differential   Result Value Ref Range    WBC 10.4 3.5 - 11.3 k/uL    RBC 3.12 (L) 3.95 - 5.11 m/uL    Hemoglobin 9.4 (L) 11.9 - 15.1 g/dL    Hematocrit 29.0 (L) 36.3 - 47.1 %    MCV 92.9 82.6 - 102.9 fL    MCH 30.1 25.2 - 33.5 pg    MCHC 32.4 28.4 - 34.8 g/dL    RDW 14.3 11.8 - 14.4 %    Platelets 664 552 - 703 k/uL    MPV 12.1 8.1 - 13.5 fL    NRBC Automated 0.0 0.0 per 100 WBC    Immature Granulocytes 0 0 %    Seg Neutrophils 87 (H) 36 - 66 %    Lymphocytes 8 (L) 24 - 44 %    Monocytes 5 1 - 7 %    Eosinophils % 0 (L) 1 - 4 %    Basophils 0 0 - 2 %    Absolute Immature Granulocyte 0.00 0.00 - 0.30 k/uL    Segs Absolute 9.05 (H) 1.8 - 7.7 k/uL    Absolute Lymph # 0.83 (L) 1.0 - 4.8 k/uL    Absolute Mono # 0.52 0.1 - 0.8 k/uL    Absolute Eos # 0.00 0.0 - 0.4 k/uL    Basophils Absolute 0.00 0.0 - 0.2 k/uL    Morphology Normal Calcium, Ionized   Result Value Ref Range    Calcium, Ionized 1.08 (L) 1.13 - 1.33 mmol/L   Lactic Acid   Result Value Ref Range    Lactic Acid, Whole Blood 0.9 0.7 - 2.1 mmol/L   Basic Metabolic Panel w/ Reflex to MG   Result Value Ref Range    Glucose 104 (H) 70 - 99 mg/dL    BUN 3 (L) 6 - 20 mg/dL    Creatinine 0.31 (L) 0.50 - 0.90 mg/dL    Calcium 8.1 (L) 8.6 - 10.4 mg/dL    Sodium 140 135 - 144 mmol/L    Potassium 3.2 (L) 3.7 - 5.3 mmol/L    Chloride 107 98 - 107 mmol/L    CO2 24 20 - 31 mmol/L    Anion Gap 9 9 - 17 mmol/L    GFR Non-African American >60 >60 mL/min    GFR African American >60 >60 mL/min    GFR Comment         CBC with Auto Differential   Result Value Ref Range    WBC 10.0 3.5 - 11.3 k/uL    RBC 3.21 (L) 3.95 - 5.11 m/uL    Hemoglobin 9.7 (L) 11.9 - 15.1 g/dL    Hematocrit 29.7 (L) 36.3 - 47.1 %    MCV 92.5 82.6 - 102.9 fL    MCH 30.2 25.2 - 33.5 pg    MCHC 32.7 28.4 - 34.8 g/dL    RDW 14.1 11.8 - 14.4 %    Platelets 765 599 - 411 k/uL    MPV 12.0 8.1 - 13.5 fL    NRBC Automated 0.0 0.0 per 100 WBC    Immature Granulocytes 1 (H) 0 %    Seg Neutrophils 84 (H) 36 - 65 %    Lymphocytes 7 (L) 24 - 43 %    Monocytes 7 3 - 12 %    Eosinophils % 1 1 - 4 %    Basophils 0 0 - 2 %    Absolute Immature Granulocyte 0.10 0.00 - 0.30 k/uL    Segs Absolute 8.40 (H) 1.50 - 8.10 k/uL    Absolute Lymph # 0.70 (L) 1.10 - 3.70 k/uL    Absolute Mono # 0.70 0.10 - 1.20 k/uL    Absolute Eos # 0.10 0.00 - 0.44 k/uL    Basophils Absolute 0.00 0.00 - 0.20 k/uL    Morphology Normal    Magnesium   Result Value Ref Range    Magnesium 1.6 1.6 - 2.6 mg/dL   Basic Metabolic Panel w/ Reflex to MG   Result Value Ref Range    Glucose 100 (H) 70 - 99 mg/dL    BUN 4 (L) 6 - 20 mg/dL    Creatinine 0.32 (L) 0.50 - 0.90 mg/dL    Calcium 7.8 (L) 8.6 - 10.4 mg/dL    Sodium 139 135 - 144 mmol/L    Potassium 3.3 (L) 3.7 - 5.3 mmol/L    Chloride 103 98 - 107 mmol/L    CO2 25 20 - 31 mmol/L    Anion Gap 11 9 - 17 mmol/L    GFR Non-African American >60 >60 mL/min    GFR African American >60 >60 mL/min    GFR Comment         CBC with Auto Differential   Result Value Ref Range    WBC 8.4 3.5 - 11.3 k/uL    RBC 3.38 (L) 3.95 - 5.11 m/uL    Hemoglobin 10.4 (L) 11.9 - 15.1 g/dL    Hematocrit 30.4 (L) 36.3 - 47.1 %    MCV 89.9 82.6 - 102.9 fL    MCH 30.8 25.2 - 33.5 pg    MCHC 34.2 28.4 - 34.8 g/dL    RDW 13.8 11.8 - 14.4 %    Platelets 779 348 - 322 k/uL    MPV 11.2 8.1 - 13.5 fL    NRBC Automated 0.0 0.0 per 100 WBC    Seg Neutrophils 74 (H) 36 - 65 %    Lymphocytes 13 (L) 24 - 43 %    Monocytes 10 3 - 12 %    Eosinophils % 2 1 - 4 %    Basophils 0 0 - 2 %    Immature Granulocytes 1 (H) 0 %    Segs Absolute 6.29 1.50 - 8.10 k/uL    Absolute Lymph # 1.05 (L) 1.10 - 3.70 k/uL    Absolute Mono # 0.81 0.10 - 1.20 k/uL    Absolute Eos # 0.19 0.00 - 0.44 k/uL    Basophils Absolute <0.03 0.00 - 0.20 k/uL    Absolute Immature Granulocyte 0.04 0.00 - 0.30 k/uL   Magnesium   Result Value Ref Range    Magnesium 1.8 1.6 - 2.6 mg/dL   Basic Metabolic Panel w/ Reflex to MG   Result Value Ref Range    Glucose 83 70 - 99 mg/dL    BUN 5 (L) 6 - 20 mg/dL    Creatinine 0.32 (L) 0.50 - 0.90 mg/dL    Calcium 8.3 (L) 8.6 - 10.4 mg/dL    Sodium 140 135 - 144 mmol/L    Potassium 3.5 (L) 3.7 - 5.3 mmol/L    Chloride 105 98 - 107 mmol/L    CO2 24 20 - 31 mmol/L    Anion Gap 11 9 - 17 mmol/L    GFR Non-African American >60 >60 mL/min    GFR African American >60 >60 mL/min    GFR Comment         CBC with Auto Differential   Result Value Ref Range    WBC 6.7 3.5 - 11.3 k/uL    RBC 3.62 (L) 3.95 - 5.11 m/uL    Hemoglobin 11.0 (L) 11.9 - 15.1 g/dL    Hematocrit 32.8 (L) 36.3 - 47.1 %    MCV 90.6 82.6 - 102.9 fL    MCH 30.4 25.2 - 33.5 pg    MCHC 33.5 28.4 - 34.8 g/dL    RDW 13.8 11.8 - 14.4 %    Platelets 119 656 - 386 k/uL    MPV 11.4 8.1 - 13.5 fL    NRBC Automated 0.0 0.0 per 100 WBC    Seg Neutrophils 59 36 - 65 %    Lymphocytes 24 24 - 43 %    Monocytes 13 (H) 3 - 12 % Eosinophils % 3 1 - 4 %    Basophils 0 0 - 2 %    Immature Granulocytes 1 (H) 0 %    Segs Absolute 3.98 1.50 - 8.10 k/uL    Absolute Lymph # 1.57 1.10 - 3.70 k/uL    Absolute Mono # 0.85 0.10 - 1.20 k/uL    Absolute Eos # 0.21 0.00 - 0.44 k/uL    Basophils Absolute <0.03 0.00 - 0.20 k/uL    Absolute Immature Granulocyte 0.03 0.00 - 0.30 k/uL   Magnesium   Result Value Ref Range    Magnesium 1.9 1.6 - 2.6 mg/dL   Basic Metabolic Panel w/ Reflex to MG   Result Value Ref Range    Glucose 96 70 - 99 mg/dL    BUN 5 (L) 6 - 20 mg/dL    Creatinine 0.28 (L) 0.50 - 0.90 mg/dL    Calcium 8.5 (L) 8.6 - 10.4 mg/dL    Sodium 139 135 - 144 mmol/L    Potassium 4.0 3.7 - 5.3 mmol/L    Chloride 105 98 - 107 mmol/L    CO2 27 20 - 31 mmol/L    Anion Gap 7 (L) 9 - 17 mmol/L    Est, Glom Filt Rate >60 >60 mL/min/1.73m2   CBC with Auto Differential   Result Value Ref Range    WBC 7.3 3.5 - 11.3 k/uL    RBC 3.38 (L) 3.95 - 5.11 m/uL    Hemoglobin 11.0 (L) 11.9 - 15.1 g/dL    Hematocrit 30.3 (L) 36.3 - 47.1 %    MCV 89.6 82.6 - 102.9 fL    MCH 32.5 25.2 - 33.5 pg    MCHC 36.3 (H) 28.4 - 34.8 g/dL    RDW 14.7 (H) 11.8 - 14.4 %    Platelets 410 (H) 408 - 453 k/uL    MPV 12.5 8.1 - 13.5 fL    NRBC Automated 0.0 0.0 per 100 WBC    Seg Neutrophils 68 (H) 36 - 65 %    Lymphocytes 15 (L) 24 - 43 %    Monocytes 13 (H) 3 - 12 %    Eosinophils % 3 1 - 4 %    Basophils 0 0 - 2 %    Immature Granulocytes 1 (H) 0 %    Segs Absolute 4.66 1.50 - 8.10 k/uL    Absolute Lymph # 1.05 (L) 1.10 - 3.70 k/uL    Absolute Mono # 0.91 0.10 - 1.20 k/uL    Absolute Eos # 0.23 0.00 - 0.44 k/uL    Basophils Absolute <0.03 0.00 - 0.20 k/uL    Absolute Immature Granulocyte 0.05 0.00 - 0.30 k/uL    RBC Morphology ANISOCYTOSIS PRESENT    Venous Blood Gas, POC   Result Value Ref Range    pH, Vipul 7.402 7.320 - 7.430    pCO2, Vipul 40.1 (L) 41.0 - 51.0 mm Hg    pO2, Vipul 30.4 30.0 - 50.0 mm Hg    HCO3, Venous 24.9 22.0 - 29.0 mmol/L    Positive Base Excess, Vipul 0 0.0 - 3.0    O2 Sat, Vipul 58 (L) 60.0 - 85.0 %   Anion Gap (Calc) POC   Result Value Ref Range    Anion Gap 12 7 - 16 mmol/L   Arterial Blood Gas, POC   Result Value Ref Range    POC pH 7.381 7.350 - 7.450    POC pCO2 42.1 35.0 - 48.0 mm Hg    POC PO2 79.2 (L) 83.0 - 108.0 mm Hg    POC HCO3 25.0 21.0 - 28.0 mmol/L    Positive Base Excess, Art 0 0.0 - 3.0    POC O2 SAT 95 94.0 - 98.0 %    O2 Device/Flow/% Adult Ventilator     Nacho Test POSITIVE     Sample Site Arterial Line     Mode PRVC     FIO2 100.0    POCT Glucose   Result Value Ref Range    POC Glucose 97 74 - 100 mg/dL   Arterial Blood Gas, POC   Result Value Ref Range    POC pH 7.441 7.350 - 7.450    POC pCO2 35.6 35.0 - 48.0 mm Hg    POC PO2 121.2 (H) 83.0 - 108.0 mm Hg    POC HCO3 24.2 21.0 - 28.0 mmol/L    Positive Base Excess, Art 0 0.0 - 3.0    POC O2 SAT 99 (H) 94.0 - 98.0 %    O2 Device/Flow/% Adult Ventilator     Nacho Test POSITIVE     Sample Site Arterial Line     Mode PRVC     FIO2 100.0    POCT Glucose   Result Value Ref Range    POC Glucose 87 74 - 100 mg/dL   Arterial Blood Gas, POC   Result Value Ref Range    POC pH 7.362 7.350 - 7.450    POC pCO2 39.2 35.0 - 48.0 mm Hg    POC PO2 77.1 (L) 83.0 - 108.0 mm Hg    POC HCO3 22.2 21.0 - 28.0 mmol/L    Negative Base Excess, Art 3 (H) 0.0 - 2.0    POC O2 SAT 95 94.0 - 98.0 %    O2 Device/Flow/% Adult Ventilator     Nacho Test POSITIVE     Sample Site Left Radial Artery     Mode PRVC     FIO2 40.0    Lactic Acid, POC   Result Value Ref Range    POC Lactic Acid 0.66 0.56 - 1.39 mmol/L   POCT Glucose   Result Value Ref Range    POC Glucose 79 74 - 100 mg/dL   EKG 12 Lead   Result Value Ref Range    Ventricular Rate 90 BPM    Atrial Rate 90 BPM    P-R Interval 140 ms    QRS Duration 82 ms    Q-T Interval 354 ms    QTc Calculation (Bazett) 433 ms    P Axis 60 degrees    R Axis 52 degrees    T Axis 69 degrees       Radiology/Imaging:  XR CHEST PORTABLE   Final Result   No significant change from prior study.   Airspace opacity in the right mid   and lower lung zones remains with right effusion. Patient has some   loculation at the right base. XR CHEST PORTABLE   Final Result   1. Apparent increase in airspace opacity in the mid to basilar right lung   likely due in part to passive atelectasis given apparent increase in   overlying and possibly loculated trace to small right pleural effusion. However, the appearance may be related to differences in layering of the   effusion. Superimposed pneumonia or aspiration may persist.   2. No significant change in left basilar atelectasis, pneumonia, and/or   aspiration. 3. Pulmonary vascular congestion and mild cardiomegaly. XR CHEST PORTABLE   Final Result   Similar patchy bibasilar infiltrates, greater on the right         XR CHEST PORTABLE   Final Result   Similar bibasilar opacities, greater on the right. XR ABDOMEN FOR NG/OG/NE TUBE PLACEMENT   Final Result   The enteric catheter tip terminates in the region of the proximal to mid   gastric body junction. XR CHEST PORTABLE   Final Result   ET tube measures 5.8 cm above the lila. Right basilar pneumonia, with mild increased lung expansion and improved   aeration at the right lung base. New linear airspace disease at the left lung base likely representing   atelectasis. CT CHEST PULMONARY EMBOLISM W CONTRAST   Final Result   No evidence of pulmonary embolism or dissection. Dense consolidation within the lower lobes bilaterally, as well as in the   right middle lobe, most compatible with severe lobar pneumonia. Aspiration   would also be a consideration. This process must be followed to resolution to ensure that there is no   underlying neoplasm. XR CHEST PORTABLE   Final Result      Focal consolidative changes of right middle lobe and right lower lobe. Findings are likely suggestive of pneumonia.              ASSESSMENT:     Patient Active Problem List Diagnosis Date Noted    Tachypnea 09/30/2022    Acute respiratory failure with hypoxia (HCC) 09/30/2022    Sepsis ([facility] 09/28/2022    Pneumonia due to infectious organism 09/28/2022    Hypokalemia 09/28/2022    Pneumonia 09/28/2022    Chronic midline low back pain with right-sided sciatica 05/24/2022    Chronic neck pain 05/24/2022    Protein malnutrition ([facility] 03/24/2022    Vitamin D deficiency 03/24/2022    Opioid dependence on agonist therapy ([facility] 03/24/2022    Peripheral edema 03/24/2022    H/O bilateral salpingectomy 03/24/2022    Chronic allergic rhinitis 11/22/2021    Pre-eclampsia in third trimester 06/23/2021    Maternal antithrombin III deficiency complicating pregnancy ([facility] 03/08/2021    H/O opioid abuse ([facility] 02/19/2021    Advanced maternal age in multigravida 02/10/2021    Dichorionic diamniotic twin pregnancy, antepartum 02/10/2021    Hx of preeclampsia, prior pregnancy, currently pregnant 02/10/2021    Degeneration of cervical intervertebral disc 12/21/2020    Gastro-esophageal reflux disease without esophagitis 12/21/2020    Generalized anxiety disorder 12/21/2020    Essential hypertension 12/21/2020    Opioid use, unspecified with other opioid-induced disorder (HCC)(Suboxone) 12/21/2020    History of opiate therapy 12/21/2020    Smoker 12/21/2020    Family history of colon cancer 12/21/2020    Family history of breast cancer in mother 12/21/2020    Family history of early CAD 12/21/2020    Lumbar radiculopathy, chronic 02/27/2013    Fatigue 12/05/2012    Depression 12/05/2012        PLAN:       PLAN/MEDICAL DECISION MAKING:  Neurologic:     Neuro intact  Neuro checks per protocol  Alert and following commands    Cardiovascular:    Hemodynamically stable  MAP goal 65    Pulmonary:  Hypoxic respiratory failure secondary to Pneumonia     Maintain oxygen sats >92%  Pulmonary toilet  Levaquin PO  Home oxygen eval , if negative can be discharged home.      GI/Nutrition    sennakot 2 tabs in AM  Ulcer Prophylaxis: PPI (pepcid)  Diet:ADULT DIET; Regular    Renal/Fluid/Electrolyte    Monitor electrolytes, replace PRN   ID  WBC:   Lab Results   Component Value Date    WBC 7.3 10/04/2022     Tmax: Temp (24hrs), Av.9 °F (36.6 °C), Min:97.5 °F (36.4 °C), Max:98.1 °F (36.7 °C)    Rpp negative  Antimicrobials: levaquin PO for one more day     Hematology:  Recent Labs     10/02/22  0531 10/03/22  0424 10/04/22  0555   HGB 10.4* 11.0* 11.0*     Endocrine:   glucose controlled - most recent BGL is   Recent Labs     10/02/22  0531 10/03/22  0424 10/04/22  0555   GLUCOSE 100* 83 96     DVT Prophylaxis  lovenox  Discharge Needs:  PT, OT, ST, SW, and Case Management      Transfer patient out. CODE STATUS: Full Code      DISPOSITION:  [] To remain ICU:  [x] OK for out of ICU from [facility] to discharge today    Liyah Araiza MD  Internal Medicine Resident, PGY- [address], [place]  10/4/2022, 2:51 PM

## 2022-10-04 NOTE — PROGRESS NOTES
Home Oxygen Evaluation    Home Oxygen Evaluation completed. Patient is on 3 liters per minute via nasal cannula. Resting SpO2 = 100%  Resting SpO2 on room air = 97%    SpO2 on room air with exercise = 94%  SpO2 on oxygen as above with exercise = 97%    Nocturnal Oximetry with patient on room air is recommended is SpO2 is between 89% and 95% (requires additional order).     Ed SHERON Tay  9:29 AM

## 2022-10-04 NOTE — FLOWSHEET NOTE
Order obtained for discharge, discharge summary complete. AVS printed and gone over with pt. Pt. Verbalized understanding, no further questions. Pt refused wheelchair and ambulated independently to lobby family. Last set of vitals obtained, Pt A&O x4.    10/04/22 1200   Vitals   Temp 97.5 °F (36.4 °C)   Temp Source Oral   Heart Rate 90   Heart Rate Source Monitor   Resp 16   BP (!) 142/107   MAP (mmHg) 119   BP Location Right upper arm   BP Upper/Lower Upper   BP Method Automatic   Patient Position Semi fowlers   Level of Consciousness 0   MEWS Score 1   Cardiac Rhythm Sinus rhythm   Pain Assessment   Pain Assessment None - Denies Pain   Pain Level 0   Patient's Stated Pain Goal 0 - No pain   Non-Pharmaceutical Pain Intervention(s) Repositioned; Rest   Opioid-Induced Sedation   POSS Score 1   RASS   Fernández Agitation Sedation Scale (RASS) 0   Oxygen Therapy   SpO2 92 %   Pulse Oximetry Type Continuous   Pulse Oximeter Device Mode Continuous   Pulse Oximeter Device Location Other(comment)  (Ear)   O2 Device Nasal cannula   Skin Assessment Clean, dry, & intact   O2 Flow Rate (L/min) 3 L/min

## 2022-10-04 NOTE — PLAN OF CARE
Problem: Respiratory - Adult  Goal: Achieves optimal ventilation and oxygenation  Outcome: Progressing     Problem: Discharge Planning  Goal: Discharge to home or other facility with appropriate resources  Outcome: Progressing  Flowsheets (Taken 10/4/2022 0800)  Discharge to home or other facility with appropriate resources: Identify barriers to discharge with patient and caregiver     Problem: Pain  Goal: Verbalizes/displays adequate comfort level or baseline comfort level  Outcome: Progressing     Problem: Skin/Tissue Integrity  Goal: Absence of new skin breakdown  Description: 1. Monitor for areas of redness and/or skin breakdown  2. Assess vascular access sites hourly  3. Every 4-6 hours minimum:  Change oxygen saturation probe site  4. Every 4-6 hours:  If on nasal continuous positive airway pressure, respiratory therapy assess nares and determine need for appliance change or resting period.   Outcome: Progressing     Problem: Safety - Adult  Goal: Free from fall injury  Outcome: Progressing  Flowsheets (Taken 10/4/2022 0800)  Free From Fall Injury: Instruct family/caregiver on patient safety     Problem: ABCDS Injury Assessment  Goal: Absence of physical injury  Outcome: Progressing  Flowsheets (Taken 10/4/2022 0800)  Absence of Physical Injury: Implement safety measures based on patient assessment     Problem: Nutrition Deficit:  Goal: Optimize nutritional status  Outcome: Progressing

## 2022-10-05 ENCOUNTER — CARE COORDINATION (OUTPATIENT)
Dept: CASE MANAGEMENT | Age: 42
End: 2022-10-05

## 2022-10-05 ENCOUNTER — TELEPHONE (OUTPATIENT)
Dept: FAMILY MEDICINE CLINIC | Age: 42
End: 2022-10-05

## 2022-10-05 DIAGNOSIS — A41.9 SEPSIS, DUE TO UNSPECIFIED ORGANISM, UNSPECIFIED WHETHER ACUTE ORGAN DYSFUNCTION PRESENT (HCC): Primary | ICD-10-CM

## 2022-10-05 RX ORDER — GABAPENTIN 100 MG/1
100 CAPSULE ORAL 3 TIMES DAILY
Qty: 90 CAPSULE | Refills: 0 | Status: SHIPPED | OUTPATIENT
Start: 2022-10-05 | End: 2022-11-04

## 2022-10-05 NOTE — TELEPHONE ENCOUNTER
[address] Transitions Initial Follow Up Call    Outreach made within 2 business days of discharge: Yes    Patient: Nicole Colon Patient : 1980   MRN: [id number]  Reason for Admission: There are no discharge diagnoses documented for the most recent discharge. Discharge Date: 10/4/22       Spoke with: Lizandro Patel    Discharge department/facility: [facility] Interactive Patient Contact:  Was patient able to fill all prescriptions: Yes  Was patient instructed to bring all medications to the follow-up visit: Yes  Is patient taking all medications as directed in the discharge summary?  Yes  Does patient understand their discharge instructions: Yes  Does patient have questions or concerns that need addressed prior to 7-14 day follow up office visit: no    Scheduled appointment with PCP within 7-14 days    Follow Up  Future Appointments   Date Time Provider Shaji Ortega   10/11/2022 12:40 PM TABITHA Kaba - CNP [address]   10/25/2022 10:00 AM Susie Bañuelos APRN - CNP [facility] [address] [place], [place]

## 2022-10-05 NOTE — TELEPHONE ENCOUNTER
Please Approve or Refuse.   Send to Pharmacy per Pt's Request:      Next Visit Date:  10/25/2022   Last Visit Date: 7/25/2022    Hemoglobin A1C (%)   Date Value   03/21/2022 5.1             ( goal A1C is < 7)   BP Readings from Last 3 Encounters:   10/04/22 (!) 142/107   09/01/22 (!) 149/101   07/25/22 138/86          (goal 120/80)  BUN   Date Value Ref Range Status   10/04/2022 5 (L) 6 - 20 mg/dL Final     Creatinine   Date Value Ref Range Status   10/04/2022 0.28 (L) 0.50 - 0.90 mg/dL Final     Potassium   Date Value Ref Range Status   10/04/2022 4.0 3.7 - 5.3 mmol/L Final

## 2022-10-05 NOTE — CARE COORDINATION
[facility] Care Transitions Initial Follow Up Call    Call within 2 business days of discharge: Yes    Care Transition Nurse contacted the patient by telephone to perform post hospital discharge assessment. Verified name and  with patient as identifiers. Provided introduction to self, and explanation of the Care Transition Nurse role. Patient: Karen Lorenzo Patient : 1980   MRN: [id number]  Reason for Admission: Tachypnea/pneumonia/Sepsis  Discharge Date: 10/4/22 RARS: Readmission Risk Score: 10.9      Last Discharge  [address]       Date Complaint Diagnosis Description Type Department Provider    22 Shortness of Breath Tachypnea . .. ED to Hosp-Admission (Discharged) (ADMITTED) [facility] 3 Zaida Ni MD; Ester Becker. .. Was this an external facility discharge? No Discharge Facility: [facility]    Challenges to be reviewed by the provider   Additional needs identified to be addressed with provider: Yes  7 day hospital follow up                Method of communication with provider: staff message. Was able to contact Alejandra for initial transitional outreach. She stated that she was doing \"ok\". She said that her breathing was better and was able to navigate the home without shortness of breath; cough is better, no congestion, no fever/chills, and is eating and drinking with difficulty. Reviewed medications and she stated that she had all her medications. She contacted physician for refill on her Neurontin. She has a VV with provider and has made follow up with pulmonology in November. She had no quesitons or concerns at this time. Care Transition Nurse reviewed discharge instructions and medical action plan with patient who verbalized understanding. The patient was given an opportunity to ask questions and does not have any further questions or concerns at this time. Were discharge instructions available to patient? Yes.  Reviewed appropriate site of care based on symptoms and resources available to patient including: PCP  Specialist  When to call 911. The patient agrees to contact the PCP office for questions related to their healthcare. Advance Care Planning:   Does patient have an Advance Directive:  no ACP doc, declined referral. Decision makers verified . Medication reconciliation was performed with patient, who verbalizes understanding of administration of home medications. Medications reviewed, 1111F entered: yes    Was patient discharged with a pulse oximeter? no    Non-face-to-face services provided:  Obtained and reviewed discharge summary and/or continuity of care documents  Assessment and support for treatment adherence and medication management-reviewed AVS    Offered patient enrollment in the Remote Patient Monitoring (RPM) program for in-home monitoring: Patient is not eligible for RPM program.    Care Transitions 24 Hour Call    Care Transitions Interventions         Follow Up  Future Appointments   Date Time Provider Shaji Ortega   10/11/2022 12:40 PM TABITHA Castanon - [facility]AT[facility] [address]   10/25/2022 10:00 AM TABITHA Hopson - CNP [address] Transition Nurse provided contact information. Plan for follow-up call in 5-7 days based on severity of symptoms and risk factors.   Plan for next call: symptom management-Routine follow up on respiratory status post pneumonia    Crystal Hutchison RN

## 2022-10-06 NOTE — DISCHARGE SUMMARY
[address]     Department of Internal Medicine - Staff Internal Medicine Teaching Service    INPATIENT DISCHARGE SUMMARY      Patient Identification:  Marguerite Boogie is a 39 y.o. female. :  1980  MRN: [id number]     Acct: [de-identified]   PCP: TABITHA Chaves - CNP  Admit Date:  2022  Discharge date and time: 10/4/2022  2:01 PM   Attending Provider: No att. providers found                                     [address] Problem Lists:    Principal Problem:    Sepsis ([address]  Active Problems:    Pneumonia due to infectious organism    Hypokalemia    Pneumonia    Tachypnea    Acute respiratory failure with hypoxia ([address]    Essential hypertension  Resolved Problems:    * No resolved hospital problems. *      HOSPITAL STAY     Brief Inpatient course:   Marguerite Boogie is a 39 y.o. female who was admitted for the management of Sepsis [facility]), presented to the emergency department with shortness of breath with respiratory distress and was found to be having lobar pneumonia in right middle lobe. She was intubated for airway support and was admitted to ICU . She was treated with Rocephin and azithromycin, her course has improved and was extubated to nasal canula on  subsequently and was discharged with Levaquin, she had home with oxygen eval done which she did not qualify     Pt was advised to follow up with PCP after discharge. Procedures/ Significant Interventions:      Intubation.      Consults:     Consults:     Final Specialist Recommendations/Findings:   IP CONSULT TO CRITICAL CARE      Any Hospital Acquired Infections: none    Discharge Functional Status:  stable    DISCHARGE PLAN     Disposition: home    Patient Instructions:   Discharge Medication List as of 10/4/2022  1:31 PM        CONTINUE these medications which have CHANGED    Details   benzocaine-menthol (CEPACOL SORE THROAT) 15-3.6 MG lozenge Take 1 lozenge by mouth 2 times daily as needed for Sore Throat, Disp-14 lozenge, R-0Normal      levoFLOXacin (LEVAQUIN) 750 MG tablet Take 1 tablet by mouth daily for 1 dose, Disp-1 tablet, R-0Normal           CONTINUE these medications which have NOT CHANGED    Details   tiZANidine (ZANAFLEX) 2 MG tablet TAKE 1 TO 2 TABLETS BY MOUTH AT NIGHT, Disp-60 tablet, R-2RXRD. Refill Due on 08/17/22. Queued by [facility] . Normal      gabapentin (NEURONTIN) 100 MG capsule TAKE 1 CAPSULE BY MOUTH EVERY MORNING, 1 CAPSULE AT NOON AND 1 CAPSULE BEFORE BEDTIME, Disp-90 capsule, R-0Normal      lisinopril (PRINIVIL;ZESTRIL) 5 MG tablet Take 1 tablet by mouth in the morning., Disp-90 tablet, R-2Normal      clotrimazole-betamethasone (LOTRISONE) 1-0.05 % cream APPLY TOPICALLY TO AFFECTED AREAS every morning and BEFORE BEDTIME, Historical Med      fluticasone (FLONASE) 50 MCG/ACT nasal spray INSTILL 1 SPRAY BY EACH NOSTRIL ROUTE DAILY, Disp-16 g, R-2RXRD. Refill Due on 07/20/22. Queued by [employer] . Normal      naproxen (NAPROSYN) 500 MG tablet Take 1 tablet by mouth 2 times daily (with meals), Disp-180 tablet, R-1Normal      naloxone 4 MG/0.1ML LIQD nasal spray 1 spray by Nasal route as neededHistorical Med      Prenatal Vit-Fe Fumarate-FA (PRENATAL VITAMINS) 28-0.8 MG TABS Take 1 tablet by mouth daily, Disp-90 tablet, R-2Normal      vitamin D (CHOLECALCIFEROL) 25 MCG (1000 UT) TABS tablet Take 1 tablet by mouth daily, Disp-90 tablet, R-5Normal      Nutritional Supplements (NUTRITIONAL SHAKE HIGH PROTEIN) LIQD Take 1 Units by mouth daily, Disp-30 each, R-2Normal      aspirin 81 MG EC tablet Take 1 tablet by mouth dailyHistorical Med      buprenorphine-naloxone (SUBOXONE) 8-2 MG FILM SL film Historical Med             Activity: activity as tolerated    Diet: regular diet    Follow-up:    Billy Lopez, APRN - AN[facility]  [address]  [address]  [address]  [phone]    Schedule an appointment as soon as possible for a visit in 1 week(s)      TABITHA Hopson - [address] [facility]  [address] [facility]          Patient Instructions: Follow up with PCP after discharge and continue oral antibiotics as prescribed before discharge. Follow up labs: none  Follow up imaging: none    Note that over 30 minutes was spent in preparing discharge papers, discussing discharge with patient, medication review, etc.      Leni Santos MD, MD  Internal Medicine Resident, PGY-3  [facility];  [facility], [place]  10/6/2022, 10:04 AM

## 2022-10-11 ENCOUNTER — CARE COORDINATION (OUTPATIENT)
Dept: CASE MANAGEMENT | Age: 42
End: 2022-10-11

## 2022-10-11 NOTE — CARE COORDINATION
[facility] Care Transitions Follow Up Call    Patient: Trudy Atkins  Patient : 1980   MRN: [id number]  Reason for Admission: Tachypnea/pneumonia/Sepsis  Discharge Date: 10/4/22 RARS: Readmission Risk Score: 10.9      Needs to be reviewed by the provider   Additional needs identified to be addressed with provider:                Method of communication with provider: . Attempt to reach patient for Care Transitions. [facility] requesting return call. Contact information provided.   [phone]      Follow Up  Future Appointments   Date Time Provider Shaji Ortega   10/13/2022  8:00 AM Inocente Ordonez MD [facility]   10/27/2022  2:00 PM TABITHA Anna - [facility] [address]   2022  9:30 AM Ciro Flood MD Resp Spec [place] Transitions Subsequent and Final Call    Subsequent and Final Calls  Care Transitions Interventions  Other Interventions:             Bird Kahn RN

## 2022-10-12 ENCOUNTER — CARE COORDINATION (OUTPATIENT)
Dept: CASE MANAGEMENT | Age: 42
End: 2022-10-12

## 2022-10-12 ASSESSMENT — PATIENT HEALTH QUESTIONNAIRE - PHQ9
5. POOR APPETITE OR OVEREATING: 0
2. FEELING DOWN, DEPRESSED OR HOPELESS: 0
6. FEELING BAD ABOUT YOURSELF - OR THAT YOU ARE A FAILURE OR HAVE LET YOURSELF OR YOUR FAMILY DOWN: 0
SUM OF ALL RESPONSES TO PHQ QUESTIONS 1-9: 0
SUM OF ALL RESPONSES TO PHQ QUESTIONS 1-9: 0
10. IF YOU CHECKED OFF ANY PROBLEMS, HOW DIFFICULT HAVE THESE PROBLEMS MADE IT FOR YOU TO DO YOUR WORK, TAKE CARE OF THINGS AT HOME, OR GET ALONG WITH OTHER PEOPLE: 0
3. TROUBLE FALLING OR STAYING ASLEEP: 0
SUM OF ALL RESPONSES TO PHQ9 QUESTIONS 1 & 2: 0
4. FEELING TIRED OR HAVING LITTLE ENERGY: 0
SUM OF ALL RESPONSES TO PHQ QUESTIONS 1-9: 0
8. MOVING OR SPEAKING SO SLOWLY THAT OTHER PEOPLE COULD HAVE NOTICED. OR THE OPPOSITE, BEING SO FIGETY OR RESTLESS THAT YOU HAVE BEEN MOVING AROUND A LOT MORE THAN USUAL: 0
9. THOUGHTS THAT YOU WOULD BE BETTER OFF DEAD, OR OF HURTING YOURSELF: 0
SUM OF ALL RESPONSES TO PHQ QUESTIONS 1-9: 0
1. LITTLE INTEREST OR PLEASURE IN DOING THINGS: 0
7. TROUBLE CONCENTRATING ON THINGS, SUCH AS READING THE NEWSPAPER OR WATCHING TELEVISION: 0

## 2022-10-12 NOTE — CARE COORDINATION
[facility] Care Transitions Follow Up Call    Care Transition Nurse contacted the patient by telephone to follow up after admission on 22. Verified name and  with patient as identifiers. Patient: Chris Stovall  Patient : 1980   MRN: [id number]  Reason for Admission: Tachypnea, PNE, Sepsis  Discharge Date: 10/4/22 RARS: Readmission Risk Score: 10.9      Needs to be reviewed by the provider   Additional needs identified to be addressed with provider: No  none             Method of communication with provider: none. Evelia Pereira states that she no longer is experiencing SOB, states a mild, occasional cough-no sputum. Denies fever, is eating and drinking OK. Reports weakness. Is getting on a call with her PCP tomorrow. Addressed changes since last contact:   doing better  Discussed follow-up appointments. If no appointment was previously scheduled, appointment scheduling offered: addressed on a previous call. .   Is follow up appointment scheduled within 7 days of discharge? No.    Follow Up  Future Appointments   Date Time Provider Shaji Ortega   10/13/2022  8:00 AM Ubaldo Velasquez MD  sc [facility]   10/27/2022  2:00 PM TABITHA Basilio - CNP [facility] [address]   2022  9:30 AM [facility] Transition Nurse reviewed red flags with patient and discussed any barriers to care and/or understanding of plan of care after discharge. Discussed appropriate site of care based on symptoms and resources available to patient including: PCP. The patient agrees to contact the PCP office for questions related to their healthcare. Advance Care Planning:   reviewed and current.      Patients top risk factors for readmission: medical condition-PNE, SEPSIS  Interventions to address risk factors:  nurse assessment    Offered patient enrollment in the Remote Patient Monitoring (RPM) program for in-home monitoring: Patient is not eligible for RPM program.     Care Transitions Subsequent and Final Call    Subsequent and Final Calls  Care Transitions Interventions  Other Interventions:           Plan for follow-up call in 5-7 days based on severity of symptoms and risk factors.   Plan for next call: symptom management-assess  self management-assess    Evan Elaine RN

## 2022-10-12 NOTE — PROGRESS NOTES
Visit Information    Have you changed or started any medications since your last visit including any over-the-counter medicines, vitamins, or herbal medicines? no   Have you stopped taking any of your medications? Is so, why? -  no  Are you having any side effects from any of your medications? - no    Have you seen any other physician or provider since your last visit? yes -    Have you had any other diagnostic tests since your last visit? yes -    Have you been seen in the emergency room and/or had an admission in a hospital since we last saw you?  yes -    Have you had your routine dental cleaning in the past 6 months?  no     Do you have an active MyChart account? If no, what is the barrier?   Yes    Patient Care Team:  TABITHA Jeffrey CNP as PCP - General (Family Medicine)  TABITHA Santizo CNP as PCP - [facility] Empaneled Provider  Armando Martinez MD (Obstetrics & Gynecology)  Efrain Naranjo RN as Care Transitions Nurse    Medical History Review  Past Medical, Family, and Social History reviewed and does contribute to the patient presenting condition    Health Maintenance   Topic Date Due    DTaP/Tdap/Td vaccine (1 - Tdap) Never done    Cervical cancer screen  Never done    Flu vaccine (1) 08/01/2022    Breast cancer screen  05/09/2023    Depression Monitoring  05/24/2023    Lipids  03/21/2027    COVID-19 Vaccine  Completed    Hepatitis C screen  Completed    HIV screen  Completed    Hepatitis A vaccine  Aged Out    Hib vaccine  Aged Out    Meningococcal (ACWY) vaccine  Aged Out    Pneumococcal 0-64 years Vaccine  Aged Out    Varicella vaccine  Discontinued

## 2022-10-13 ENCOUNTER — TELEPHONE (OUTPATIENT)
Dept: FAMILY MEDICINE CLINIC | Age: 42
End: 2022-10-13

## 2022-10-13 ENCOUNTER — TELEMEDICINE (OUTPATIENT)
Dept: FAMILY MEDICINE CLINIC | Age: 42
End: 2022-10-13
Payer: COMMERCIAL

## 2022-10-13 DIAGNOSIS — G89.29 CHRONIC MIDLINE LOW BACK PAIN WITH RIGHT-SIDED SCIATICA: ICD-10-CM

## 2022-10-13 DIAGNOSIS — Z09 HOSPITAL DISCHARGE FOLLOW-UP: ICD-10-CM

## 2022-10-13 DIAGNOSIS — E55.9 VITAMIN D DEFICIENCY: ICD-10-CM

## 2022-10-13 DIAGNOSIS — J18.9 MULTIFOCAL PNEUMONIA: Primary | ICD-10-CM

## 2022-10-13 DIAGNOSIS — I10 ESSENTIAL HYPERTENSION: ICD-10-CM

## 2022-10-13 DIAGNOSIS — M54.41 CHRONIC MIDLINE LOW BACK PAIN WITH RIGHT-SIDED SCIATICA: ICD-10-CM

## 2022-10-13 DIAGNOSIS — D64.9 ANEMIA, UNSPECIFIED TYPE: ICD-10-CM

## 2022-10-13 DIAGNOSIS — Z23 ENCOUNTER FOR IMMUNIZATION: ICD-10-CM

## 2022-10-13 PROCEDURE — 1111F DSCHRG MED/CURRENT MED MERGE: CPT | Performed by: FAMILY MEDICINE

## 2022-10-13 PROCEDURE — 99214 OFFICE O/P EST MOD 30 MIN: CPT | Performed by: FAMILY MEDICINE

## 2022-10-13 RX ORDER — BNT162B2 0.23 MG/2.25ML
0.3 INJECTION, SUSPENSION INTRAMUSCULAR ONCE
Qty: 0.3 ML | Refills: 0 | Status: SHIPPED | OUTPATIENT
Start: 2022-10-13 | End: 2022-10-13

## 2022-10-13 RX ORDER — ALBUTEROL SULFATE 90 UG/1
2 AEROSOL, METERED RESPIRATORY (INHALATION) EVERY 6 HOURS PRN
Qty: 8 G | Refills: 0 | Status: SHIPPED | OUTPATIENT
Start: 2022-10-13

## 2022-10-13 RX ORDER — NAPROXEN 500 MG/1
500 TABLET ORAL 2 TIMES DAILY PRN
Qty: 180 TABLET | Refills: 1
Start: 2022-10-13 | End: 2022-11-03

## 2022-10-13 RX ORDER — LISINOPRIL 10 MG/1
10 TABLET ORAL DAILY
Qty: 90 TABLET | Refills: 3 | Status: SHIPPED | OUTPATIENT
Start: 2022-10-13

## 2022-10-13 ASSESSMENT — ENCOUNTER SYMPTOMS
DIARRHEA: 0
CHEST TIGHTNESS: 0
ABDOMINAL PAIN: 0
CONSTIPATION: 0
BACK PAIN: 1
WHEEZING: 0
NAUSEA: 0
VOMITING: 0
COUGH: 0
ABDOMINAL DISTENTION: 0
SHORTNESS OF BREATH: 1

## 2022-10-13 NOTE — PROGRESS NOTES
TELEHEALTH EVALUATION -- Audio/Visual (During VKPEE-59 public health emergency) using [address] Follow Up      Rimma Banda   YOB: 1980    Date of Office Visit:  10/13/2022  Date of Hospital Admission: 9/28/22  Date of Hospital Discharge: 10/4/22  Readmission Risk Score(high >=14%. Medium >=10%):Readmission Risk Score: 10.9      Care management risk score Rising risk (score 2-5) and Complex Care (Scores >=6): No Risk Score On File     Non face to face  following discharge, date last encounter closed (first attempt may have been earlier): 10/05/2022     Call initiated 2 business days of discharge: Yes     Below is the assessment and plan developed based on review of pertinent history, physical exam, labs, studies, and medications. Multifocal pneumonia  Improving  To start albuterol inhaler 2 puffs twice a day, prescription given  Chest x-ray and pulmonary function tests in 1 month  Follow-up with pulmonologist as scheduled  -     DE DISCHARGE MEDS RECONCILED W/ CURRENT OUTPATIENT MED LIST  -     XR CHEST (2 VW); Future  -     Full PFT Study With Bronchodilator; Future  Essential hypertension  uncontrolled    -     DE DISCHARGE MEDS RECONCILED W/ CURRENT OUTPATIENT MED LIST  -    Dose increased lisinopril (PRINIVIL;ZESTRIL) 10 MG tablet; Take 1 tablet by mouth daily Dose increased 10/13/2022. Goal BP bellow 135/85 and above 115/65, heart rate 56 to 90 bpm, Disp-90 tablet, R-3Normal  -     Comprehensive Metabolic Panel; Future  -     Uric Acid; Future  Recheck labs  Discussed low salt diet and BP and pulse monitoring. Needs nurse visit appointment BP check in 1 week.     Chronic midline low back pain with right-sided sciatica  Improved with medications  To continue gabapentin 100 mg 3 times a day, she is also on Suboxone, tizanidine as needed, stretching, repositioning, foam top mattress discussed  Cut down on naproxen  Follow-up with pain management as scheduled  -     SC DISCHARGE MEDS RECONCILED W/ CURRENT OUTPATIENT MED LIST  -     naproxen (NAPROSYN) 500 MG tablet; Take 1 tablet by mouth 2 times daily as needed for Pain Take with food, can cause ulcers and kidney failure, Disp-180 tablet, R-1NO PRINT  Anemia, unspecified type  New  Continue prenatal vitamins until gone recheck labs, follow-up with GYN for Pap smear  Anemia work-up ordered  -     SC DISCHARGE MEDS RECONCILED W/ CURRENT OUTPATIENT MED LIST  -     CBC with Auto Differential; Future  -     Vitamin B12 & Folate; Future  -     Iron and TIBC; Future  -     Ferritin; Future  -     Magnesium; Future  Vitamin D deficiency  Unsure if improving or not. Will recheck level  Continue supplementation.    -     SC DISCHARGE MEDS RECONCILED W/ CURRENT OUTPATIENT MED LIST  -     Vitamin D 25 Hydroxy; Future  Hospital discharge follow-up  -     SC DISCHARGE MEDS RECONCILED W/ CURRENT OUTPATIENT MED LIST    Orders Placed This Encounter   Medications    COVID-19 mRNA Vac-Flor,Pfizer, (PFIZER-BIONT COVID-19 VAC-FLOR) 30 MCG/0.3ML SUSP injection     Sig: Inject 0.3 mLs into the muscle once for 1 dose DUE FOR BOOSTER, PLEASE LET PT KNOW WHEN READY     Dispense:  0.3 mL     Refill:  0    lisinopril (PRINIVIL;ZESTRIL) 10 MG tablet     Sig: Take 1 tablet by mouth daily Dose increased 10/13/2022.  Goal BP bellow 135/85 and above 115/65, heart rate 56 to 90 bpm     Dispense:  90 tablet     Refill:  3    naproxen (NAPROSYN) 500 MG tablet     Sig: Take 1 tablet by mouth 2 times daily as needed for Pain Take with food, can cause ulcers and kidney failure     Dispense:  180 tablet     Refill:  1    albuterol sulfate HFA (PROVENTIL HFA) 108 (90 Base) MCG/ACT inhaler     Sig: Inhale 2 puffs into the lungs every 6 hours as needed for Wheezing or Shortness of Breath Okay to substitute     Dispense:  8 g     Refill:  0     Orders Placed This Encounter   Procedures    XR CHEST (2 VW)     Standing Status:   Future     Standing Expiration Date:   10/13/2023     Order Specific Question:   Reason for exam:     Answer:   Cough    CBC with Auto Differential     Standing Status:   Future     Standing Expiration Date:   10/13/2023    Comprehensive Metabolic Panel     Standing Status:   Future     Standing Expiration Date:   12/10/2022    Uric Acid     Standing Status:   Future     Standing Expiration Date:   10/13/2023    Vitamin B12 & Folate     Standing Status:   Future     Standing Expiration Date:   12/10/2022    Vitamin D 25 Hydroxy     Standing Status:   Future     Standing Expiration Date:   10/13/2023    Iron and TIBC     Standing Status:   Future     Standing Expiration Date:   12/10/2022     Order Specific Question:   Is Patient Fasting? Answer:   yes     Order Specific Question:   No of Hours? Answer:   8    Ferritin     Standing Status:   Future     Standing Expiration Date:   10/13/2023    Magnesium     Standing Status:   Future     Standing Expiration Date:   10/13/2023    Full PFT Study With Bronchodilator     Standing Status:   Future     Standing Expiration Date:   10/13/2023     Scheduling Instructions:      Pre- and post bronchodilator    TX DISCHARGE MEDS RECONCILED W/ CURRENT OUTPATIENT MED LIST     Medications Discontinued During This Encounter   Medication Reason    clotrimazole-betamethasone (LOTRISONE) 1-0.05 % cream Therapy completed    Nutritional Supplements (NUTRITIONAL SHAKE HIGH PROTEIN) LIQD Therapy completed    aspirin 81 MG EC tablet Stop Taking at Discharge    naproxen (NAPROSYN) 500 MG tablet REORDER    lisinopril (PRINIVIL;ZESTRIL) 5 MG tablet REORDER       Encounter for immunization  -     TX DISCHARGE MEDS RECONCILED W/ CURRENT OUTPATIENT MED LIST  -     COVID-19 mRNA Vac-Flor,Pfizer, (PFIZER-BIONT COVID-19 VAC-FLOR) 30 MCG/0.3ML SUSP injection;  Inject 0.3 mLs into the muscle once for 1 dose DUE FOR BOOSTER, PLEASE LET PT KNOW WHEN READY, Disp-0.3 mL, R-0Normal    Medical Decision Making: moderate complexity  Return in 3 months (on 1/13/2023) for HTN, LABS F/U. On this date 10/13/2022 I have spent 39 minutes reviewing previous notes, test results and face to face with the patient discussing the diagnosis and importance of compliance with the treatment plan as well as documenting on the day of the visit. Will get flu shot and prevnar in 4 weeks and COVID-19 vaccine now    Controlled Substance Monitoring:    Acute and Chronic Pain Monitoring:   RX Monitoring 10/13/2022   Attestation -   Periodic Controlled Substance Monitoring Possible medication side effects, risk of tolerance/dependence & alternative treatments discussed. ;No signs of potential drug abuse or diversion identified. ;Assessed functional status. Future Appointments   Date Time Provider Shaji Ortega   10/19/2022  1:00 PM [facility]   10/27/2022  2:00 PM TABITHA Thomas CNPC[facility] [address]   11/16/2022  9:30 AM Srinath Fofana MD Resp Spec [facility]   11/17/2022 10:30 AM TABITHA Hopson CNP sc [facility]         Subjective:   HPI    Inpatient course: Discharge summary reviewed- see chart. Interval history/Current status: Improved    Patient was admitted 9/28/2022 through 10/4/2022 at [facility], due to sepsis, pneumonia multifocal, acute respiratory failure with hypoxia, she presented apparently with shortness of breath with respiratory distress, she was intubated for airway support and was admitted to ICU, received treatment with Rocephin and azithromycin, patient was extubated on 9/30/2022, and she was discharged on Levaquin. She was on oxygen while in the hospital, she was not discharged on oxygen at home, she did not need it anymore she says.     Last chest x-ray did show airspace opacities in the right mid lobe and lower lung zones and right pleural effusion and loculation at the right base  She has made appointment with pulmonologist  Blood cultures were negative  BNP was negative  COVID-19 was negative  Sputum culture did show Streptococcus pneumonia      9/29/2022  Impression   No significant change from prior study. Airspace opacity in the right mid   and lower lung zones remains with right effusion. Patient has some   loculation at the right base. Component 9/29/22 0907    Specimen Description . ENDOTRACHEAL    Direct Exam NO EPITHELIAL CELLS SEEN    Direct Exam >25 NEUTROPHILS/LPF    Direct Exam FEW GRAM POSITIVE COCCI IN PAIRS Abnormal     Culture STREPTOCOCCUS PNEUMONIAE LIGHT GROWTH Abnormal     Culture NO NORMAL COURTNEY        Was discharged without oxygen  Patient says currently she is  feeling weak, tired, dyspnea on exertion when climbing up the stairs   Denies cough, chest pain or sputum production     Doesn't smoke, doesn't do THC or vaping  Finished Levaquin   She has questions about immunizations. Patient says her mom was sick at the same time, and admitted at the same time. She is asking if that was contagious for her. Had COVID 19 infection before, in May, and since then she did have some respiratory issues, like dyspnea with exertion. Hypertension:    she  is not exercising and is adherent to low salt diet. Blood pressure is not well controlled at home. Cardiac symptoms dyspnea and fatigue. Patient denies chest pain, chest pressure/discomfort, claudication, exertional chest pressure/discomfort, irregular heart beat, lower extremity edema, near-syncope, orthopnea, palpitations, paroxysmal nocturnal dyspnea, syncope, and tachypnea. Cardiovascular risk factors: hypertension. Use of agents associated with hypertension: NSAIDS. History of target organ damage: none. blood pressure is Elevated.  140/90    BP Readings from Last 3 Encounters:   10/04/22 (!) 142/107   09/01/22 (!) 149/101   07/25/22 138/86        Pulse is Normal.    Pulse Readings from Last 3 Encounters:   10/04/22 90 09/01/22 71   07/25/22 75       Weight is stable  Wt Readings from Last 3 Encounters:   10/03/22 157 lb 10.1 oz (71.5 kg)   09/01/22 157 lb (71.2 kg)   07/25/22 157 lb (71.2 kg)         Patient reports having chronic back pain, which is is worse, midline, shooting down the right lower extremity to the foot    Will see pain management, has appointment  Has been taking gabapentin , tizanidine , aleve  Intensity of pain is 8/10, worse with activities. Has Narcan, she is on Suboxone. Patient says she knows how to use it. Denies side effects from Suboxone. Alejandra has Vitamin D deficiency. Alejandra  is  taking Vitamin D supplementation   she feels tired. Lab Results   Component Value Date    VITD25 15.2 (L) 03/21/2022     Anemia. She does report of having menstrual periods which are heavy, she follows with GYN  She does take baby aspirin and NSAIDs, she denies nausea, vomiting, abdominal pain, black stools or bloody stools.   Lab Results   Component Value Date    WBC 7.3 10/04/2022    HGB 11.0 (L) 10/04/2022    HCT 30.3 (L) 10/04/2022    MCV 89.6 10/04/2022     (H) 10/04/2022         Patient Active Problem List   Diagnosis    Fatigue    Depression    Lumbar radiculopathy, chronic    Degeneration of cervical intervertebral disc    Gastro-esophageal reflux disease without esophagitis    Generalized anxiety disorder    Essential hypertension    History of opiate therapy    Smoker    Family history of colon cancer    Family history of breast cancer in mother    Family history of early CAD    H/O opioid abuse ([address]    Pre-eclampsia in third trimester    Chronic allergic rhinitis    Protein malnutrition ([address]    Vitamin D deficiency    Opioid dependence on agonist therapy ([address]    H/O bilateral salpingectomy    Chronic midline low back pain with right-sided sciatica    Chronic neck pain    Hypokalemia    Multifocal pneumonia    Tachypnea    Anemia       Medications listed as started at the time of discharge from hospital  Cannot display discharge medications since this is not an admission. Patient Instructions:   Discharge Medication List as of 10/4/2022  1:31 PM              CONTINUE these medications which have CHANGED     Details   benzocaine-menthol (CEPACOL SORE THROAT) 15-3.6 MG lozenge Take 1 lozenge by mouth 2 times daily as needed for Sore Throat, Disp-14 lozenge, R-0Normal       levoFLOXacin (LEVAQUIN) 750 MG tablet Take 1 tablet by mouth daily for 1 dose, Disp-1 tablet, R-0Normal                  CONTINUE these medications which have NOT CHANGED     Details   tiZANidine (ZANAFLEX) 2 MG tablet TAKE 1 TO 2 TABLETS BY MOUTH AT NIGHT, Disp-60 tablet, R-2RXRD. Refill Due on 08/17/22. Queued by Smita Rice . Normal       gabapentin (NEURONTIN) 100 MG capsule TAKE 1 CAPSULE BY MOUTH EVERY MORNING, 1 CAPSULE AT NOON AND 1 CAPSULE BEFORE BEDTIME, Disp-90 capsule, R-0Normal       lisinopril (PRINIVIL;ZESTRIL) 5 MG tablet Take 1 tablet by mouth in the morning., Disp-90 tablet, R-2Normal       clotrimazole-betamethasone (LOTRISONE) 1-0.05 % cream APPLY TOPICALLY TO AFFECTED AREAS every morning and BEFORE BEDTIME, Historical Med       fluticasone (FLONASE) 50 MCG/ACT nasal spray INSTILL 1 SPRAY BY EACH NOSTRIL ROUTE DAILY, Disp-16 g, R-2RXRD. Refill Due on 07/20/22. Queued by [employer] . Normal       naproxen (NAPROSYN) 500 MG tablet Take 1 tablet by mouth 2 times daily (with meals), Disp-180 tablet, R-1Normal       naloxone 4 MG/0.1ML LIQD nasal spray 1 spray by Nasal route as neededHistorical Med       Prenatal Vit-Fe Fumarate-FA (PRENATAL VITAMINS) 28-0.8 MG TABS Take 1 tablet by mouth daily, Disp-90 tablet, R-2Normal       vitamin D (CHOLECALCIFEROL) 25 MCG (1000 UT) TABS tablet Take 1 tablet by mouth daily, Disp-90 tablet, R-5Normal       Nutritional Supplements (NUTRITIONAL SHAKE HIGH PROTEIN) LIQD Take 1 Units by mouth daily, Disp-30 each, R-2Normal       aspirin 81 MG EC tablet Take 1 tablet by mouth dailyHistorical Med buprenorphine-naloxone (SUBOXONE) 8-2 MG FILM SL film Historical Med                  Medications marked \"taking\" at this time  Outpatient Medications Marked as Taking for the 10/13/22 encounter (Telemedicine) with Ubaldo Velasquez MD   Medication Sig Dispense Refill    gabapentin (NEURONTIN) 100 MG capsule Take 1 capsule by mouth 3 times daily for 30 days. TAKE 1 CAPSULE BY MOUTH EVERY MORNING, 1 CAPSULE AT NOON AND 1 CAPSULE BEFORE BEDTIME 90 capsule 0    tiZANidine (ZANAFLEX) 2 MG tablet TAKE 1 TO 2 TABLETS BY MOUTH AT NIGHT 60 tablet 2    lisinopril (PRINIVIL;ZESTRIL) 5 MG tablet Take 1 tablet by mouth in the morning. 90 tablet 2    clotrimazole-betamethasone (LOTRISONE) 1-0.05 % cream APPLY TOPICALLY TO AFFECTED AREAS every morning and BEFORE BEDTIME      fluticasone (FLONASE) 50 MCG/ACT nasal spray INSTILL 1 SPRAY BY EACH NOSTRIL ROUTE DAILY 16 g 2    naproxen (NAPROSYN) 500 MG tablet Take 1 tablet by mouth 2 times daily (with meals) 180 tablet 1    naloxone 4 MG/0.1ML LIQD nasal spray 1 spray by Nasal route as needed      Prenatal Vit-Fe Fumarate-FA (PRENATAL VITAMINS) 28-0.8 MG TABS Take 1 tablet by mouth daily 90 tablet 2    Nutritional Supplements (NUTRITIONAL SHAKE HIGH PROTEIN) LIQD Take 1 Units by mouth daily 30 each 2    aspirin 81 MG EC tablet Take 1 tablet by mouth daily      buprenorphine-naloxone (SUBOXONE) 8-2 MG FILM SL film           Medications patient taking as of now reconciled against medications ordered at time of hospital discharge: Yes    Review of Systems   Constitutional:  Positive for fatigue and unexpected weight change. Negative for activity change, appetite change, chills, diaphoresis and fever. Respiratory:  Positive for shortness of breath (BAINS). Negative for cough, chest tightness and wheezing. Cardiovascular:  Negative for chest pain, palpitations and leg swelling.    Gastrointestinal:  Negative for abdominal distention, abdominal pain, blood in stool, constipation, National Emergencies Act, [facility] waiver authority and the [employer] General Act. Patient identification was verified, and a caregiver was present when appropriate. The patient was located at Home: [address] [place] [address]. Provider was located at [facility] (Appt Dept): [place] [address]  [address] [address].         Electronically signed by Leanna Sands MD on 10/16/22 at 8:36 PM EDT

## 2022-10-13 NOTE — TELEPHONE ENCOUNTER
Patients phone was turned off when I attempted to call to schedule follow up for : HTN, LABS F/U, also needs 1 week BP check

## 2022-10-16 PROBLEM — A41.9 SEPSIS (HCC): Status: RESOLVED | Noted: 2022-09-28 | Resolved: 2022-10-16

## 2022-10-16 PROBLEM — R60.0 PERIPHERAL EDEMA: Status: RESOLVED | Noted: 2022-03-24 | Resolved: 2022-10-16

## 2022-10-16 PROBLEM — O99.119 MATERNAL ANTITHROMBIN III DEFICIENCY COMPLICATING PREGNANCY (HCC): Status: RESOLVED | Noted: 2021-03-08 | Resolved: 2022-10-16

## 2022-10-16 PROBLEM — D68.59 MATERNAL ANTITHROMBIN III DEFICIENCY COMPLICATING PREGNANCY (HCC): Status: RESOLVED | Noted: 2021-03-08 | Resolved: 2022-10-16

## 2022-10-16 PROBLEM — O09.529 ADVANCED MATERNAL AGE IN MULTIGRAVIDA: Status: RESOLVED | Noted: 2021-02-10 | Resolved: 2022-10-16

## 2022-10-16 PROBLEM — F11.988 OPIOID USE, UNSPECIFIED WITH OTHER OPIOID-INDUCED DISORDER (HCC): Status: RESOLVED | Noted: 2020-12-21 | Resolved: 2022-10-16

## 2022-10-16 PROBLEM — D64.9 ANEMIA: Status: ACTIVE | Noted: 2022-10-16

## 2022-10-16 PROBLEM — J18.9 PNEUMONIA DUE TO INFECTIOUS ORGANISM: Status: RESOLVED | Noted: 2022-09-28 | Resolved: 2022-10-16

## 2022-10-16 PROBLEM — J96.01 ACUTE RESPIRATORY FAILURE WITH HYPOXIA (HCC): Status: RESOLVED | Noted: 2022-09-30 | Resolved: 2022-10-16

## 2022-10-16 PROBLEM — O09.299 HX OF PREECLAMPSIA, PRIOR PREGNANCY, CURRENTLY PREGNANT: Status: RESOLVED | Noted: 2021-02-10 | Resolved: 2022-10-16

## 2022-10-16 PROBLEM — O30.049 DICHORIONIC DIAMNIOTIC TWIN PREGNANCY, ANTEPARTUM: Status: RESOLVED | Noted: 2021-02-10 | Resolved: 2022-10-16

## 2022-10-16 PROBLEM — R60.9 PERIPHERAL EDEMA: Status: RESOLVED | Noted: 2022-03-24 | Resolved: 2022-10-16

## 2022-10-16 ASSESSMENT — ENCOUNTER SYMPTOMS: BLOOD IN STOOL: 0

## 2022-10-18 ENCOUNTER — HOSPITAL ENCOUNTER (OUTPATIENT)
Age: 42
Discharge: HOME | End: 2022-10-18
Payer: COMMERCIAL

## 2022-10-18 DIAGNOSIS — D64.9 ANEMIA, UNSPECIFIED TYPE: ICD-10-CM

## 2022-10-18 DIAGNOSIS — E55.9 VITAMIN D DEFICIENCY: ICD-10-CM

## 2022-10-18 DIAGNOSIS — I10 ESSENTIAL HYPERTENSION: ICD-10-CM

## 2022-10-18 LAB
ABSOLUTE EOS #: 0.1 K/UL (ref 0–0.44)
ABSOLUTE IMMATURE GRANULOCYTE: <0.03 K/UL (ref 0–0.3)
ABSOLUTE LYMPH #: 1.41 K/UL (ref 1.1–3.7)
ABSOLUTE MONO #: 0.57 K/UL (ref 0.1–1.2)
ALBUMIN SERPL-MCNC: 3.9 G/DL (ref 3.5–5.2)
ALBUMIN/GLOBULIN RATIO: 1.2 (ref 1–2.5)
ALP BLD-CCNC: 69 U/L (ref 35–104)
ALT SERPL-CCNC: 6 U/L (ref 5–33)
ANION GAP SERPL CALCULATED.3IONS-SCNC: 11 MMOL/L (ref 9–17)
AST SERPL-CCNC: 15 U/L
BASOPHILS # BLD: 1 % (ref 0–2)
BASOPHILS ABSOLUTE: 0.03 K/UL (ref 0–0.2)
BILIRUB SERPL-MCNC: 0.4 MG/DL (ref 0.3–1.2)
BUN BLDV-MCNC: 5 MG/DL (ref 6–20)
CALCIUM SERPL-MCNC: 8.9 MG/DL (ref 8.6–10.4)
CHLORIDE BLD-SCNC: 101 MMOL/L (ref 98–107)
CO2: 27 MMOL/L (ref 20–31)
CREAT SERPL-MCNC: 0.44 MG/DL (ref 0.5–0.9)
EOSINOPHILS RELATIVE PERCENT: 2 % (ref 1–4)
FERRITIN: 65 NG/ML (ref 13–150)
FOLATE: 15.8 NG/ML
GFR SERPL CREATININE-BSD FRML MDRD: >60 ML/MIN/1.73M2
GLUCOSE BLD-MCNC: 88 MG/DL (ref 70–99)
HCT VFR BLD CALC: 39.4 % (ref 36.3–47.1)
HEMOGLOBIN: 12 G/DL (ref 11.9–15.1)
IMMATURE GRANULOCYTES: 0 %
IRON SATURATION: 42 % (ref 20–55)
IRON: 125 UG/DL (ref 37–145)
LYMPHOCYTES # BLD: 23 % (ref 24–43)
MAGNESIUM: 1.8 MG/DL (ref 1.6–2.6)
MCH RBC QN AUTO: 29.6 PG (ref 25.2–33.5)
MCHC RBC AUTO-ENTMCNC: 30.5 G/DL (ref 28.4–34.8)
MCV RBC AUTO: 97 FL (ref 82.6–102.9)
MONOCYTES # BLD: 9 % (ref 3–12)
NRBC AUTOMATED: 0 PER 100 WBC
PDW BLD-RTO: 14.2 % (ref 11.8–14.4)
PLATELET # BLD: 335 K/UL (ref 138–453)
PMV BLD AUTO: 10.7 FL (ref 8.1–13.5)
POTASSIUM SERPL-SCNC: 3.8 MMOL/L (ref 3.7–5.3)
RBC # BLD: 4.06 M/UL (ref 3.95–5.11)
SEG NEUTROPHILS: 65 % (ref 36–65)
SEGMENTED NEUTROPHILS ABSOLUTE COUNT: 4.1 K/UL (ref 1.5–8.1)
SODIUM BLD-SCNC: 139 MMOL/L (ref 135–144)
TOTAL IRON BINDING CAPACITY: 297 UG/DL (ref 250–450)
TOTAL PROTEIN: 7.2 G/DL (ref 6.4–8.3)
UNSATURATED IRON BINDING CAPACITY: 172 UG/DL (ref 112–347)
URIC ACID: 4.1 MG/DL (ref 2.4–5.7)
VITAMIN B-12: 385 PG/ML (ref 232–1245)
VITAMIN D 25-HYDROXY: 29.2 NG/ML
WBC # BLD: 6.2 K/UL (ref 3.5–11.3)

## 2022-10-18 PROCEDURE — 36415 COLL VENOUS BLD VENIPUNCTURE: CPT

## 2022-10-18 PROCEDURE — 85025 COMPLETE CBC W/AUTO DIFF WBC: CPT

## 2022-10-18 PROCEDURE — 83550 IRON BINDING TEST: CPT

## 2022-10-18 PROCEDURE — 80053 COMPREHEN METABOLIC PANEL: CPT

## 2022-10-18 PROCEDURE — 83735 ASSAY OF MAGNESIUM: CPT

## 2022-10-18 PROCEDURE — 82746 ASSAY OF FOLIC ACID SERUM: CPT

## 2022-10-18 PROCEDURE — 82607 VITAMIN B-12: CPT

## 2022-10-18 PROCEDURE — 82306 VITAMIN D 25 HYDROXY: CPT

## 2022-10-18 PROCEDURE — 82728 ASSAY OF FERRITIN: CPT

## 2022-10-18 PROCEDURE — 84550 ASSAY OF BLOOD/URIC ACID: CPT

## 2022-10-18 PROCEDURE — 83540 ASSAY OF IRON: CPT

## 2022-10-19 ENCOUNTER — CARE COORDINATION (OUTPATIENT)
Dept: CASE MANAGEMENT | Age: 42
End: 2022-10-19

## 2022-10-19 ENCOUNTER — HOSPITAL ENCOUNTER (OUTPATIENT)
Dept: PULMONOLOGY | Age: 42
Discharge: HOME | End: 2022-10-19
Payer: COMMERCIAL

## 2022-10-19 DIAGNOSIS — J18.9 MULTIFOCAL PNEUMONIA: ICD-10-CM

## 2022-10-19 LAB
DLCO %PRED: NORMAL
DLCO PRED: NORMAL
DLCO/VA %PRED: NORMAL
DLCO/VA PRED: NORMAL
DLCO/VA: NORMAL
DLCO: NORMAL
EXPIRATORY TIME: NORMAL
FEF 25-75% %PRED-PRE: NORMAL
FEF 25-75% PRED: NORMAL
FEF 25-75%-PRE: NORMAL
FEV1 %PRED-PRE: NORMAL
FEV1 PRED: NORMAL
FEV1/FVC %PRED-PRE: NORMAL
FEV1/FVC PRED: NORMAL
FEV1/FVC: NORMAL
FEV1: NORMAL
FVC %PRED-PRE: NORMAL
FVC PRED: NORMAL
FVC: NORMAL
GAW %PRED: NORMAL
GAW PRED: NORMAL
GAW: NORMAL
IC %PRED: NORMAL
IC PRED: NORMAL
IC: NORMAL
MVV %PRED-PRE: NORMAL
MVV PRED: NORMAL
MVV-PRE: NORMAL
PEF %PRED-PRE: NORMAL
PEF PRED: NORMAL
PEF-PRE: NORMAL
RAW %PRED: NORMAL
RAW PRED: NORMAL
RAW: NORMAL
RV %PRED: NORMAL
RV PRED: NORMAL
RV: NORMAL
SVC %PRED: NORMAL
SVC PRED: NORMAL
SVC: NORMAL
TLC %PRED: NORMAL
TLC PRED: NORMAL
TLC: NORMAL
VA %PRED: NORMAL
VA PRED: NORMAL
VA: NORMAL
VTG %PRED: NORMAL
VTG PRED: NORMAL
VTG: NORMAL

## 2022-10-19 PROCEDURE — 94729 DIFFUSING CAPACITY: CPT

## 2022-10-19 PROCEDURE — 94726 PLETHYSMOGRAPHY LUNG VOLUMES: CPT

## 2022-10-19 PROCEDURE — 94375 RESPIRATORY FLOW VOLUME LOOP: CPT

## 2022-10-19 PROCEDURE — 94664 DEMO&/EVAL PT USE INHALER: CPT

## 2022-10-19 PROCEDURE — 94060 EVALUATION OF WHEEZING: CPT

## 2022-10-19 PROCEDURE — 6370000000 HC RX 637 (ALT 250 FOR IP): Performed by: FAMILY MEDICINE

## 2022-10-19 RX ORDER — ALBUTEROL SULFATE 90 UG/1
2 AEROSOL, METERED RESPIRATORY (INHALATION) ONCE
Status: COMPLETED | OUTPATIENT
Start: 2022-10-19 | End: 2022-10-19

## 2022-10-19 RX ADMIN — ALBUTEROL SULFATE 2 PUFF: 90 AEROSOL, METERED RESPIRATORY (INHALATION) at 07:38

## 2022-10-19 NOTE — RESULT ENCOUNTER NOTE
Please notify patient: low vitamin D, but improved  Low protein level, increase proteins in the food: diary, chicken  Improving anemia, continue prenatal    Otherwise labs within normal limits  continue current treatment    Future Appointments  10/19/2022 7:15 AM    [facility] RESP THERAPY     [facility] PFT            [facility]  10/19/2022 1:00 PM    SCHEDULE, [facility]  10/27/2022 2:00 PM    KENROY Alanis* STHAN [address]  11/16/2022 9:30 AM    Red Halsted, MD    Resp Spec           Jessica Byrnes  11/17/2022 10:30 AM   TABITHA Hopson -* [facility]

## 2022-10-19 NOTE — PROCEDURES
PFT Interpretation:    Obstructive Ventilatory defect of moderate severity is noted. Evidence of air trapping  is noted. Diffusion Capacity is normal.   No Significant improvement is noted after bronchodilators.     Radha Fink MD

## 2022-10-19 NOTE — CARE COORDINATION
[facility] Care Transitions Follow Up Call    Care Transition Nurse contacted the patient by telephone to follow up after admission on 22. Verified name and  with patient as identifiers. Patient: David Castelan  Patient : 1980   MRN: [id number]  Reason for Admission: Sepsis  Discharge Date: 10/4/22 RARS: Readmission Risk Score: 10.9      Needs to be reviewed by the provider   Additional needs identified to be addressed with provider: No  none             Method of communication with provider: none. Surendra Parikh states that she is doing good. Had a pulmonary function test today. States that she has a F/U appointment with her pulmonologist in a couple of weeks to see how she did. Denies fever, SOB, cough, fever. Is eating and sleeping well. No additional adverse s/s to report. Addressed changes since last contact:  none  Discussed follow-up appointments. If no appointment was previously scheduled, appointment scheduling offered: completed prior call. .   Is follow up appointment scheduled within 7 days of discharge? Yes. Follow Up  Future Appointments   Date Time Provider Shaji Ortega   10/19/2022  1:00 PM [facility]EDU[facility]   10/27/2022  2:00 PM Lottie Downey, APRN - CNP ST[facility] [address]   2022  9:30 AM Eber Menard MD Resp Spec [facility] Haily   2022 10:30 AM Susie Mathews APRN - CNP fp sc [address] Transition Nurse reviewed medical action plan and red flags with patient and discussed any barriers to care and/or understanding of plan of care after discharge. Discussed appropriate site of care based on symptoms and resources available to patient including: PCP  Specialist. The patient agrees to contact the PCP office for questions related to their healthcare. Advance Care Planning:   not on file.      Patients top risk factors for readmission: medical condition-Sepsis, PNE  Interventions to address risk factors: Education of patient/family/caregiver/guardian to support self-management-see note and Assessment and support for treatment adherence and medication management-nurse assessment    Offered patient enrollment in the Remote Patient Monitoring (RPM) program for in-home monitoring: Patient is not eligible for RPM program.     Care Transitions Subsequent and Final Call    Subsequent and Final Calls  Do you have any ongoing symptoms?: No  Have your medications changed?: No  Do you have any questions related to your medications?: No  Do you currently have any active services?: No  Do you have any needs or concerns that I can assist you with?: No  Care Transitions Interventions  Other Interventions:             Care Transition Nurse provided contact information for future needs. Plan for follow-up call in 5-7 days based on severity of symptoms and risk factors.   Plan for next call: symptom management-assess  self management-assess    Bernarda Rosas RN

## 2022-10-20 NOTE — RESULT ENCOUNTER NOTE
Please notify patient: Pulmonary test shows moderate COPD or asthma    Follow-up with pulmonologist as scheduled,  How often is she using the albuterol inhaler?   If she has been using the albuterol inhaler more than 2-3 times per week we will need to add an additional inhaler  Future Appointments  10/27/2022 2:00 PM    KENROY Magallon* [address] [facility]  11/16/2022 9:30 AM    Linh Martinez MD    Resp Spec           [facility]  11/17/2022 10:30 AM   TABITHA Hopson -* fp sc               [facility]PP

## 2022-10-20 NOTE — RESULT ENCOUNTER NOTE
Duplicate result  Pulmonary test shows moderate COPD or asthma    Follow-up with pulmonologist as scheduled,  How often is she using the albuterol inhaler?   If she has been using the albuterol inhaler more than 2-3 times per week we will need to add an additional inhaler    Future Appointments  10/27/2022 2:00 PM    KENROY Hull* [address] [facility]  11/16/2022 9:30 AM    Braulio Suarez MD    Resp Spec           [address] [facility]  11/17/2022 10:30 AM   TABITHA Hopson -* fp sc               M[facility]

## 2022-10-25 ENCOUNTER — CARE COORDINATION (OUTPATIENT)
Dept: CASE MANAGEMENT | Age: 42
End: 2022-10-25

## 2022-10-25 NOTE — CARE COORDINATION
[facility] Care Transitions Follow Up Call    Care Transition Nurse contacted the patient by telephone to follow up after admission on 22. Verified name and  with patient as identifiers. Patient: Jenni Monroy  Patient : 1980   MRN: [id number]  Reason for Admission: Tachypnea/pneumonia/Sepsis  Discharge Date: 10/4/22 RARS: Readmission Risk Score: 10.9      Needs to be reviewed by the provider   Additional needs identified to be addressed with provider: No  none             Method of communication with provider: none. Attempt to reach patient for Care Transitions. [facility] requesting return call. Contact information provided. [phone]    Alejandra returned call. She stated cherie she was doing well. She said that she is still relying on the inhaler to help her breath better. She denied any increased shortness of breath and her blood pressures have been under more control since her PCP increased her Lisinopril dose. She will be having a follow up with pulmonology on . She had no further concerns or questions. Informed of final outreach. Addressed changes since last contact:  none  Discussed follow-up appointments. Follow Up  Future Appointments   Date Time Provider Shaji Ortega   10/27/2022  2:00 PM TABITHA Arreola CNP [facility] [address]   2022  9:30 AM Saturnino Soria MD Resp Spec [address] [place] [address]   2022 10:30 AM [facility]     [facility] follow up appointment(s):     Care Transition Nurse reviewed medical action plan and red flags with patient and discussed any barriers to care and/or understanding of plan of care after discharge. Discussed appropriate site of care based on symptoms and resources available to patient including: PCP  Specialist  When to call 911. The patient agrees to contact the PCP office for questions related to their healthcare.      Patients top risk factors for readmission: lack of knowledge about disease and medical condition-Pneumonia/sepsis/COPD  Interventions to address risk factors: Obtained and reviewed discharge summary and/or continuity of care documents    Offered patient enrollment in the Remote Patient Monitoring (RPM) program for in-home monitoring: Patient is not eligible for RPM program.     Care Transitions Subsequent and Final Call    Subsequent and Final Calls  Care Transitions Interventions  Other Interventions:             Care Transition Nurse provided contact information for future needs. No further follow-up call indicated based on severity of symptoms and risk factors. Plan for next call:  final call episode ended.     Elliott Madrid RN

## 2022-11-02 DIAGNOSIS — G89.29 CHRONIC MIDLINE LOW BACK PAIN WITH RIGHT-SIDED SCIATICA: ICD-10-CM

## 2022-11-02 DIAGNOSIS — M54.41 CHRONIC MIDLINE LOW BACK PAIN WITH RIGHT-SIDED SCIATICA: ICD-10-CM

## 2022-11-03 RX ORDER — NAPROXEN 500 MG/1
500 TABLET ORAL 2 TIMES DAILY PRN
Qty: 60 TABLET | Refills: 0 | Status: SHIPPED | OUTPATIENT
Start: 2022-11-03 | End: 2022-11-17

## 2022-11-03 NOTE — TELEPHONE ENCOUNTER
Please Approve or Refuse.   Send to Pharmacy per Pt's Request:      Next Visit Date:  11/17/2022   Last Visit Date: 10/13/2022    Hemoglobin A1C (%)   Date Value   03/21/2022 5.1             ( goal A1C is < 7)   BP Readings from Last 3 Encounters:   10/04/22 (!) 142/107   09/01/22 (!) 149/101   07/25/22 138/86          (goal 120/80)  BUN   Date Value Ref Range Status   10/18/2022 5 (L) 6 - 20 mg/dL Final     Creatinine   Date Value Ref Range Status   10/18/2022 0.44 (L) 0.50 - 0.90 mg/dL Final     Potassium   Date Value Ref Range Status   10/18/2022 3.8 3.7 - 5.3 mmol/L Final

## 2022-11-07 ENCOUNTER — HOSPITAL ENCOUNTER (OUTPATIENT)
Dept: GENERAL RADIOLOGY | Age: 42
Discharge: HOME | End: 2022-11-09
Payer: COMMERCIAL

## 2022-11-07 ENCOUNTER — HOSPITAL ENCOUNTER (OUTPATIENT)
Age: 42
Discharge: HOME | End: 2022-11-09
Payer: COMMERCIAL

## 2022-11-07 DIAGNOSIS — J18.9 MULTIFOCAL PNEUMONIA: ICD-10-CM

## 2022-11-07 PROCEDURE — 71046 X-RAY EXAM CHEST 2 VIEWS: CPT

## 2022-11-07 NOTE — RESULT ENCOUNTER NOTE
Please notify patient: Normal chest x-ray     Future Appointments  11/8/2022  10:20 AM   TABITHA Barbosa - CNP    [id number] [facility] [place]  11/16/2022 9:30 AM    Trent Whitney MD    Resp Spec           Herbie Hagen  11/17/2022 10:30 AM   TABITHA Hopson -* fp sc               [id number]

## 2022-11-08 ENCOUNTER — HOSPITAL ENCOUNTER (OUTPATIENT)
Dept: PAIN MANAGEMENT | Age: 42
Discharge: HOME | End: 2022-11-08
Payer: COMMERCIAL

## 2022-11-08 VITALS
HEIGHT: 68 IN | BODY MASS INDEX: 23.79 KG/M2 | SYSTOLIC BLOOD PRESSURE: 147 MMHG | DIASTOLIC BLOOD PRESSURE: 95 MMHG | OXYGEN SATURATION: 100 % | HEART RATE: 70 BPM | WEIGHT: 157 LBS | RESPIRATION RATE: 18 BRPM

## 2022-11-08 DIAGNOSIS — M54.16 LUMBAR RADICULOPATHY, CHRONIC: ICD-10-CM

## 2022-11-08 DIAGNOSIS — M54.41 CHRONIC MIDLINE LOW BACK PAIN WITH RIGHT-SIDED SCIATICA: Primary | ICD-10-CM

## 2022-11-08 DIAGNOSIS — M50.30 DEGENERATION OF CERVICAL INTERVERTEBRAL DISC: ICD-10-CM

## 2022-11-08 DIAGNOSIS — G89.29 CHRONIC MIDLINE LOW BACK PAIN WITH RIGHT-SIDED SCIATICA: Primary | ICD-10-CM

## 2022-11-08 DIAGNOSIS — G89.29 CHRONIC NECK PAIN: ICD-10-CM

## 2022-11-08 DIAGNOSIS — M54.2 CHRONIC NECK PAIN: ICD-10-CM

## 2022-11-08 PROCEDURE — 99213 OFFICE O/P EST LOW 20 MIN: CPT

## 2022-11-08 PROCEDURE — 99214 OFFICE O/P EST MOD 30 MIN: CPT | Performed by: NURSE PRACTITIONER

## 2022-11-08 ASSESSMENT — ENCOUNTER SYMPTOMS
BOWEL INCONTINENCE: 0
SHORTNESS OF BREATH: 0
BACK PAIN: 1
COUGH: 0
CONSTIPATION: 0

## 2022-11-08 NOTE — PROGRESS NOTES
Chief Complaint   Patient presents with    Back Pain    Neck Pain       PMH     70-year-old female with history of chronic neck and back pain with no known injury or surgery to the areas   Onset of symptom more than 10 years ago - was treated at a different clinic with injections that were helpful and is interested in repeating  Reports neck pain with radiation down right arm associated with right arm numbness and tingling  Also pain located in the right side lower lumbar area with radiation down right leg all the way to the foot associated with intermittent right leg numbness and tingling  No changes in bladder or bowel control  No history fever chills or weight loss  No previous MRI lumbar spine or cervical spine  No previous physical therapy  Currently taking gabapentin and Zanaflex     HPI:     Back Pain  This is a chronic problem. The current episode started more than 1 year ago. The problem occurs constantly. The problem is unchanged. The pain is present in the lumbar spine. The quality of the pain is described as aching and shooting. The pain radiates to the right thigh, right knee and right foot. The pain is at a severity of 6/10. The pain is moderate. The pain is The same all the time. The symptoms are aggravated by bending, position and twisting. Stiffness is present In the morning. Associated symptoms include numbness, tingling and weakness. Pertinent negatives include no bladder incontinence, bowel incontinence, chest pain or fever. She has tried bed rest, home exercises, heat, ice and walking for the symptoms. Neck Pain   This is a chronic problem. The current episode started more than 1 year ago. The problem occurs constantly. The problem has been unchanged. The pain is associated with nothing. The pain is present in the midline. The quality of the pain is described as aching and shooting. The pain is at a severity of 6/10. The pain is moderate.  The symptoms are aggravated by bending, position and twisting. The pain is Same all the time. Stiffness is present In the morning. Associated symptoms include numbness, tingling and weakness. Pertinent negatives include no chest pain or fever. She has tried bed rest, ice, home exercises, heat and neck support for the symptoms. Past Medical History:   Diagnosis Date    Acute respiratory failure with hypoxia ([address] 9/30/2022    Advanced maternal age in multigravida 2/10/2021    Anxiety 2/13/2015    Chronic midline low back pain with right-sided sciatica 5/24/2022    Degeneration of cervical intervertebral disc 12/21/2020    Depression 12/5/2012    Dichorionic diamniotic twin pregnancy, antepartum 2/10/2021    Formatting of this note might be different from the original. Serial growth US  Growth US  - 2/4/77: A cephalic, anterior, eccentric cord insertion, INDERJIT lg pocket 4.14, EFW 340g (59%)  - B transverse head right,posterior, marginal cord insertion, INDERJIT lg pocket 4.05 cm, EFW 319g (39%)   - 5/6 : A cephalic, INDERJIT wnl, EFW 021Y (41%)  - B cephalic, INDERJIT wnl, EFW 263F (38%)  [ ] next due 6/3    Essential hypertension 12/21/2020    Gastro-esophageal reflux disease without esophagitis 12/21/2020    Hx of preeclampsia, prior pregnancy, currently pregnant 2/10/2021    Formatting of this note might be different from the original.  4/2019  Delivered at 33 weeks gestation for preeclampsia with severe features.  2/19/2021  CMP Nml     Urine PC Ratio:  0.11    Lumbar radiculopathy, acute 2/27/2013    Maternal antithrombin III deficiency complicating pregnancy ([address] 3/8/2021    Formatting of this note might be different from the original. 77% (83- 1328%) - mild deficiency 2/2021 [ ] Has Hematology appt 5/13 (no showed)  High risk thrombophilia Lovenox therapy    Opioid use, unspecified with other opioid-induced disorder (HCC)(Suboxone) 12/21/2020    Pneumonia due to infectious organism 9/28/2022    Sepsis ([address] 9/28/2022       Past Surgical History:   Procedure Laterality Date    TUBAL LIGATION  01/27/2022       Allergies   Allergen Reactions    Other      seasonal         Current Outpatient Medications:     naproxen (NAPROSYN) 500 MG tablet, Take 1 tablet by mouth 2 times daily as needed for Pain Take only if needed, for pain, long-term use can cause stomach ulcers and kidney failure, Disp: 60 tablet, Rfl: 0    lisinopril (PRINIVIL;ZESTRIL) 10 MG tablet, Take 1 tablet by mouth daily Dose increased 10/13/2022. Goal BP bellow 135/85 and above 115/65, heart rate 56 to 90 bpm, Disp: 90 tablet, Rfl: 3    albuterol sulfate HFA (PROVENTIL HFA) 108 (90 Base) MCG/ACT inhaler, Inhale 2 puffs into the lungs every 6 hours as needed for Wheezing or Shortness of Breath Okay to substitute, Disp: 8 g, Rfl: 0    gabapentin (NEURONTIN) 100 MG capsule, Take 1 capsule by mouth 3 times daily for 30 days.  TAKE 1 CAPSULE BY MOUTH EVERY MORNING, 1 CAPSULE AT NOON AND 1 CAPSULE BEFORE BEDTIME, Disp: 90 capsule, Rfl: 0    tiZANidine (ZANAFLEX) 2 MG tablet, TAKE 1 TO 2 TABLETS BY MOUTH AT NIGHT, Disp: 60 tablet, Rfl: 2    fluticasone (FLONASE) 50 MCG/ACT nasal spray, INSTILL 1 SPRAY BY EACH NOSTRIL ROUTE DAILY, Disp: 16 g, Rfl: 2    naloxone 4 MG/0.1ML LIQD nasal spray, 1 spray by Nasal route as needed, Disp: , Rfl:     Prenatal Vit-Fe Fumarate-FA (PRENATAL VITAMINS) 28-0.8 MG TABS, Take 1 tablet by mouth daily, Disp: 90 tablet, Rfl: 2    vitamin D (CHOLECALCIFEROL) 25 MCG (1000 UT) TABS tablet, Take 1 tablet by mouth daily, Disp: 90 tablet, Rfl: 5    buprenorphine-naloxone (SUBOXONE) 8-2 MG FILM SL film, , Disp: , Rfl:     Family History   Problem Relation Age of Onset    Heart Disease Mother     Breast Cancer Mother 36    Diabetes Father     Breast Cancer Maternal Aunt 36       Social History     Socioeconomic History    Marital status: Single     Spouse name: Not on file    Number of children: Not on file    Years of education: Not on file    Highest education level: Not on file   Occupational History    Not on file   Tobacco Use    Smoking status: Former     Years: 10.00     Types: Cigarettes    Smokeless tobacco: Never    Tobacco comments:     1 pack every two days   Substance and Sexual Activity    Alcohol use: Not Currently     Comment: social    Drug use: No    Sexual activity: Not on file   Other Topics Concern    Not on file   Social History Narrative    Not on file     Social Determinants of Health     Financial Resource Strain: Low Risk     Difficulty of Paying Living Expenses: Not hard at all   Food Insecurity: No Food Insecurity    Worried About Running Out of Food in the Last Year: Never true    [address] in the Last Year: Never true   Transportation Needs: No Transportation Needs    Lack of Transportation (Medical): No    Lack of Transportation (Non-Medical): No   Physical Activity: Not on file   Stress: Not on file   Social Connections: Not on file   Intimate Partner Violence: Not on file   Housing Stability: Unknown    Unable to Pay for Housing in the Last Year: No    Number of Places Lived in the Last Year: Not on file    Unstable Housing in the Last Year: No       Review of Systems:  Review of Systems   Constitutional: Negative for chills and fever. Cardiovascular:  Negative for chest pain. Respiratory:  Negative for cough and shortness of breath. Musculoskeletal:  Positive for back pain and neck pain. Gastrointestinal:  Negative for bowel incontinence and constipation. Genitourinary:  Negative for bladder incontinence. Neurological:  Positive for numbness, tingling and weakness. Physical Exam:  BP (!) 147/95   Pulse 70   Resp 18   Ht 5' 8\" (1.727 m)   Wt 157 lb (71.2 kg)   SpO2 100%   BMI 23.87 kg/m²     Physical Exam  Cardiovascular:      Rate and Rhythm: Normal rate. Pulmonary:      Effort: Pulmonary effort is normal.   Musculoskeletal:      Cervical back: Decreased range of motion. Lumbar back: Decreased range of motion.    Skin:     General: Skin is warm and dry. Neurological:      Mental Status: She is alert and oriented to person, place, and time. Assessment:  Problem List Items Addressed This Visit       Chronic midline low back pain with right-sided sciatica - Primary    Relevant Orders    Ambulatory referral to Physical Therapy    Chronic neck pain    Relevant Orders    Ambulatory referral to Physical Therapy    Lumbar radiculopathy, chronic    Relevant Orders    Ambulatory referral to Physical Therapy    Degeneration of cervical intervertebral disc    Relevant Orders    Ambulatory referral to Physical Therapy          Treatment Plan:    Referral to physical therapy to work on cervical and lumbar pain and core strengthening   If no change in pain after PT consider updating imaging   Follow up appointment made for 4 weeks    I have reviewed the chief complaint and history of present illness (including ROS and PFSH) and vital documentation by my staff and I agree with their documentation and have added where applicable.

## 2022-11-09 DIAGNOSIS — G89.29 CHRONIC MIDLINE LOW BACK PAIN WITH RIGHT-SIDED SCIATICA: ICD-10-CM

## 2022-11-09 DIAGNOSIS — M54.41 CHRONIC MIDLINE LOW BACK PAIN WITH RIGHT-SIDED SCIATICA: ICD-10-CM

## 2022-11-09 DIAGNOSIS — G89.29 CHRONIC NECK PAIN: ICD-10-CM

## 2022-11-09 DIAGNOSIS — M54.2 CHRONIC NECK PAIN: ICD-10-CM

## 2022-11-09 RX ORDER — GABAPENTIN 100 MG/1
CAPSULE ORAL
Qty: 90 CAPSULE | Refills: 0 | Status: SHIPPED | OUTPATIENT
Start: 2022-11-09 | End: 2022-12-09

## 2022-11-09 NOTE — TELEPHONE ENCOUNTER
[facility] is calling to request a refill on the following medication(s):    Medication Request:  Requested Prescriptions     Pending Prescriptions Disp Refills    gabapentin (NEURONTIN) 100 MG capsule [Pharmacy Med Name: Gabapentin 100MG CAPS] 90 capsule 0     Sig: TAKE 1 CAPSULE BY MOUTH EVERY MORNING, 1 CAPSULE AT NOON AND 1 CAPSULE BEFORE BEDTIME       Last Visit Date (If Applicable):  9/19/2531    Next Visit Date:    11/17/2022

## 2022-11-14 ASSESSMENT — ENCOUNTER SYMPTOMS
RESPIRATORY NEGATIVE: 1
ABDOMINAL PAIN: 0
BACK PAIN: 1
SHORTNESS OF BREATH: 0
WHEEZING: 0

## 2022-11-14 NOTE — PROGRESS NOTES
[facility] PHYSICIANS  [facility] [address] [facility] 2.  [address] [address]  Dept: Cb Sosa (:  1980) is a 43 y.o. female. Patient is here for evaluation of the following chief complaint(s):  Chief Complaint   Patient presents with    Hypertension      SUBJECTIVE/OBJECTIVE:  SUSANA Carter is a 43 y.o. female patient. Patient is an established patient of mine. Patient has a known history of hypertension, low back pain, chronic neck pain, vitamin D deficiency, vitamin B12 deficiency, depression and anxiety, on suboxone. .  She is also here today to review her recent lab work. Father was recently diagnosed with stage IV cancer  Patient did everything we discussed last time including follow-up with the pulmonologist.    Shahida Morales has a well controlled hypertension. she is currently on lisinopril- 10 mg. Patient's most recent BP in the office was high. she reports stable BP readings at home. Patient denies any adverse reaction to this therapy. she denies any CP, SOB, HA, or palpitations. Patient admits to exercising and adheres to low salt diet. No history of organ damage due to condition noted. Lab Results   Component Value Date/Time    CREATININE 0.44 (L) 10/18/2022 03:17 PM     BP Readings from Last 3 Encounters:   22 126/80   22 137/88   22 (!) 147/95     CERVICAL SPINE  Patient complains of some chronic neck pain. This is chronic. Patient states that she has had this for a while. She has been having this for few months and have noticed some intermittent pain patient reported a previous relationship with a pain management doctor and have had a few injections but does not remember who it was. She denies any bowel or bladder issues. No pain radiations no weakness or falls.  She wants to get established with a new pain management specialist. She wants to see them before trying physical therapy again, which she has already done. Will start gabapentin as well in the meantime. She also takes naproxen and tizanidine as needed. She states she gets minimal relief with these. Impression   Unremarkable examination of the lumbar spine. LUMBAR SPINE/ NECK PAIN/ KNEE PAIN  Patient reports mid low back pain she describes as achy sometimes stiff. She does report some pain radiation to the right buttocks to the right knee. She also reports some chronic right knee pain. She has had a previous x-rays in the past this is not visible to us today during the visit. Despite having been established with a pain management doctor patient would like to see another group of pain management doctors within [facility] Patient has been using some heat packs. She is currently on Suboxone due to a previous opiate use. She denies any relief with home remedies. Patient currently on  gabapentin, and Tizanidine. Reports some relief. Impression   No acute abnormality of the cervical spine. No significant degenerative changes     ANEMIA  Alejandra De La Cruz is a 43 y.o. female patient with a known Iron deficiency anemia. Associated signs & symptoms: fatigue. Current treatment prenatal vitamins. .  Lab Results   Component Value Date/Time    HGB 12.0 10/18/2022 03:17 PM    HCT 39.4 10/18/2022 03:17 PM    LABIRON 42 10/18/2022 03:17 PM    FERRITIN 65 10/18/2022 03:17 PM    TIBC 297 10/18/2022 03:17 PM    UIBC 172 10/18/2022 03:17 PM    IRON 125 10/18/2022 03:17 PM    [id number] 385 10/18/2022 03:17 PM    FOLATE 15.8 10/18/2022 03:17 PM      PROTEIN- CALORIE MALNUTRITION--improving  Alejandra De La Cruz is a 43 y.o. female patient currently being treated for condition with 1 Protein shakes. Doing well. Lab Results   Component Value Date/Time    LABALBU 3.9 10/18/2022 03:17 PM    LABALBU 3.5 09/28/2022 05:45 PM    PROT 7.2 10/18/2022 03:17 PM    PROT 6.9 09/28/2022 05:45 PM     VITAMIN D  Patient has a known Vitamin D Deficiency. she Usually covered with 12 weeks weekly dose then daily. she is due to get levels check. We continue to discuss ways to manage this condition. We also discussed ways to improve the levels. Such as learning food groups that are high in Vitamin D. We also discuss that sun exposure is beneficial however, too much sun and sun damage can potentially result in skin cancer. Lab Results   Component Value Date/Time    VITD25 29.2 (L) 10/18/2022 03:17 PM       VITAMIN B 12 /FOLATE DEFICIENCIES  Alejandra  is  taking Vitamin B12 supplementation. Alejandra reports some numbness and tingling and fatigue. Lab Results   Component Value Date    [id number] 282 10/18/2022     Lab Results   Component Value Date/Time    FOLATE 15.8 10/18/2022 03:17 PM     DEPRESSION with Spencer Parker reported some ongoing issues with depression and anxiety. Father was recently diagnosed with stage IV cancer. Patient states that she was on 1 type of antidepressant several years ago does not remember what it is but she does not remember how it made her feel. Today, she is willing to try a therapy includes will try Cymbalta, . she also denies suicidal/homicidal ideation, plan or intent. .  PHQ-2 Over the past 2 weeks, how often have you been bothered by any of the following problems? Little interest or pleasure in doing things: Not at all  Feeling down, depressed, or hopeless: Several days  PHQ-2 Score: 1  PHQ-9 Over the past 2 weeks, how often have you been bothered by any of the following problems? Trouble falling or staying asleep, or sleeping too much: Several days  Feeling tired or having little energy: Not at all  Poor appetite or overeating: Not at all  Feeling bad about yourself - or that you are a failure or have let yourself or your family down: Not at all  Trouble concentrating on things, such as reading the newspaper or watching television: Not at all  Moving or speaking so slowly that other people could have noticed.  Or the opposite - being so fidgety or restless that you have been moving around a lot more than usual: Not at all  Thoughts that you would be better off dead, or of hurting yourself in some way: Not at all  If you checked off any problems, how difficult have these problems made it for you to do your work, take care of things at home, or get along with other people?: Not difficult at all  PHQ-9 Total Score: 2  PHQ-9 Total Score: 2   TULIO-7 SCREENING 1/18/2021 12/21/2020   Feeling nervous, anxious, or on edge 1-Several days 2-Over half the days   Not able to stop or control worrying 1-Several days 2-Over half the days   Worrying too much about different things 1-Several days 2-Over half the days   Trouble relaxing 1-Several days 0-Not at all   Being so restless that it's hard to sit still 1-Several days 2-Over half the days   Becoming easily annoyed or irritable 1-Several days 2-Over half the days   Feeling afraid as if something awful might happen 1-Several days 0-Not at all   TULIO-7 Total Score 7 10        Patient's blood count is WNL. Lab Results   Component Value Date    WBC 6.2 10/18/2022    HGB 12.0 10/18/2022    HCT 39.4 10/18/2022    MCV 97.0 10/18/2022     10/18/2022    LYMPHOPCT 23 (L) 10/18/2022    RBC 4.06 10/18/2022    MCH 29.6 10/18/2022    MCHC 30.5 10/18/2022    RDW 14.2 10/18/2022       THYROID FUNCTION  Alejandra ALEXANDER Contrerasy  's most recent TSH level was normal. Results reviewed with the patient. Patient denies any history of thyroid disorders. Lab Results   Component Value Date/Time    TSH 1.73 03/21/2022 09:14 AM        Alejandra 's RENAL FUNCTION was found WNL.    Lab Results   Component Value Date     10/18/2022    K 3.8 10/18/2022     10/18/2022    CO2 27 10/18/2022    BUN 5 (L) 10/18/2022    CREATININE 0.44 (L) 10/18/2022    GLUCOSE 88 10/18/2022    CALCIUM 8.9 10/18/2022    PROT 7.2 10/18/2022    GFR      10/03/2022     LFT's WNL    Lab Results   Component Value Date/Time    ALKPHOS 69 10/18/2022 03:17 PM    ALT 6 10/18/2022 03:17 PM    AST 15 10/18/2022 03:17 PM    BILITOT 0.4 10/18/2022 03:17 PM    PROT 7.2 10/18/2022 03:17 PM    LABALBU 3.9 10/18/2022 03:17 PM        Patient's urinalysis results WNL  . Lab Results   Component Value Date/Time    COLORU Yellow 09/28/2022 09:52 PM    NITRU NEGATIVE 09/28/2022 09:52 PM    LEUKOCYTESUR NEGATIVE 09/28/2022 09:52 PM    GLUCOSEU NEGATIVE 09/28/2022 09:52 PM    GLUCOSEU NEGATIVE 05/01/2012 04:35 AM    KETUA NEGATIVE 09/28/2022 09:52 PM    UROBILINOGEN Normal 09/28/2022 09:52 PM    BILIRUBINUR NEGATIVE 09/28/2022 09:52 PM    BILIRUBINUR NEGATIVE 05/01/2012 04:35 AM      DEPRESSION SCREENING: Negative  PHQ Scores 11/17/2022 10/12/2022 5/24/2022 3/24/2022 1/18/2021 12/21/2020   PHQ2 Score 1 0 0 0 0 0   PHQ9 Score 2 0 0 0 0 0     VITAL SIGNS:  Vitals:    11/17/22 1039   BP: 126/80   Pulse: 78   Temp: 97.5 °F (36.4 °C)   SpO2: 98%   Weight: 157 lb (71.2 kg)   Height: 5' 8\" (1.727 m)   Estimated body mass index is 23.87 kg/m² as calculated from the following:    Height as of this encounter: 5' 8\" (1.727 m). Weight as of this encounter: 157 lb (71.2 kg). Review of Systems   Constitutional:  Negative for chills and fever. HENT:  Positive for ear pain. Respiratory: Negative. Negative for shortness of breath and wheezing. Cardiovascular:  Negative for chest pain and palpitations. Gastrointestinal:  Negative for abdominal pain. Endocrine: Negative. Musculoskeletal:  Positive for arthralgias, back pain, gait problem, myalgias, neck pain and neck stiffness. Allergic/Immunologic: Positive for environmental allergies. Neurological:  Negative for headaches. Psychiatric/Behavioral:  Positive for dysphoric mood and sleep disturbance. Negative for suicidal ideas. The patient is nervous/anxious. Physical Exam  Vitals and nursing note reviewed. Constitutional:       Appearance: Normal appearance. HENT:      Head: Normocephalic. Left Ear: Ear canal normal. Laceration and tenderness present. Ears:      Comments: Back of ear-redness and dried serous drainage     Nose: Nose normal.   Cardiovascular:      Rate and Rhythm: Normal rate and regular rhythm. Pulmonary:      Effort: Pulmonary effort is normal.      Breath sounds: Normal breath sounds. Musculoskeletal:      Cervical back: No spasms. Decreased range of motion. Lumbar back: Spasms present. Decreased range of motion. Positive right straight leg raise test. Negative left straight leg raise test.   Skin:     General: Skin is warm and dry. Neurological:      Mental Status: She is alert and oriented to person, place, and time. Psychiatric:         Mood and Affect: Mood is anxious. Speech: Speech is not rapid and pressured. Thought Content: Thought content does not include suicidal ideation.        MEDICAL HISTORY      Diagnosis Date    Acute respiratory failure with hypoxia ([facility] [address] 9/30/2022    Advanced maternal age in multigravida 2/10/2021    Anxiety 2/13/2015    Chronic midline low back pain with right-sided sciatica 5/24/2022    Degeneration of cervical intervertebral disc 12/21/2020    Depression 12/5/2012    Dichorionic diamniotic twin pregnancy, antepartum 2/10/2021    Formatting of this note might be different from the original. Serial growth US  Growth US  - [birth year]/5/[birth year]: A cephalic, anterior, eccentric cord insertion, INDERJIT lg pocket 4.14, EFW 340g (59%)  - B transverse head right,posterior, marginal cord insertion, INDERJIT lg pocket 4.05 cm, EFW 319g (39%)   - 5/6 : A cephalic, INDERJIT wnl, EFW 594Q (25%)  - B cephalic, INDERJIT wnl, EFW 729Y (38%)  [ ] next due 6/3    Essential hypertension 12/21/2020    Gastro-esophageal reflux disease without esophagitis 12/21/2020    Hx of preeclampsia, prior pregnancy, currently pregnant 2/10/2021    Formatting of this note might be different from the original.  4/2019  Delivered at 33 weeks gestation for preeclampsia with severe features. 2/19/2021  CMP Nml     Urine PC Ratio:  0.11    Lumbar radiculopathy, acute 2/27/2013    Maternal antithrombin III deficiency complicating pregnancy ([facility] [address] 3/8/2021    Formatting of this note might be different from the original. 77% (83- 1328%) - mild deficiency 2/2021 [ ] Has Hematology appt 5/13 (no showed)  High risk thrombophilia Lovenox therapy    Opioid use, unspecified with other opioid-induced disorder (HCC)(Suboxone) 12/21/2020    Pneumonia due to infectious organism 9/28/2022    Sepsis ([facility] [address] 9/28/2022      MEDICATIONS  Prior to Visit Medications    Medication Sig Taking? Authorizing Provider   DULoxetine (CYMBALTA) 30 MG extended release capsule Take 1 capsule by mouth daily Yes TABITHA Hopson CNP   gabapentin (NEURONTIN) 100 MG capsule TAKE 1 CAPSULE BY MOUTH EVERY MORNING, 1 CAPSULE AT NOON AND 1 CAPSULE BEFORE BEDTIME Yes TABITHA Hopson CNP   lisinopril (PRINIVIL;ZESTRIL) 10 MG tablet Take 1 tablet by mouth daily Dose increased 10/13/2022.  Goal BP bellow 135/85 and above 115/65, heart rate 56 to 90 bpm Yes Johana Ortega MD   albuterol sulfate HFA (PROVENTIL HFA) 108 (90 Base) MCG/ACT inhaler Inhale 2 puffs into the lungs every 6 hours as needed for Wheezing or Shortness of Breath Okay to substitute Yes Lluvia Solorzano MD   tiZANidine (ZANAFLEX) 2 MG tablet TAKE 1 TO 2 TABLETS BY MOUTH AT NIGHT Yes TABITHA Hopson CNP   fluticasone (FLONASE) 50 MCG/ACT nasal spray INSTILL 1 SPRAY BY EACH NOSTRIL ROUTE DAILY Yes TABITHA Hopson CNP   naloxone 4 MG/0.1ML LIQD nasal spray 1 spray by Nasal route as needed Yes Historical Provider, MD   Prenatal Vit-Fe Fumarate-FA (PRENATAL VITAMINS) 28-0.8 MG TABS Take 1 tablet by mouth daily Yes TABITHA Hopson CNP   buprenorphine-naloxone (SUBOXONE) 8-2 MG FILM SL film  Yes Historical Provider, MD   vitamin D (CHOLECALCIFEROL) 25 MCG (1000 UT) TABS tablet Take 1 tablet by mouth daily  Susie Mathews, APRN - CNP     Controlled Substance Monitoring:  Acute and Chronic Pain Monitoring:   RX Monitoring 11/17/2022   Attestation -   Periodic Controlled Substance Monitoring No signs of potential drug abuse or diversion identified. ASSESSMENT/PLAN:  1. Essential hypertension  Stable  Continue current therapy. DISCUSSED AND ADVISED TO:  Cut down on your salt intake. Cut down on caffeinated drinks, sports drinks. Instructed to check BP at home regularly. Report for any chest pains, shortness of breath, headaches, and lightheadedness. Call the office if your blood pressure continue to be higher than 140/90 or 90/50. 2. Chronic midline low back pain with right-sided sciatica  Failure to Improve  Continue current therapy. DISCUSSED AND ADVISED TO:  Use heat packs 15 to 20 mins every 2-3 hours. Do some back stretches as tolerated. Refer to hand out for instructions. Call for worsening, numbness, weakness.  - Handicap Placard MISC; by Does not apply route Expires in 5 years  11/17/2027  Dispense: 1 each; Refill: 0    3. Chronic neck pain  Failure to Improve  Continue current therapy. DISCUSSED and ADVISED TO:  Stay at a healthy weight. Continue exercises/PT  Stretch to help prevent stiffness and to prevent injury before exercise. Gentle forms of yoga help keep joints and muscles flexible. Walk instead of jog, ride a bike, swim, and water exercise. Lift weights as tolerated. strong muscles help reduce stress on joints. Take pain medicines exactly as directed and only as needed. - Handicap Placard MISC; by Does not apply route Expires in 5 years  11/17/2027  Dispense: 1 each; Refill: 0    4. Adjustment reaction with anxiety and depression  Worsening  Start Cymbalta  Follow-up in 4 weeks  Continue current therapy. DISCUSSED and ADVISED TO:  Not stopping medication suddenly. See the specialist as discussed. Report for feelings of SI, HI, and hallucinations.   Go to the ER for increasing urge to hurt yourself. - DULoxetine (CYMBALTA) 30 MG extended release capsule; Take 1 capsule by mouth daily  Dispense: 30 capsule; Refill: 1    5. Protein malnutrition ([address]  Improving  Continue shake a day      6. Vitamin B12 deficiency  Stable  Schedule monthly vitamin B12 injection  Continue to monitor    - cyanocobalamin injection 1,000 mcg    7. Vitamin D deficiency  Stable  Continue Vitamin D supplementation  DISCUSSED AND ADVISED TO:  Foods that contain a lot of vitamin D includes [place], tuna, and mackerel. Cheese, egg yolks, and beef liver have small amounts of vit D.  Milk, soy drinks, orange juice, yogurt, margarine, and some kinds of cereal have vitamin D added to them. Continue to use sunblock when out in the sun to prevent skin cancer. 8. Opioid dependence on agonist therapy ([address] ( suboxone)  Stable  Encouraged to continue to follow-up with the specialty clinic  Continue to monitor      On this date 11/17/2022 I have spent 35 minutes reviewing previous notes, test results and face to face with the patient discussing the diagnosis and importance of compliance with the treatment plan as well as documenting on the day of the visit. Return in about 4 months (around 3/17/2023) for Chronic conditions, Appt w/ Dr. Vicente Watson ( 4 weeks virtual for Deppression for Cymbalta). This note was completed by using the assistance of a speech-recognition program. However, inadvertent computerized transcription errors may be present. Although every effort was made to ensure accuracy, no guarantees can be provided that every mistake has been identified and corrected by editing.   Electronically signed by TABITHA Valle CNP on 5/23/22 at 4:34 PM EDT     --TABITHA Valle CNP

## 2022-11-16 ENCOUNTER — OFFICE VISIT (OUTPATIENT)
Dept: PULMONOLOGY | Age: 42
End: 2022-11-16
Payer: COMMERCIAL

## 2022-11-16 VITALS
BODY MASS INDEX: 23.95 KG/M2 | HEIGHT: 68 IN | WEIGHT: 158 LBS | DIASTOLIC BLOOD PRESSURE: 88 MMHG | HEART RATE: 72 BPM | SYSTOLIC BLOOD PRESSURE: 137 MMHG | OXYGEN SATURATION: 98 %

## 2022-11-16 DIAGNOSIS — F11.20 OPIOID DEPENDENCE ON AGONIST THERAPY (HCC): ICD-10-CM

## 2022-11-16 DIAGNOSIS — J18.9 MULTIFOCAL PNEUMONIA: Primary | ICD-10-CM

## 2022-11-16 DIAGNOSIS — Z87.891 PERSONAL HISTORY OF TOBACCO USE: ICD-10-CM

## 2022-11-16 DIAGNOSIS — K21.9 GASTRO-ESOPHAGEAL REFLUX DISEASE WITHOUT ESOPHAGITIS: ICD-10-CM

## 2022-11-16 DIAGNOSIS — R09.82 POST-NASAL DRIP: ICD-10-CM

## 2022-11-16 PROCEDURE — 3074F SYST BP LT 130 MM HG: CPT | Performed by: INTERNAL MEDICINE

## 2022-11-16 PROCEDURE — G8427 DOCREV CUR MEDS BY ELIG CLIN: HCPCS | Performed by: INTERNAL MEDICINE

## 2022-11-16 PROCEDURE — G8420 CALC BMI NORM PARAMETERS: HCPCS | Performed by: INTERNAL MEDICINE

## 2022-11-16 PROCEDURE — G8484 FLU IMMUNIZE NO ADMIN: HCPCS | Performed by: INTERNAL MEDICINE

## 2022-11-16 PROCEDURE — 3078F DIAST BP <80 MM HG: CPT | Performed by: INTERNAL MEDICINE

## 2022-11-16 PROCEDURE — 1036F TOBACCO NON-USER: CPT | Performed by: INTERNAL MEDICINE

## 2022-11-16 PROCEDURE — 99213 OFFICE O/P EST LOW 20 MIN: CPT | Performed by: INTERNAL MEDICINE

## 2022-11-17 ENCOUNTER — OFFICE VISIT (OUTPATIENT)
Dept: FAMILY MEDICINE CLINIC | Age: 42
End: 2022-11-17
Payer: COMMERCIAL

## 2022-11-17 ENCOUNTER — HOSPITAL ENCOUNTER (OUTPATIENT)
Dept: PHYSICAL THERAPY | Age: 42
Setting detail: THERAPIES SERIES
Discharge: HOME | End: 2022-11-17
Payer: COMMERCIAL

## 2022-11-17 VITALS
BODY MASS INDEX: 23.79 KG/M2 | HEART RATE: 78 BPM | SYSTOLIC BLOOD PRESSURE: 126 MMHG | WEIGHT: 157 LBS | HEIGHT: 68 IN | TEMPERATURE: 97.5 F | OXYGEN SATURATION: 98 % | DIASTOLIC BLOOD PRESSURE: 80 MMHG

## 2022-11-17 DIAGNOSIS — M54.2 CHRONIC NECK PAIN: ICD-10-CM

## 2022-11-17 DIAGNOSIS — E53.8 VITAMIN B12 DEFICIENCY: ICD-10-CM

## 2022-11-17 DIAGNOSIS — I10 ESSENTIAL HYPERTENSION: Primary | ICD-10-CM

## 2022-11-17 DIAGNOSIS — G89.29 CHRONIC NECK PAIN: ICD-10-CM

## 2022-11-17 DIAGNOSIS — F43.23 ADJUSTMENT REACTION WITH ANXIETY AND DEPRESSION: ICD-10-CM

## 2022-11-17 DIAGNOSIS — F11.20 OPIOID DEPENDENCE ON AGONIST THERAPY (HCC): ICD-10-CM

## 2022-11-17 DIAGNOSIS — M54.41 CHRONIC MIDLINE LOW BACK PAIN WITH RIGHT-SIDED SCIATICA: ICD-10-CM

## 2022-11-17 DIAGNOSIS — G89.29 CHRONIC MIDLINE LOW BACK PAIN WITH RIGHT-SIDED SCIATICA: ICD-10-CM

## 2022-11-17 DIAGNOSIS — E46 PROTEIN MALNUTRITION (HCC): ICD-10-CM

## 2022-11-17 DIAGNOSIS — E55.9 VITAMIN D DEFICIENCY: ICD-10-CM

## 2022-11-17 PROCEDURE — 3078F DIAST BP <80 MM HG: CPT | Performed by: FAMILY MEDICINE

## 2022-11-17 PROCEDURE — G8420 CALC BMI NORM PARAMETERS: HCPCS | Performed by: FAMILY MEDICINE

## 2022-11-17 PROCEDURE — 3074F SYST BP LT 130 MM HG: CPT | Performed by: FAMILY MEDICINE

## 2022-11-17 PROCEDURE — 99214 OFFICE O/P EST MOD 30 MIN: CPT | Performed by: FAMILY MEDICINE

## 2022-11-17 PROCEDURE — 97110 THERAPEUTIC EXERCISES: CPT

## 2022-11-17 PROCEDURE — 97162 PT EVAL MOD COMPLEX 30 MIN: CPT

## 2022-11-17 PROCEDURE — G8484 FLU IMMUNIZE NO ADMIN: HCPCS | Performed by: FAMILY MEDICINE

## 2022-11-17 PROCEDURE — G8427 DOCREV CUR MEDS BY ELIG CLIN: HCPCS | Performed by: FAMILY MEDICINE

## 2022-11-17 PROCEDURE — 1036F TOBACCO NON-USER: CPT | Performed by: FAMILY MEDICINE

## 2022-11-17 PROCEDURE — 96372 THER/PROPH/DIAG INJ SC/IM: CPT | Performed by: FAMILY MEDICINE

## 2022-11-17 RX ORDER — DULOXETIN HYDROCHLORIDE 30 MG/1
30 CAPSULE, DELAYED RELEASE ORAL DAILY
Qty: 30 CAPSULE | Refills: 1 | Status: SHIPPED | OUTPATIENT
Start: 2022-11-17

## 2022-11-17 RX ORDER — CYANOCOBALAMIN 1000 UG/ML
1000 INJECTION, SOLUTION INTRAMUSCULAR; SUBCUTANEOUS ONCE
Status: COMPLETED | OUTPATIENT
Start: 2022-11-17 | End: 2022-11-17

## 2022-11-17 RX ADMIN — CYANOCOBALAMIN 1000 MCG: 1000 INJECTION, SOLUTION INTRAMUSCULAR; SUBCUTANEOUS at 11:08

## 2022-11-17 ASSESSMENT — PATIENT HEALTH QUESTIONNAIRE - PHQ9
2. FEELING DOWN, DEPRESSED OR HOPELESS: 1
4. FEELING TIRED OR HAVING LITTLE ENERGY: 0
3. TROUBLE FALLING OR STAYING ASLEEP: 1
6. FEELING BAD ABOUT YOURSELF - OR THAT YOU ARE A FAILURE OR HAVE LET YOURSELF OR YOUR FAMILY DOWN: 0
9. THOUGHTS THAT YOU WOULD BE BETTER OFF DEAD, OR OF HURTING YOURSELF: 0
SUM OF ALL RESPONSES TO PHQ QUESTIONS 1-9: 2
10. IF YOU CHECKED OFF ANY PROBLEMS, HOW DIFFICULT HAVE THESE PROBLEMS MADE IT FOR YOU TO DO YOUR WORK, TAKE CARE OF THINGS AT HOME, OR GET ALONG WITH OTHER PEOPLE: 0
1. LITTLE INTEREST OR PLEASURE IN DOING THINGS: 0
7. TROUBLE CONCENTRATING ON THINGS, SUCH AS READING THE NEWSPAPER OR WATCHING TELEVISION: 0
SUM OF ALL RESPONSES TO PHQ9 QUESTIONS 1 & 2: 1
SUM OF ALL RESPONSES TO PHQ QUESTIONS 1-9: 2
8. MOVING OR SPEAKING SO SLOWLY THAT OTHER PEOPLE COULD HAVE NOTICED. OR THE OPPOSITE, BEING SO FIGETY OR RESTLESS THAT YOU HAVE BEEN MOVING AROUND A LOT MORE THAN USUAL: 0
5. POOR APPETITE OR OVEREATING: 0

## 2022-11-17 NOTE — PRE-CERTIFICATION NOTE
..       [x] [address]  [address]  [phone]  F: [phone] [] [address] [facility] [address]   [address]  P: [phone]  F: [phone] [] [place]  [address] [facility]  P: [phone]  F: [phone] [] [address]  [facility]   [place]: [phone]  F: [phone]  [] [address]   Outpatient Occupational Therapy  [address] [facility]: [phone]  F: [phone]          Therapy Pre-certification Note      11/17/2022    Marciano Dandy  1980   [id number]      Insurance approval was received for Physical Therapy from Shannan Adler on 11/17/22. Approval was received for 40 visits, from 11/17/22 to 2/28/23. Authorization number [id number]. CPT codes approved: 01209; 31897; 62647; 87192; 68775 x160 units and 97014 x40 units    Patient was contacted to be scheduled and been scheduled for a follow-up visit.       Electronically signed by Justo Préez on 11/17/2022 at 12:22 PM

## 2022-11-17 NOTE — PATIENT INSTRUCTIONS
New Updates for [id number]/ [employer] [facility] [facility] [place]    Thank you for choosing US to give you the best care! [employer] ([place] [facility] is always trying to think of new ways to help their patients. We are asking all patients to try out the new digital registration that is now available through your [facility] account or the new [place], [employer] ([place] [facility] Via the mason you're now able to update your personal and registration information prior to your upcoming appointment. This will save you time once you arrive at the office to check-in, not to mention your information remains safe!! Many other perks come from signing up for an account, such as:  Requesting refills  Scheduling an appointment  Completing an E-Visit  Sending a message to the office/provider  Having access to your medication list  Paying your bill/copay prior to your appointment  Scheduling your yearly mammogram  Review your test results    If you are not familiar with [facility] or the [employer] [facility] [facility] [place], please ask one of us and we will be happy to answer any questions or help you set-up your account.       Your [address] office,  [place]

## 2022-11-17 NOTE — PROGRESS NOTES
Visit Information    Have you changed or started any medications since your last visit including any over-the-counter medicines, vitamins, or herbal medicines? no   Are you having any side effects from any of your medications? -  no  Have you stopped taking any of your medications? Is so, why? -  no    Have you seen any other physician or provider since your last visit? No  Have you had any other diagnostic tests since your last visit? No  Have you been seen in the emergency room and/or had an admission to a hospital since we last saw you? No  Have you had your routine dental cleaning in the past 6 months? yes     Have you activated your [facility] account? If not, what are your barriers?  Yes     Patient Care Team:  TABITHA Hopson CNP as PCP - General (Family Medicine)  TABITHA Hopson CNP as PCP - [facility]  Sal Levin MD (Obstetrics & Gynecology)  Srinath Fofana MD as Consulting Physician (Pulmonary Disease)  TABITHA Thomas CNP as Nurse Practitioner (Nurse Practitioner Family)    Medical History Review  Past Medical, Family, and Social History reviewed and does contribute to the patient presenting condition    Health Maintenance   Topic Date Due    DTaP/Tdap/Td vaccine (1 - Tdap) Never done    Breast cancer screen  05/09/2023    Depression Monitoring  10/12/2023    Cervical cancer screen  02/19/2026    Lipids  03/21/2027    Flu vaccine  Completed    COVID-19 Vaccine  Completed    Hepatitis C screen  Completed    HIV screen  Completed    Hepatitis A vaccine  Aged Out    Hib vaccine  Aged Out    Meningococcal (ACWY) vaccine  Aged Out    Pneumococcal 0-64 years Vaccine  Aged Out    Varicella vaccine  Discontinued
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
## 2022-11-21 DIAGNOSIS — J18.9 MULTIFOCAL PNEUMONIA: ICD-10-CM

## 2022-11-21 NOTE — PROGRESS NOTES
PULMONARY OP  PROGRESS NOTE      Patient:  Gabriel Cross  YOB: 1980    MRN: [id number]     Acct:        Pt seen and Chart reviewed. Gabriel Cross is here in followup for   1. Multifocal pneumonia    2. Post-nasal drip    3. Opioid dependence on agonist therapy ([facility] [address]    4. Gastro-esophageal reflux disease without esophagitis    5. Personal history of tobacco use          Pt  has not been hospitalizedor been to er since last visit. Has been using meds as recommended. Denied any fever, chills  Has a good appetite  No weight loss  Not much cough or sputum production  No hemoptysis  No acid reflux symptoms or postnasal discharge currently    Subjective:  Review of Systems    Review of Systems -   General ROS: Completed and except as mentioned above were negative   Psychological ROS:  Completed and except as mentioned above were negative  Ophthalmic ROS:  Completed and except as mentioned above were negative  ENT ROS:  Completed and except as mentioned above were negative  Allergy and Immunology ROS:  Completed and except as mentioned above werenegative  Hematological and Lymphatic ROS:  Completed and except as mentioned above were negative  Endocrine ROS: Completed and except as mentioned above were negative  Respiratory ROS:  Completed and except asmentioned above were negative  Cardiovascular ROS:  Completed and except as mentioned above were negative  Gastrointestinal ROS: Completed and except as mentioned above were negative  Genito-Urinary ROS:  Completedand except as mentioned above were negative  Musculoskeletal ROS:  Completed and except as mentioned above were negative  Neurological ROS:  Completed and except as mentioned above were negative  Dermatological ROS:Completed and except as mentioned above were negative    SLEEP  No epistaxis or sore throat. does not have fatigue, tiredness or sleepiness in am.    No MVA. No edema. Allergies:   Allergies   Allergen Reactions    Other seasonal       Medications:    Current Outpatient Medications:     gabapentin (NEURONTIN) 100 MG capsule, TAKE 1 CAPSULE BY MOUTH EVERY MORNING, 1 CAPSULE AT NOON AND 1 CAPSULE BEFORE BEDTIME, Disp: 90 capsule, Rfl: 0    lisinopril (PRINIVIL;ZESTRIL) 10 MG tablet, Take 1 tablet by mouth daily Dose increased 10/13/2022. Goal BP bellow 135/85 and above 115/65, heart rate 56 to 90 bpm, Disp: 90 tablet, Rfl: 3    albuterol sulfate HFA (PROVENTIL HFA) 108 (90 Base) MCG/ACT inhaler, Inhale 2 puffs into the lungs every 6 hours as needed for Wheezing or Shortness of Breath Okay to substitute, Disp: 8 g, Rfl: 0    tiZANidine (ZANAFLEX) 2 MG tablet, TAKE 1 TO 2 TABLETS BY MOUTH AT NIGHT, Disp: 60 tablet, Rfl: 2    fluticasone (FLONASE) 50 MCG/ACT nasal spray, INSTILL 1 SPRAY BY EACH NOSTRIL ROUTE DAILY, Disp: 16 g, Rfl: 2    naloxone 4 MG/0.1ML LIQD nasal spray, 1 spray by Nasal route as needed, Disp: , Rfl:     Prenatal Vit-Fe Fumarate-FA (PRENATAL VITAMINS) 28-0.8 MG TABS, Take 1 tablet by mouth daily, Disp: 90 tablet, Rfl: 2    buprenorphine-naloxone (SUBOXONE) 8-2 MG FILM SL film, , Disp: , Rfl:     DULoxetine (CYMBALTA) 30 MG extended release capsule, Take 1 capsule by mouth daily, Disp: 30 capsule, Rfl: 1    Handicap Placard MISC, by Does not apply route Expires in 5 years 11/17/2027, Disp: 1 each, Rfl: 0    vitamin D (CHOLECALCIFEROL) 25 MCG (1000 UT) TABS tablet, Take 1 tablet by mouth daily, Disp: 90 tablet, Rfl: 5      Objective:    Physical Exam:  Vitals: /88 (Site: Left Upper Arm, Position: Sitting, Cuff Size: Medium Adult)   Pulse 72   Ht 5' 8\" (1.727 m)   Wt 158 lb (71.7 kg)   LMP 10/17/2022 (Approximate)   SpO2 98%   BMI 24.02 kg/m²   Last 3 weights: Wt Readings from Last 3 Encounters:   11/17/22 157 lb (71.2 kg)   11/16/22 158 lb (71.7 kg)   11/08/22 157 lb (71.2 kg)     Body mass index is 24.02 kg/m².   Physical Examination:   PHYSICAL EXAMINATION:    Vitals:    11/16/22 0947   BP: 137/88   Site: Left Upper Arm   Position: Sitting   Cuff Size: Medium Adult   Pulse: 72   SpO2: 98%   Weight: 158 lb (71.7 kg)   Height: 5' 8\" (1.727 m)       Constitutional: This is a well developed, well nourished, 18.5-24.9 - Normal 43y.o. year old female who is alert, oriented, cooperative and in no apparent distress. Head:normocephalic and atraumatic. EENT:  SARMAD. No conjunctival injections. Septum was midline, mucosa was without erythema, exudates or cobblestoning. No thrush was noted. MallampatiII (soft palate, uvula, fauces visible)  Neck: Supple without thyromegaly. No elevated JVP. Trachea was midline. Respiratory: Chest was symmetrical without dullness to percussion. Breath sounds bilaterally were clear to auscultation. There were no wheezes, rhonchi or rales. There is no intercostal retraction or use of accessory muscles. No egophony noted. Cardiovascular: Regular without murmur, clicks, gallops or rubs. Abdomen: Slightly rounded and soft without organomegaly. No rebound, rigidity or guarding was appreciated. Lymphatic: No lymphadenopathy. Musculoskeletal: Normal curvature of the spine. No gross muscle weakness. Extremities:  does not have lower extremity edema,  no ulcerations, tenderness, varicosities or erythema. Muscle size, tone and strength are normal.  No involuntary movements are noted. Skin:  Warm and dry. Good color, turgor and pigmentation. No lesions or scars. No cyanosis or clubbing  Neurological/Psychiatric: The patient's general behavior, level of consciousness, thought content and emotional status is normal.          DATA:     Pulmonary function tests: none        CXR: REVIEWED: In November 2022 showed-    Unremarkable PA and lateral chest.  No evidence of acute cardiopulmonary   process. IMPRESSION:   1. Multifocal pneumonia    2. Post-nasal drip    3. Opioid dependence on agonist therapy ([address]    4. Gastro-esophageal reflux disease without esophagitis    5. Personal history of tobacco use                   PLAN:     Reviewed the chest x-ray  Refills were provided -none  Recommend flu vaccination in the fall annually. Recommendations given regarding pneumococcal vaccinations. Patient had the COVID-19 vaccination including the omicron booster  Patient is uptodate with vaccinations from pulmonary perspective. Maintain an active lifestyle. continue smoking cessation. All the questions that the patient has had were answered to   her satisfaction. Home O2 evaluation was done. Supplemental oxygen was not indicated  After reviewing the patient's smoking history and her age patient does not meet the criteria for lung cancer screening. We'll see the patient back as needed. Patient will call us if she is sick, so she can be seen sooner. Thank you for having us involved in the care of your patient. Please call us if you have any questions or concerns.           Ivette Aguilar MD, MD             11/20/2022, 8:54 PM

## 2022-11-22 ENCOUNTER — HOSPITAL ENCOUNTER (OUTPATIENT)
Dept: PHYSICAL THERAPY | Age: 42
Setting detail: THERAPIES SERIES
Discharge: HOME | End: 2022-11-22
Payer: COMMERCIAL

## 2022-11-22 RX ORDER — ALBUTEROL SULFATE 90 UG/1
2 AEROSOL, METERED RESPIRATORY (INHALATION) EVERY 6 HOURS PRN
Qty: 8 G | Refills: 1 | Status: SHIPPED | OUTPATIENT
Start: 2022-11-22

## 2022-11-22 NOTE — FLOWSHEET NOTE
[x] [facility] [address]  [address]    [phone]  F: [phone]     Physical Therapy Cancel/No Show note    Date: 2022  Patient: Marguerite Boogie  : 1980  MRN: [id number]    Cancels/No Shows to date:     For today's appointment patient:    [x]  Cancelled    [x] Rescheduled appointment    [] No-show     Reason given by patient:    []  Patient ill    []  Conflicting appointment    [] No transportation      [] Conflict with work    [x] No reason given    [] Weather related    [] [id number]    [] Other:      Comments:        [x] Next appointment was confirmed rescheduled for 22 at 161 [id number]    Electronically signed by: Marina Moy PTA

## 2022-11-30 DIAGNOSIS — E46 PROTEIN MALNUTRITION (HCC): ICD-10-CM

## 2022-11-30 RX ORDER — PNV NO.95/FERROUS FUM/FOLIC AC 28MG-0.8MG
1 TABLET ORAL DAILY
Qty: 30 TABLET | Refills: 1 | Status: SHIPPED | OUTPATIENT
Start: 2022-11-30

## 2022-12-05 ENCOUNTER — HOSPITAL ENCOUNTER (OUTPATIENT)
Dept: PHYSICAL THERAPY | Age: 42
Setting detail: THERAPIES SERIES
Discharge: HOME | End: 2022-12-05
Payer: COMMERCIAL

## 2022-12-05 PROCEDURE — 97110 THERAPEUTIC EXERCISES: CPT

## 2022-12-05 NOTE — FLOWSHEET NOTE
[x] [facility]  [address]  [phone]  F: [phone]     Physical Therapy Daily Treatment Note    Date:  2022  Patient Name:  Zheng Garcia      :  1980    MRN: [id number]  Physician:  TABITHA Martinez - ANATOLY                                     Insurance: [address] [place] [address]   Approval was received for 40 visits, from 22 to 23. Authorization number [id number]. CPT codes approved: 98821; 32022; 90919; 22029; 50970 x160 units and 97014 x40 units  Medical Diagnosis: Chronic midline low back pain with right-sided sciatica M54.41,G89.29; Chronic neck pain M54.2,G89.29; Lumbar radiculopathy, chronic M54.16; Degeneration of cervical intervertebral disc M50.30                          Rehab Codes: M54.2, M54.59, M79.601, M79.604, R29.3, R29.6, M62.591  Onset Date: 2020                     Next 's appt.: 22 Pain Mgmt    Visit# / total visits:    Cancels/No Shows:     Subjective:    Pain:  [x] Yes  [] No Location: midline Low Back   Pain Rating: (0-10 scale) 6/10  Pain altered Tx:  [x] No  [] Yes  Action:  Comments: Patient reports painful stiffness in her back currently. States that morning and evenings are generally worse for her due to the stiffness/tightness.      Objective:  Modalities:   Precautions: Begin with flexion/neutral spine based program  Exercises:  Exercise Reps/ Time Weight/ Level Comments   Seated      Hamstring Stretch on Step Stool 3x20\" ea     Trunk Flexion Stretch 5x10\"     Trunk Rotation Stretch 5x10\" ea     Cervical Rotation 10x3\"     UT Stretch 3x20\" ea           Supine      LTR 10x3\"     DKTC 2x10     Piriformis Stretch 3x20\"     Pelvit Tilts 15x     Alt March w/PPT 15x ea     SLR 15x ea           Sidelying      Hip Abduction 15x     Clamshell 15x [place]                Other:      Treatment Charges: Mins Units   []  Modalities     [x]  Ther Exercise 39 3   []  Manual Therapy     []  Ther Activities     []  Aquatics     []  Vasocompression     []  Other     Total Treatment time 39 3       Assessment: [x] Progressing toward goals. Evident core weakness demonstrated throughout completion of charted therex. Patient reports feeling \"looser and more relaxed\" following stretches and basic ex's noted. [] No change. [] Other:  [x] Patient would benefit from skilled physical therapy services in order to: decrease pain neck and back, increase cerv & lumbar ROM, increase strength R UE, R LE, spinal stab muscles, decrease risk of falls, and improve sitting, bending lifting, driving, household activities and carrying her children. STG: (to be met in 10 treatments)  ? Pain: Cerv, LB 6/10 average pain, 8/10 at worst   ? ROM: Cerv Flex 50°, R Rot 50°, SB R 35°; Lumbar ext 10°, flex 60° to improve sitting  ? Strength: R shoulder 3+/5, R elbow 4/5,  34 lbs, R hip 3+/5, R knee 4-/5, R DF 3+/5, spinal stab muscles as evidenced by ability to perform beginning spinal stab ex program to improve bed mobility, transfers, and walking  ? Function: Oswestry 60% loss of function, Neck Disability 60% loss of function   Pt report improved tolerance to sitting, reaching up, and standing  Patient to be independent with home exercise program as demonstrated by performance with correct form without cues. Demonstrate Knowledge of fall prevention    LTG: (to be met in 20 treatments)  ? Pain: Cerv, LB 3/10 average pain, 5/10 at worst   ? ROM: Cerv AROM WNL w/out increased pain; Lumbar ext 20°, flex AROM WNL w/out increased pain, to improve driving and bending  ? Strength: R shoulder 4-/5, R elbow 4+/5,  42 lbs, B hips 4-/5, R knee 4/5, R DF 4-/5, spinal stab muscles as evidenced by ability to perform intermediate spinal stab ex program to improve household activities and carrying her children   ?  Function: Oswestry 42% loss of function, Neck Disability 42% loss of function   Pt report improved tolerance to bending, lifting and carrying her children        Patient goals: \"Root of problem, MRI\"      Pt. Education:  [x] Yes  [] No  [] Reviewed Prior HEP/Ed  Method of Education: [x] Verbal  [x] Demo  [x] Written  Comprehension of Education:  [x] Verbalizes understanding. [] Demonstrates understanding. [x] Needs review. [] Demonstrates/verbalizes HEP/Ed previously given. Date: 12/05/2022  Access Code: [email or web address]  URL: [facility]/  Prepared by: Yadiel Rasmussen  Exercises  Seated Hamstring Stretch - 2-3 x daily - 7 x weekly - 1 sets - 3 reps - 30 hold  Seated Trunk Rotation Stretch - 2 x daily - 7 x weekly - 1 sets - 5 reps - 10 hold  Seated Lumbar Flexion Stretch - 2 x daily - 7 x weekly - 1 sets - 10 reps - 10 hold  Seated Cervical Rotation AROM - 1 x daily - 7 x weekly - 3 sets - 10 reps  Seated Upper Trapezius Stretch - 3 x daily - 7 x weekly - 1 sets - 3 reps - 30 hold  Supine Lower Trunk Rotation - 1 x daily - 7 x weekly - 3 sets - 10 reps  Supine Posterior Pelvic Tilt - 1 x daily - 7 x weekly - 3 sets - 10 reps  Supine March with Posterior Pelvic Tilt - 1 x daily - 7 x weekly - 3 sets - 10 reps  Straight Leg Raise - 1 x daily - 7 x weekly - 3 sets - 10 reps  Sidelying Hip Abduction - 1 x daily - 7 x weekly - 3 sets - 10 reps  Clamshell - 1 x daily - 7 x weekly - 3 sets - 10 reps       Plan: [x] Continue current frequency toward long and short term goals.      [x] Frequency:  2 x/week for 20 visits  [x] Specific Instructions for subsequent treatments: progress spinal stab ex, cerv ex per Pt tolerance, postural ed, corner stretch, scap retractions, supine cerv retractions, trial manual/mechanical cerv traction, consider HP ES neck and back to decrease pain and tightness         Time In: 1015           Time Out: 1100      Electronically signed by:  Yadiel Rasmussen PTA

## 2022-12-07 ENCOUNTER — HOSPITAL ENCOUNTER (OUTPATIENT)
Dept: PHYSICAL THERAPY | Age: 42
Setting detail: THERAPIES SERIES
Discharge: HOME | End: 2022-12-07
Payer: COMMERCIAL

## 2022-12-07 DIAGNOSIS — M54.41 CHRONIC MIDLINE LOW BACK PAIN WITH RIGHT-SIDED SCIATICA: ICD-10-CM

## 2022-12-07 DIAGNOSIS — M54.2 CHRONIC NECK PAIN: ICD-10-CM

## 2022-12-07 DIAGNOSIS — G89.29 CHRONIC NECK PAIN: ICD-10-CM

## 2022-12-07 DIAGNOSIS — G89.29 CHRONIC MIDLINE LOW BACK PAIN WITH RIGHT-SIDED SCIATICA: ICD-10-CM

## 2022-12-07 PROCEDURE — 97110 THERAPEUTIC EXERCISES: CPT

## 2022-12-07 PROCEDURE — 97140 MANUAL THERAPY 1/> REGIONS: CPT

## 2022-12-07 RX ORDER — GABAPENTIN 100 MG/1
100 CAPSULE ORAL 3 TIMES DAILY
Qty: 90 CAPSULE | Refills: 0 | Status: SHIPPED | OUTPATIENT
Start: 2022-12-07 | End: 2023-01-06

## 2022-12-07 NOTE — FLOWSHEET NOTE
[x] [facility]  [address]  [phone]  F: [phone]     Physical Therapy Daily Treatment Note    Date:  2022  Patient Name:  Brenden Mccracken      :  1980    MRN: [id number]  Physician:  Brooke Sawyer, APRN - CNP                                     Insurance: [employer]   Approval was received for 40 visits, from 22 to 23. Authorization number [id number]. CPT codes approved: 84031; 92550; 22902; 61424; 74646 x160 units and 97014 x40 units  Medical Diagnosis: Chronic midline low back pain with right-sided sciatica M54.41,G89.29; Chronic neck pain M54.2,G89.29; Lumbar radiculopathy, chronic M54.16; Degeneration of cervical intervertebral disc M50.30                          Rehab Codes: M54.2, M54.59, M79.601, M79.604, R29.3, R29.6, M62.591  Onset Date: 2020                     Next 's appt.: 22 Pain Mgmt    Visit# / total visits: 3/20   Cancels/No Shows:     Subjective:    Pain:  [x] Yes  [] No Location: midline Low Back   Pain Rating: (0-10 scale) 6/10  Pain altered Tx:  [x] No  [] Yes  Action:  Comments: Patient reports good tolerance to previous Tx. Noted some relief for a couple hours post Tx, but then noticed her pain started to return. Reports that pain is currently unchanged since previous Tx.      Objective:  Modalities:   Precautions: Begin with flexion/neutral spine based program  Exercises:  Exercise Reps/ Time Weight/ Level Comments   NuStep 6 min Level 4          Seated      Hamstring Stretch on Step Stool 3x20\" ea     Trunk Flexion Stretch 5x10\"     Trunk Rotation Stretch 5x10\" ea     Cervical Rotation 10x3\"     UT Stretch 3x20\" ea           Supine      LTR 10x3\"     DKTC 2x10     Piriformis Stretch 3x20\"     Pelvit Tilts 15x     Alt March w/PPT 15x ea     Alt UE/LE w/PPT 15x ea 2 lb UE Added 12/7/22   SLR 15x ea     Heel Walkouts 10x  Added 22         Sidelying      Hip Abduction 15x [place] 15x [place]                Other:    Manual: cervical STM B paraspinals, SOR, traction and passive stretching x 10 minutes (limited extension noted due to tight scalenes/SCM)    Treatment Charges: Mins Units   []  Modalities     [x]  Ther Exercise 43 3   []  Manual Therapy 10 1   []  Ther Activities     []  Aquatics     []  Vasocompression     []  Other     Total Treatment time 53 4       Assessment: [x] Progressing toward goals. Alt alt UE/LE and heel walkouts to progress DLS program for improve core control. Patient again notes low back feels looser post stretches and charted therex. Added manual to cervical spine to improve soft tissue and cervical spine mobility. Good tolerance with all, with patient noting pain decreased to 4/10 post Tx. NuStep partially billed during whilst collecting subjective information. [] No change. [] Other:  [x] Patient would benefit from skilled physical therapy services in order to: decrease pain neck and back, increase cerv & lumbar ROM, increase strength R UE, R LE, spinal stab muscles, decrease risk of falls, and improve sitting, bending lifting, driving, household activities and carrying her children. STG: (to be met in 10 treatments)  ? Pain: Cerv, LB 6/10 average pain, 8/10 at worst   ? ROM: Cerv Flex 50°, R Rot 50°, SB R 35°; Lumbar ext 10°, flex 60° to improve sitting  ? Strength: R shoulder 3+/5, R elbow 4/5,  34 lbs, R hip 3+/5, R knee 4-/5, R DF 3+/5, spinal stab muscles as evidenced by ability to perform beginning spinal stab ex program to improve bed mobility, transfers, and walking  ? Function: Oswestry 60% loss of function, Neck Disability 60% loss of function   Pt report improved tolerance to sitting, reaching up, and standing  Patient to be independent with home exercise program as demonstrated by performance with correct form without cues. Demonstrate Knowledge of fall prevention    LTG: (to be met in 20 treatments)  ?  Pain: Cerv, LB 3/10 average pain, 5/10 at worst   ? ROM: Cerv AROM WNL w/out increased pain; Lumbar ext 20°, flex AROM WNL w/out increased pain, to improve driving and bending  ? Strength: R shoulder 4-/5, R elbow 4+/5,  42 lbs, B hips 4-/5, R knee 4/5, R DF 4-/5, spinal stab muscles as evidenced by ability to perform intermediate spinal stab ex program to improve household activities and carrying her children   ? Function: Oswestry 42% loss of function, Neck Disability 42% loss of function   Pt report improved tolerance to bending, lifting and carrying her children        Patient goals: \"Root of problem, MRI\"      Pt. Education:  [x] Yes  [] No  [x] Reviewed Prior HEP/Ed  Method of Education: [x] Verbal  [x] Demo  [] Written  Comprehension of Education:  [x] Verbalizes understanding. [] Demonstrates understanding. [x] Needs review. [] Demonstrates/verbalizes HEP/Ed previously given. Date: 12/05/2022  Access Code: [email or web address]  URL: [email or web address]  Prepared by: Joshua Patricio  Exercises  Seated Hamstring Stretch - 2-3 x daily - 7 x weekly - 1 sets - 3 reps - 30 hold  Seated Trunk Rotation Stretch - 2 x daily - 7 x weekly - 1 sets - 5 reps - 10 hold  Seated Lumbar Flexion Stretch - 2 x daily - 7 x weekly - 1 sets - 10 reps - 10 hold  Seated Cervical Rotation AROM - 1 x daily - 7 x weekly - 3 sets - 10 reps  Seated Upper Trapezius Stretch - 3 x daily - 7 x weekly - 1 sets - 3 reps - 30 hold  Supine Lower Trunk Rotation - 1 x daily - 7 x weekly - 3 sets - 10 reps  Supine Posterior Pelvic Tilt - 1 x daily - 7 x weekly - 3 sets - 10 reps  Supine March with Posterior Pelvic Tilt - 1 x daily - 7 x weekly - 3 sets - 10 reps  Straight Leg Raise - 1 x daily - 7 x weekly - 3 sets - 10 reps  Sidelying Hip Abduction - 1 x daily - 7 x weekly - 3 sets - 10 reps  Clamshell - 1 x daily - 7 x weekly - 3 sets - 10 reps       Plan: [x] Continue current frequency toward long and short term goals.      [x] Frequency:  2 x/week for 20 visits  [x] Specific Instructions for subsequent treatments: progress spinal stab ex, cerv ex per Pt tolerance, postural ed, corner stretch, scap retractions, supine cerv retractions, trial manual/mechanical cerv traction, consider HP ES neck and back to decrease pain and tightness         Time In: 1005          Time Out: 1102      Electronically signed by:  Chanel Mccallum PTA

## 2022-12-13 ENCOUNTER — HOSPITAL ENCOUNTER (OUTPATIENT)
Dept: PAIN MANAGEMENT | Age: 42
Discharge: HOME | End: 2022-12-13
Payer: COMMERCIAL

## 2022-12-13 VITALS
HEIGHT: 68 IN | TEMPERATURE: 97.3 F | BODY MASS INDEX: 23.79 KG/M2 | DIASTOLIC BLOOD PRESSURE: 98 MMHG | SYSTOLIC BLOOD PRESSURE: 149 MMHG | WEIGHT: 157 LBS | OXYGEN SATURATION: 100 % | HEART RATE: 68 BPM | RESPIRATION RATE: 18 BRPM

## 2022-12-13 DIAGNOSIS — G89.29 CHRONIC NECK PAIN: ICD-10-CM

## 2022-12-13 DIAGNOSIS — M54.16 LUMBAR RADICULOPATHY, CHRONIC: Primary | ICD-10-CM

## 2022-12-13 DIAGNOSIS — G89.29 CHRONIC MIDLINE LOW BACK PAIN WITH RIGHT-SIDED SCIATICA: ICD-10-CM

## 2022-12-13 DIAGNOSIS — M54.41 CHRONIC MIDLINE LOW BACK PAIN WITH RIGHT-SIDED SCIATICA: ICD-10-CM

## 2022-12-13 DIAGNOSIS — M54.2 CHRONIC NECK PAIN: ICD-10-CM

## 2022-12-13 PROCEDURE — 99213 OFFICE O/P EST LOW 20 MIN: CPT

## 2022-12-13 PROCEDURE — 99214 OFFICE O/P EST MOD 30 MIN: CPT | Performed by: NURSE PRACTITIONER

## 2022-12-13 ASSESSMENT — ENCOUNTER SYMPTOMS
BACK PAIN: 1
WHEEZING: 0
RESPIRATORY NEGATIVE: 1
SHORTNESS OF BREATH: 0
CONSTIPATION: 0
COUGH: 0
BOWEL INCONTINENCE: 0
ABDOMINAL PAIN: 0
SHORTNESS OF BREATH: 0

## 2022-12-13 ASSESSMENT — PAIN SCALES - GENERAL: PAINLEVEL_OUTOF10: 7

## 2022-12-13 NOTE — PROGRESS NOTES
Chief Complaint   Patient presents with    Back Pain    Neck Pain         [facility]     40-year-old female with history of chronic neck and back pain with no known injury or surgery to the areas   Onset of symptom more than 10 years ago - was treated at a different clinic with injections that were helpful and is interested in repeating  Reports neck pain with radiation down right arm associated with right arm numbness and tingling  Also c/o pain located in the right lower lumbar area with radiation down right leg all the way to the foot associated with intermittent right leg numbness and tingling  No changes in bladder or bowel control  No history fever chills or weight loss  No previous MRI lumbar spine or cervical spine  Pt has completed 3 visits physical therapy with no change in pain  Currently taking gabapentin and Zanaflex from PCP      HPI:     Back Pain  This is a chronic problem. The current episode started more than 1 year ago. The problem occurs constantly. The problem is unchanged. The pain is present in the lumbar spine. The quality of the pain is described as aching, burning, cramping, shooting and stabbing. The pain radiates to the right knee, right foot and right thigh. The pain is at a severity of 7/10. The pain is The same all the time. The symptoms are aggravated by bending, sitting and standing. Associated symptoms include numbness, tingling and weakness. Pertinent negatives include no bladder incontinence, bowel incontinence, chest pain, fever or headaches. She has tried ice and heat (PT) for the symptoms. Neck Pain   This is a chronic problem. The current episode started more than 1 year ago. The problem occurs constantly. The problem has been unchanged. The pain is associated with nothing. The pain is present in the midline and right side. The quality of the pain is described as stabbing, shooting, cramping, burning and aching. The pain is at a severity of 6/10.  The symptoms are aggravated by bending, position and twisting. Associated symptoms include numbness, tingling and weakness. Pertinent negatives include no chest pain, fever or headaches. She has tried ice and heat (PT) for the symptoms. Past Medical History:   Diagnosis Date    Acute respiratory failure with hypoxia ([address] 9/30/2022    Advanced maternal age in multigravida 2/10/2021    Anxiety 2/13/2015    Chronic midline low back pain with right-sided sciatica 5/24/2022    Degeneration of cervical intervertebral disc 12/21/2020    Depression 12/5/2012    Dichorionic diamniotic twin pregnancy, antepartum 2/10/2021    Formatting of this note might be different from the original. Serial growth US  Growth US  - 5/3/60: A cephalic, anterior, eccentric cord insertion, INDERJIT lg pocket 4.14, EFW 340g (59%)  - B transverse head right,posterior, marginal cord insertion, INDERJIT lg pocket 4.05 cm, EFW 319g (39%)   - 5/6 : A cephalic, INDERJIT wnl, EFW 610T (95%)  - B cephalic, INDERJIT wnl, EFW 309P (38%)  [ ] next due 6/3    Essential hypertension 12/21/2020    Gastro-esophageal reflux disease without esophagitis 12/21/2020    Hx of preeclampsia, prior pregnancy, currently pregnant 2/10/2021    Formatting of this note might be different from the original.  4/2019  Delivered at 33 weeks gestation for preeclampsia with severe features.  2/19/2021  CMP Nml     Urine PC Ratio:  0.11    Lumbar radiculopathy, acute 2/27/2013    Maternal antithrombin III deficiency complicating pregnancy ([address] 3/8/2021    Formatting of this note might be different from the original. 77% (83- 1328%) - mild deficiency 2/2021 [ ] Has Hematology appt 5/13 (no showed)  High risk thrombophilia Lovenox therapy    Opioid use, unspecified with other opioid-induced disorder (HCC)(Suboxone) 12/21/2020    Pneumonia due to infectious organism 9/28/2022    Sepsis ([address] 9/28/2022       Past Surgical History:   Procedure Laterality Date    TUBAL LIGATION  01/27/2022       Allergies Allergen Reactions    Other      seasonal         Current Outpatient Medications:     gabapentin (NEURONTIN) 100 MG capsule, Take 1 capsule by mouth 3 times daily for 30 days. , Disp: 90 capsule, Rfl: 0    Prenatal Vit-Fe Fumarate-FA (PRENATAL VITAMINS) 28-0.8 MG TABS, TAKE 1 TABLET BY MOUTH DAILY, Disp: 30 tablet, Rfl: 1    albuterol sulfate HFA (PROVENTIL HFA) 108 (90 Base) MCG/ACT inhaler, Inhale 2 puffs into the lungs every 6 hours as needed for Wheezing or Shortness of Breath Okay to substitute, Disp: 8 g, Rfl: 1    DULoxetine (CYMBALTA) 30 MG extended release capsule, Take 1 capsule by mouth daily, Disp: 30 capsule, Rfl: 1    Handicap Placard MISC, by Does not apply route Expires in 5 years 11/17/2027, Disp: 1 each, Rfl: 0    lisinopril (PRINIVIL;ZESTRIL) 10 MG tablet, Take 1 tablet by mouth daily Dose increased 10/13/2022.  Goal BP bellow 135/85 and above 115/65, heart rate 56 to 90 bpm, Disp: 90 tablet, Rfl: 3    tiZANidine (ZANAFLEX) 2 MG tablet, TAKE 1 TO 2 TABLETS BY MOUTH AT NIGHT, Disp: 60 tablet, Rfl: 2    fluticasone (FLONASE) 50 MCG/ACT nasal spray, INSTILL 1 SPRAY BY EACH NOSTRIL ROUTE DAILY, Disp: 16 g, Rfl: 2    naloxone 4 MG/0.1ML LIQD nasal spray, 1 spray by Nasal route as needed, Disp: , Rfl:     vitamin D (CHOLECALCIFEROL) 25 MCG (1000 UT) TABS tablet, Take 1 tablet by mouth daily, Disp: 90 tablet, Rfl: 5    buprenorphine-naloxone (SUBOXONE) 8-2 MG FILM SL film, , Disp: , Rfl:     Family History   Problem Relation Age of Onset    Heart Disease Mother     Breast Cancer Mother 36    Diabetes Father     Breast Cancer Maternal Aunt 36       Social History     Socioeconomic History    Marital status: Single     Spouse name: Not on file    Number of children: Not on file    Years of education: Not on file    Highest education level: Not on file   Occupational History    Not on file   Tobacco Use    Smoking status: Former     Years: 10.00     Types: Cigarettes    Smokeless tobacco: Never    Tobacco comments:     1 pack every two days   Substance and Sexual Activity    Alcohol use: Not Currently     Comment: social    Drug use: No    Sexual activity: Not on file   Other Topics Concern    Not on file   Social History Narrative    Not on file     Social Determinants of Health     Financial Resource Strain: Low Risk     Difficulty of Paying Living Expenses: Not hard at all   Food Insecurity: No Food Insecurity    Worried About [address] [employer] in the Last Year: Never true    [address] in the Last Year: Never true   Transportation Needs: No Transportation Needs    Lack of Transportation (Medical): No    Lack of Transportation (Non-Medical): No   Physical Activity: Not on file   Stress: Not on file   Social Connections: Not on file   Intimate Partner Violence: Not on file   Housing Stability: Unknown    Unable to Pay for Housing in the Last Year: No    Number of Places Lived in the Last Year: Not on file    Unstable Housing in the Last Year: No       Review of Systems:  Review of Systems   Constitutional: Negative for chills and fever. Cardiovascular:  Negative for chest pain. Respiratory:  Negative for cough and shortness of breath. Musculoskeletal:  Positive for back pain and neck pain. Gastrointestinal:  Negative for bowel incontinence and constipation. Genitourinary:  Negative for bladder incontinence. Neurological:  Positive for numbness, tingling and weakness. Negative for headaches. Physical Exam:  BP (!) 149/98   Pulse 68   Temp 97.3 °F (36.3 °C)   Resp 18   Ht 5' 8\" (1.727 m)   Wt 157 lb (71.2 kg)   SpO2 100%   BMI 23.87 kg/m²     Physical Exam  Cardiovascular:      Rate and Rhythm: Normal rate. Pulmonary:      Effort: Pulmonary effort is normal.   Musculoskeletal:         General: Normal range of motion. Skin:     General: Skin is warm and dry. Neurological:      Mental Status: She is alert and oriented to person, place, and time.          Assessment:  Problem List Items Addressed This Visit       Chronic midline low back pain with right-sided sciatica    Relevant Orders    MRI CERVICAL SPINE WO CONTRAST    MRI LUMBAR SPINE WO CONTRAST    Chronic neck pain    Relevant Orders    MRI CERVICAL SPINE WO CONTRAST    Lumbar radiculopathy, chronic - Primary    Relevant Orders    MRI CERVICAL SPINE WO CONTRAST    MRI LUMBAR SPINE WO CONTRAST          Treatment Plan:    Cervical and Lumbar MRI ordered today to further evaluate pathology and guide treatment plan. Consider intervention and/or surgical consult based on findings  Consider EMG in future  Continued PT  Continue meds with PCP   Follow up after MRIs for options     I have reviewed the chief complaint and history of present illness (including ROS and PFSH) and vital documentation by my staff and I agree with their documentation and have added where applicable.

## 2022-12-14 DIAGNOSIS — J30.9 CHRONIC ALLERGIC RHINITIS: ICD-10-CM

## 2022-12-14 RX ORDER — FLUTICASONE PROPIONATE 50 MCG
SPRAY, SUSPENSION (ML) NASAL
Qty: 16 G | Refills: 1 | Status: SHIPPED | OUTPATIENT
Start: 2022-12-14 | End: 2023-01-24 | Stop reason: SDUPTHER

## 2022-12-14 NOTE — PROGRESS NOTES
CONSENT:  She and/or health care decision maker is aware that that she may receive a bill for this telephone service, depending on her insurance coverage, and has provided verbal consent to proceed: Yes    DOCUMENTATION:  Patient scheduled this appointment today due to:   Chief Complaint   Patient presents with    Anxiety    Depression    Lower Back Pain         [address] is a 43 y.o. female patient. Patient is an established patient of mine. Back pain, depression, anxiety,    Patient scheduled this appointment today as a follow-up from previous visit. Patient was started on Cymbalta for depression and anxiety. However, Father passed away thanksgiving- still in shock. Patient did not like how Cymbalta made her feel stopped mentation after taking it for 2 weeks. Patient is willing to try another medications. We will trial her on Effexor. Father diagnosed with stage IV cancer    CERVICAL SPINE  Patient was doing physical therapy so that she can get her MRI done. Patient did not finish the course of the physical therapy due to a lot of pain. Patient was told that the MRI was recently approved therefore, this is currently scheduled. Patient was pleasantly surprised about this news. Patient complains of some chronic neck pain. This is chronic. Patient states that she has had this for a while. She has been having this for few months and have noticed some intermittent pain patient reported a previous relationship with a pain management doctor and have had a few injections but does not remember who it was. She denies any bowel or bladder issues. No pain radiations no weakness or falls. She wants to get established with a new pain management specialist. She wants to see them before trying physical therapy again, which she has already done. Will start gabapentin as well in the meantime. She also takes naproxen and tizanidine as needed. She states she gets minimal relief with these.            Impression Unremarkable examination of the lumbar spine. LUMBAR SPINE/ NECK PAIN/ KNEE PAIN  Patient reports mid low back pain she describes as achy sometimes stiff. She does report some pain radiation to the right buttocks to the right knee. She also reports some chronic right knee pain. She has had a previous x-rays in the past this is not visible to us today during the visit. Despite having been established with a pain management doctor patient would like to see another group of pain management doctors within [facility] Patient has been using some heat packs. She is currently on Suboxone due to a previous opiate use. She denies any relief with home remedies. Patient currently on  gabapentin, and Tizanidine. Reports some relief. Impression   No acute abnormality of the cervical spine. No significant degenerative changes     DEPRESSION with ANXIETY  Alejandra Valenzuela reported some ongoing issues with depression and anxiety. Father was recently diagnosed with stage IV cancer. Father passed thanksgiving  Patient is going on counseling therapy. Patient had dry mouth and heavy. Stopped medication. Tried 2 weeks. Patient states that she was on 1 type of antidepressant several years ago does not remember what it is but she does not remember how it made her feel. Today, she is willing to try a therapy includes Cymbalta, . she also denies suicidal/homicidal ideation, plan or intent. Lou Zuniga          TULIO-7 SCREENING 1/18/2021 12/21/2020   Feeling nervous, anxious, or on edge 1-Several days 2-Over half the days   Not able to stop or control worrying 1-Several days 2-Over half the days   Worrying too much about different things 1-Several days 2-Over half the days   Trouble relaxing 1-Several days 0-Not at all   Being so restless that it's hard to sit still 1-Several days 2-Over half the days   Becoming easily annoyed or irritable 1-Several days 2-Over half the days   Feeling afraid as if something awful might happen 1-Several days 0-Not at all   TULIO-7 Total Score 7 10       DEPRESSION SCREENING: Negative  PHQ Scores 11/17/2022 10/12/2022 5/24/2022 3/24/2022 1/18/2021 12/21/2020   PHQ2 Score 1 0 0 0 0 0   PHQ9 Score 2 0 0 0 0 0     VITAL SIGNS:  There were no vitals filed for this visit. Estimated body mass index is 23.87 kg/m² as calculated from the following:    Height as of 12/13/22: 5' 8\" (1.727 m). Weight as of 12/13/22: 157 lb (71.2 kg). Review of Systems   Constitutional:  Negative for chills and fever. Respiratory: Negative. Negative for shortness of breath and wheezing. Cardiovascular:  Negative for chest pain and palpitations. Gastrointestinal:  Negative for abdominal pain. Endocrine: Negative. Musculoskeletal:  Positive for arthralgias, gait problem, neck pain and neck stiffness. Allergic/Immunologic: Positive for environmental allergies. Neurological:  Negative for headaches. Psychiatric/Behavioral:  Positive for dysphoric mood and sleep disturbance. Negative for suicidal ideas. The patient is nervous/anxious. Physical Exam  Constitutional:       Appearance: Normal appearance. Pulmonary:      Effort: Pulmonary effort is normal.   Neurological:      Mental Status: She is alert and oriented to person, place, and time. Psychiatric:         Mood and Affect: Mood is anxious. Speech: Speech is delayed. Speech is not rapid and pressured. Behavior: Behavior is withdrawn. Thought Content: Thought content does not include suicidal ideation. MEDICATIONS     Medication List            Accurate as of December 15, 2022 10:58 AM. If you have any questions, ask your nurse or doctor.                 START taking these medications      venlafaxine 37.5 MG extended release capsule  Commonly known as: Effexor XR  Take 1 capsule by mouth daily  Started by: TABITHA Montes CNP            CONTINUE taking these medications      albuterol sulfate  (90 Base) MCG/ACT inhaler  Commonly known as: Proventil HFA  Inhale 2 puffs into the lungs every 6 hours as needed for Wheezing or Shortness of Breath Okay to substitute     buprenorphine-naloxone 8-2 MG Film SL film  Commonly known as: SUBOXONE     fluticasone 50 MCG/ACT nasal spray  Commonly known as: FLONASE  PLACE 1 SPRAY IN EACH NOSTRIL DAILY     gabapentin 100 MG capsule  Commonly known as: NEURONTIN  Take 1 capsule by mouth 3 times daily for 30 days. Handicap Placard Misc  by Does not apply route Expires in 5 years  11/17/2027     lisinopril 10 MG tablet  Commonly known as: PRINIVIL;ZESTRIL  Take 1 tablet by mouth daily Dose increased 10/13/2022. Goal BP bellow 135/85 and above 115/65, heart rate 56 to 90 bpm     naloxone 4 MG/0.1ML Liqd nasal spray     Prenatal Vitamins 28-0.8 MG Tabs  TAKE 1 TABLET BY MOUTH DAILY     tiZANidine 2 MG tablet  Commonly known as: ZANAFLEX  TAKE 1 TO 2 TABLETS BY MOUTH AT NIGHT     vitamin D 25 MCG (1000 UT) Tabs tablet  Commonly known as: CHOLECALCIFEROL  Take 1 tablet by mouth daily            STOP taking these medications      DULoxetine 30 MG extended release capsule  Commonly known as: CYMBALTA  Stopped by: TABITHA Rg CNP               Where to Get Your Medications        These medications were sent to [address] [place], [place] [address] [address]      Phone: [phone]   venlafaxine 37.5 MG extended release capsule        Controlled Substance Monitoring:  Acute and Chronic Pain Monitoring:   RX Monitoring 11/17/2022   Attestation -   Periodic Controlled Substance Monitoring No signs of potential drug abuse or diversion identified. ASSESSMENT/PLAN:  1. Grief  Worsening  Continue current therapy. effexor  Discussed how to recognize anxiety. Advised to relieve tension with exercise or a massage. Advised to get enough rest.  Advised to avoid alcohol, caffeine, nicotine, and illegal drugs. Which can increase anxiety level and cause sleep problems. 2. Adjustment reaction with anxiety and depression  Worsening  Stopped cymbalta  Start effexor  DISCUSSED and ADVISED TO:  Not stopping medication suddenly. See the specialist as discussed. Report for feelings of SI, HI, and hallucinations. Go to the ER for increasing urge to hurt yourself. - venlafaxine (EFFEXOR XR) 37.5 MG extended release capsule; Take 1 capsule by mouth daily  Dispense: 30 capsule; Refill: 1    3. Chronic neck pain  Stable  Continue current therapy. DISCUSSED and ADVISED TO:  Stay at a healthy weight. Continue exercises/PT  Stretch to help prevent stiffness and to prevent injury before exercise. Gentle forms of yoga help keep joints and muscles flexible. Walk instead of jog, ride a bike, swim, and water exercise. Lift weights as tolerated. strong muscles help reduce stress on joints. Take pain medicines exactly as directed and only as needed. On this date 12/15/2022 I have spent 25 minutes reviewing previous notes, test results and face to face with the patient discussing the diagnosis and importance of compliance with the treatment plan as well as documenting on the day of the visit. Return in about 4 weeks (around 1/12/2023) for Appt w/ Dr. Theodore Castellanos, started on Effexor. Trudy Milly was evaluated through a synchronous (real-time) audio encounter. Patient identification was verified at the start of the visit. She (or guardian if applicable) is aware that this is a billable service, which includes applicable co-pays. This visit was conducted with the patient's (and/or legal guardian's) verbal consent. She has not had a related appointment within my department in the past 7 days or scheduled within the next 24 hours. The patient was located at Home: [address] [place] [address]. The provider was located at [facility] (Appt Dept): [place] [address]  [address] [place] [address].   Note: not billable if this call serves to triage the patient into an appointment for the relevant concern   This note was completed by using the assistance of a speech-recognition program. However, inadvertent computerized transcription errors may be present. Although every effort was made to ensure accuracy, no guarantees can be provided that every mistake has been identified and corrected by editing.   Electronically signed by TABITHA Cash CNP on 5/23/22 at 4:34 PM EDT     --TABITHA Cash CNP

## 2022-12-15 ENCOUNTER — TELEMEDICINE (OUTPATIENT)
Dept: FAMILY MEDICINE CLINIC | Age: 42
End: 2022-12-15
Payer: COMMERCIAL

## 2022-12-15 DIAGNOSIS — M54.2 CHRONIC NECK PAIN: ICD-10-CM

## 2022-12-15 DIAGNOSIS — G89.29 CHRONIC MIDLINE LOW BACK PAIN WITH RIGHT-SIDED SCIATICA: ICD-10-CM

## 2022-12-15 DIAGNOSIS — G89.29 CHRONIC NECK PAIN: ICD-10-CM

## 2022-12-15 DIAGNOSIS — F43.23 ADJUSTMENT REACTION WITH ANXIETY AND DEPRESSION: ICD-10-CM

## 2022-12-15 DIAGNOSIS — M54.41 CHRONIC MIDLINE LOW BACK PAIN WITH RIGHT-SIDED SCIATICA: ICD-10-CM

## 2022-12-15 DIAGNOSIS — F43.21 GRIEF: Primary | ICD-10-CM

## 2022-12-15 PROCEDURE — G8427 DOCREV CUR MEDS BY ELIG CLIN: HCPCS | Performed by: FAMILY MEDICINE

## 2022-12-15 PROCEDURE — 99213 OFFICE O/P EST LOW 20 MIN: CPT | Performed by: FAMILY MEDICINE

## 2022-12-15 RX ORDER — VENLAFAXINE HYDROCHLORIDE 37.5 MG/1
37.5 CAPSULE, EXTENDED RELEASE ORAL DAILY
Qty: 30 CAPSULE | Refills: 1 | Status: SHIPPED | OUTPATIENT
Start: 2022-12-15

## 2022-12-15 RX ORDER — GABAPENTIN 100 MG/1
100 CAPSULE ORAL 3 TIMES DAILY
Qty: 90 CAPSULE | Refills: 0 | Status: SHIPPED | OUTPATIENT
Start: 2022-12-15 | End: 2023-01-14

## 2022-12-15 NOTE — PATIENT INSTRUCTIONS
New Updates for [facility] MyChart/ [facility] ([facility]) [facility]    Thank you for choosing US to give you the best care! [facility] ([facility]) is always trying to think of new ways to help their patients. We are asking all patients to try out the new digital registration that is now available through your [facility] account or the new [facility], [facility] ([facility]). Via the mason you're now able to update your personal and registration information prior to your upcoming appointment. This will save you time once you arrive at the office to check-in, not to mention your information remains safe!! Many other perks come from signing up for an account, such as:  Requesting refills  Scheduling an appointment  Completing an E-Visit  Sending a message to the office/provider  Having access to your medication list  Paying your bill/copay prior to your appointment  Scheduling your yearly mammogram  Review your test results    If you are not familiar with [facility] or the [facility] ([facility]) [facility]ON, please ask one of us and we will be happy to answer any questions or help you set-up your account.       Your [facility] office,  [place]

## 2022-12-19 ENCOUNTER — APPOINTMENT (OUTPATIENT)
Dept: PHYSICAL THERAPY | Age: 42
End: 2022-12-19
Payer: COMMERCIAL

## 2022-12-21 ENCOUNTER — APPOINTMENT (OUTPATIENT)
Dept: PHYSICAL THERAPY | Age: 42
End: 2022-12-21
Payer: COMMERCIAL

## 2022-12-21 DIAGNOSIS — G89.29 CHRONIC MIDLINE LOW BACK PAIN WITH RIGHT-SIDED SCIATICA: ICD-10-CM

## 2022-12-21 DIAGNOSIS — M54.2 CHRONIC NECK PAIN: ICD-10-CM

## 2022-12-21 DIAGNOSIS — G89.29 CHRONIC PAIN OF RIGHT KNEE: ICD-10-CM

## 2022-12-21 DIAGNOSIS — M54.41 CHRONIC MIDLINE LOW BACK PAIN WITH RIGHT-SIDED SCIATICA: ICD-10-CM

## 2022-12-21 DIAGNOSIS — G89.29 CHRONIC NECK PAIN: ICD-10-CM

## 2022-12-21 DIAGNOSIS — M25.561 CHRONIC PAIN OF RIGHT KNEE: ICD-10-CM

## 2022-12-22 RX ORDER — TIZANIDINE 2 MG/1
TABLET ORAL
Qty: 60 TABLET | Refills: 1 | Status: SHIPPED | OUTPATIENT
Start: 2022-12-22 | End: 2023-01-24 | Stop reason: SDUPTHER

## 2023-01-23 ENCOUNTER — PATIENT MESSAGE (OUTPATIENT)
Dept: FAMILY MEDICINE CLINIC | Age: 43
End: 2023-01-23

## 2023-01-23 DIAGNOSIS — M54.41 CHRONIC MIDLINE LOW BACK PAIN WITH RIGHT-SIDED SCIATICA: ICD-10-CM

## 2023-01-23 DIAGNOSIS — J30.9 CHRONIC ALLERGIC RHINITIS: ICD-10-CM

## 2023-01-23 DIAGNOSIS — G89.29 CHRONIC MIDLINE LOW BACK PAIN WITH RIGHT-SIDED SCIATICA: ICD-10-CM

## 2023-01-23 DIAGNOSIS — M54.2 CHRONIC NECK PAIN: ICD-10-CM

## 2023-01-23 DIAGNOSIS — M25.561 CHRONIC PAIN OF RIGHT KNEE: ICD-10-CM

## 2023-01-23 DIAGNOSIS — G89.29 CHRONIC NECK PAIN: ICD-10-CM

## 2023-01-23 DIAGNOSIS — G89.29 CHRONIC PAIN OF RIGHT KNEE: ICD-10-CM

## 2023-01-24 RX ORDER — FLUTICASONE PROPIONATE 50 MCG
2 SPRAY, SUSPENSION (ML) NASAL DAILY
Qty: 16 G | Refills: 3 | Status: SHIPPED | OUTPATIENT
Start: 2023-01-24

## 2023-01-24 RX ORDER — TIZANIDINE 2 MG/1
2-4 TABLET ORAL
Qty: 60 TABLET | Refills: 1 | Status: SHIPPED | OUTPATIENT
Start: 2023-01-24

## 2023-01-24 RX ORDER — GABAPENTIN 100 MG/1
100 CAPSULE ORAL 3 TIMES DAILY
Qty: 90 CAPSULE | Refills: 0 | Status: SHIPPED | OUTPATIENT
Start: 2023-01-24 | End: 2023-02-23

## 2023-01-24 NOTE — TELEPHONE ENCOUNTER
Please Approve or Refuse.   Send to Pharmacy per Pt's Request: Kevin Gael      Next Visit Date:  3/17/2023   Last Visit Date: 12/15/2022    Hemoglobin A1C (%)   Date Value   03/21/2022 5.1             ( goal A1C is < 7)   BP Readings from Last 3 Encounters:   12/13/22 (!) 149/98   11/17/22 126/80   11/16/22 137/88          (goal 120/80)  BUN   Date Value Ref Range Status   10/18/2022 5 (L) 6 - 20 mg/dL Final     Creatinine   Date Value Ref Range Status   10/18/2022 0.44 (L) 0.50 - 0.90 mg/dL Final     Potassium   Date Value Ref Range Status   10/18/2022 3.8 3.7 - 5.3 mmol/L Final

## 2023-01-24 NOTE — TELEPHONE ENCOUNTER
From: Alejandra Espinoza  To: Dr. Johana Ortega  Sent: 1/23/2023 6:28 PM EST  Subject: Refills    Need refills on tizandine. Also gabapentin. And allergy medication. And also flonase please.

## 2023-02-02 ENCOUNTER — PATIENT MESSAGE (OUTPATIENT)
Dept: FAMILY MEDICINE CLINIC | Age: 43
End: 2023-02-02

## 2023-02-02 DIAGNOSIS — Z80.3 FAMILY HISTORY OF BREAST CANCER IN MOTHER: Primary | ICD-10-CM

## 2023-02-02 DIAGNOSIS — Z12.31 ENCOUNTER FOR SCREENING MAMMOGRAM FOR BREAST CANCER: ICD-10-CM

## 2023-02-02 NOTE — TELEPHONE ENCOUNTER
Screening. Family history of breast cancer. Based on the [facility]) model, this patient's lifetime risk for   developing breast cancer is 40.6%. The [address] considers women with a 20% or greater lifetime   risk for developing breast cancer as \"high risk\" . Women at risk for breast   cancer may benefit from additional screening, which may include an annual   screening mammogram alternating every six (6) months with a breast MRI, as   appropriate.      From mammogram report, see above      Needs genetic counseling

## 2023-02-02 NOTE — TELEPHONE ENCOUNTER
From: Syeda Das  To: Dr. Mott Pals: 2/2/2023 7:11 AM EST  Subject: Breast MRI    I received a letter stating I need a breast MRI. I called and they want a referral. I had my mammogram last May. Then it came back and they requested a follow up in 6 months. Thank you. They must of sent to [address] [place] [address] and she might of not got to it.

## 2023-03-03 DIAGNOSIS — G89.29 CHRONIC MIDLINE LOW BACK PAIN WITH RIGHT-SIDED SCIATICA: ICD-10-CM

## 2023-03-03 DIAGNOSIS — M54.2 CHRONIC NECK PAIN: ICD-10-CM

## 2023-03-03 DIAGNOSIS — G89.29 CHRONIC NECK PAIN: ICD-10-CM

## 2023-03-03 DIAGNOSIS — M25.561 CHRONIC PAIN OF RIGHT KNEE: ICD-10-CM

## 2023-03-03 DIAGNOSIS — M54.41 CHRONIC MIDLINE LOW BACK PAIN WITH RIGHT-SIDED SCIATICA: ICD-10-CM

## 2023-03-03 DIAGNOSIS — G89.29 CHRONIC PAIN OF RIGHT KNEE: ICD-10-CM

## 2023-03-03 RX ORDER — GABAPENTIN 100 MG/1
100 CAPSULE ORAL 3 TIMES DAILY
Qty: 90 CAPSULE | Refills: 0 | OUTPATIENT
Start: 2023-03-03 | End: 2023-04-02

## 2023-03-03 RX ORDER — GABAPENTIN 100 MG/1
100 CAPSULE ORAL 3 TIMES DAILY
Qty: 90 CAPSULE | Refills: 0 | Status: SHIPPED | OUTPATIENT
Start: 2023-03-03 | End: 2023-04-02

## 2023-03-03 NOTE — TELEPHONE ENCOUNTER
Please Approve or Refuse.   Send to Pharmacy per Pt's Request:      Next Visit Date:  3/17/2023   Last Visit Date: 12/15/2022    Hemoglobin A1C (%)   Date Value   03/21/2022 5.1             ( goal A1C is < 7)   BP Readings from Last 3 Encounters:   12/13/22 (!) 149/98   11/17/22 126/80   11/16/22 137/88          (goal 120/80)  BUN   Date Value Ref Range Status   10/18/2022 5 (L) 6 - 20 mg/dL Final     Creatinine   Date Value Ref Range Status   10/18/2022 0.44 (L) 0.50 - 0.90 mg/dL Final     Potassium   Date Value Ref Range Status   10/18/2022 3.8 3.7 - 5.3 mmol/L Final

## 2023-03-08 ENCOUNTER — HOSPITAL ENCOUNTER (OUTPATIENT)
Dept: MRI IMAGING | Age: 43
Discharge: HOME | End: 2023-03-10
Payer: COMMERCIAL

## 2023-03-08 DIAGNOSIS — M54.16 LUMBAR RADICULOPATHY, CHRONIC: ICD-10-CM

## 2023-03-08 DIAGNOSIS — G89.29 CHRONIC MIDLINE LOW BACK PAIN WITH RIGHT-SIDED SCIATICA: ICD-10-CM

## 2023-03-08 DIAGNOSIS — M54.41 CHRONIC MIDLINE LOW BACK PAIN WITH RIGHT-SIDED SCIATICA: ICD-10-CM

## 2023-03-08 DIAGNOSIS — M54.2 CHRONIC NECK PAIN: ICD-10-CM

## 2023-03-08 DIAGNOSIS — G89.29 CHRONIC NECK PAIN: ICD-10-CM

## 2023-03-08 PROCEDURE — 72141 MRI NECK SPINE W/O DYE: CPT

## 2023-03-08 PROCEDURE — 72148 MRI LUMBAR SPINE W/O DYE: CPT

## 2023-03-17 ENCOUNTER — OFFICE VISIT (OUTPATIENT)
Dept: FAMILY MEDICINE CLINIC | Age: 43
End: 2023-03-17
Payer: COMMERCIAL

## 2023-03-17 DIAGNOSIS — Z80.3 FAMILY HISTORY OF BREAST CANCER IN MOTHER: ICD-10-CM

## 2023-03-17 DIAGNOSIS — M54.41 CHRONIC MIDLINE LOW BACK PAIN WITH RIGHT-SIDED SCIATICA: ICD-10-CM

## 2023-03-17 DIAGNOSIS — E55.9 VITAMIN D DEFICIENCY: ICD-10-CM

## 2023-03-17 DIAGNOSIS — G89.29 CHRONIC MIDLINE LOW BACK PAIN WITH RIGHT-SIDED SCIATICA: ICD-10-CM

## 2023-03-17 DIAGNOSIS — F43.10 PTSD (POST-TRAUMATIC STRESS DISORDER): ICD-10-CM

## 2023-03-17 DIAGNOSIS — Z51.81 MEDICATION MONITORING ENCOUNTER: ICD-10-CM

## 2023-03-17 DIAGNOSIS — B34.9 ACUTE VIRAL SYNDROME: Primary | ICD-10-CM

## 2023-03-17 DIAGNOSIS — F60.3 BORDERLINE PERSONALITY DISORDER (HCC): ICD-10-CM

## 2023-03-17 DIAGNOSIS — I10 ESSENTIAL HYPERTENSION: ICD-10-CM

## 2023-03-17 DIAGNOSIS — J45.21 MILD INTERMITTENT ASTHMA WITH EXACERBATION: ICD-10-CM

## 2023-03-17 DIAGNOSIS — M54.12 CERVICAL RADICULOPATHY: ICD-10-CM

## 2023-03-17 DIAGNOSIS — M50.30 DEGENERATION OF CERVICAL INTERVERTEBRAL DISC: ICD-10-CM

## 2023-03-17 PROCEDURE — 3074F SYST BP LT 130 MM HG: CPT | Performed by: FAMILY MEDICINE

## 2023-03-17 PROCEDURE — 3078F DIAST BP <80 MM HG: CPT | Performed by: FAMILY MEDICINE

## 2023-03-17 PROCEDURE — G8482 FLU IMMUNIZE ORDER/ADMIN: HCPCS | Performed by: FAMILY MEDICINE

## 2023-03-17 PROCEDURE — G8420 CALC BMI NORM PARAMETERS: HCPCS | Performed by: FAMILY MEDICINE

## 2023-03-17 PROCEDURE — G8427 DOCREV CUR MEDS BY ELIG CLIN: HCPCS | Performed by: FAMILY MEDICINE

## 2023-03-17 PROCEDURE — 99214 OFFICE O/P EST MOD 30 MIN: CPT | Performed by: FAMILY MEDICINE

## 2023-03-17 PROCEDURE — 1036F TOBACCO NON-USER: CPT | Performed by: FAMILY MEDICINE

## 2023-03-17 ASSESSMENT — PATIENT HEALTH QUESTIONNAIRE - PHQ9
5. POOR APPETITE OR OVEREATING: 0
1. LITTLE INTEREST OR PLEASURE IN DOING THINGS: 0
SUM OF ALL RESPONSES TO PHQ QUESTIONS 1-9: 2
SUM OF ALL RESPONSES TO PHQ QUESTIONS 1-9: 2
4. FEELING TIRED OR HAVING LITTLE ENERGY: 2
8. MOVING OR SPEAKING SO SLOWLY THAT OTHER PEOPLE COULD HAVE NOTICED. OR THE OPPOSITE, BEING SO FIGETY OR RESTLESS THAT YOU HAVE BEEN MOVING AROUND A LOT MORE THAN USUAL: 0
3. TROUBLE FALLING OR STAYING ASLEEP: 0
SUM OF ALL RESPONSES TO PHQ QUESTIONS 1-9: 2
10. IF YOU CHECKED OFF ANY PROBLEMS, HOW DIFFICULT HAVE THESE PROBLEMS MADE IT FOR YOU TO DO YOUR WORK, TAKE CARE OF THINGS AT HOME, OR GET ALONG WITH OTHER PEOPLE: 0
6. FEELING BAD ABOUT YOURSELF - OR THAT YOU ARE A FAILURE OR HAVE LET YOURSELF OR YOUR FAMILY DOWN: 0
7. TROUBLE CONCENTRATING ON THINGS, SUCH AS READING THE NEWSPAPER OR WATCHING TELEVISION: 0
SUM OF ALL RESPONSES TO PHQ9 QUESTIONS 1 & 2: 0
SUM OF ALL RESPONSES TO PHQ QUESTIONS 1-9: 2
2. FEELING DOWN, DEPRESSED OR HOPELESS: 0
9. THOUGHTS THAT YOU WOULD BE BETTER OFF DEAD, OR OF HURTING YOURSELF: 0

## 2023-03-17 ASSESSMENT — ENCOUNTER SYMPTOMS
CONSTIPATION: 0
BACK PAIN: 1
SHORTNESS OF BREATH: 1
DIARRHEA: 0
COUGH: 0
VOMITING: 0
ABDOMINAL DISTENTION: 0
ABDOMINAL PAIN: 0
CHEST TIGHTNESS: 0
WHEEZING: 0
NAUSEA: 0

## 2023-03-17 NOTE — PROGRESS NOTES
Alejandra Espinoza (:  1980) is a 42 y.o. female,New patient,  prior PCP Susie, she is here for evaluation of the following chief complaint(s): Hypertension (States been around high 130's/ 90's she has been running. She states that she checks blood pressure daily. ), Establish Care (No major concerns , will like to establish care), Fatigue (AT HOME COVID TEST WAS NEGATIVE ), Neck Pain, and Back Pain      ASSESSMENT/PLAN:    1. Acute viral syndrome  Ongoing  Per patient's reports, home covid test was negative  Increase fluids, vitamin C, Zinc and monitor  If yellow mucus develops, to call back and we will add antibiotics    -     albuterol sulfate HFA (PROVENTIL HFA) 108 (90 Base) MCG/ACT inhaler; Inhale 2 puffs into the lungs every 6 hours as needed for Wheezing or Shortness of Breath Okay to substitute, Disp-8 g, R-0Normal  2. Mild intermittent asthma with exacerbation  Mild  No wheezing, we will avoid steroids  Continue to monitor   -     albuterol sulfate HFA (PROVENTIL HFA) 108 (90 Base) MCG/ACT inhaler; Inhale 2 puffs into the lungs every 6 hours as needed for Wheezing or Shortness of Breath Okay to substitute, Disp-8 g, R-0Normal  3. Essential hypertension  Well controlled.  Continue current treatment. Lisinopril 10 mg  Will recheck labs.     -     CBC; Future  -     Comprehensive Metabolic Panel; Future  -     TSH; Future  -     Uric Acid; Future  -     Urinalysis with Reflex to Culture; Future  4. Chronic midline low back pain with right-sided sciatica  Improved with medication  Continue stretching, repositioning, Tizanidine, Gabapentin and follow up with pain management      5. Degeneration of cervical intervertebral disc  Likely worsening due to wear and tear nature of the disease.  For pain control, Continue stretching, repositioning, Tizanidine, Gabapentin and follow up with pain management   6. Cervical radiculopathy  Improved with medication  Continue stretching, repositioning, Tizanidine,  Gabapentin and follow up with pain management   7. Vitamin D deficiency  Unsure if improving or not. Will recheck level  Continue supplementation.    -     Vitamin D 25 Hydroxy; Future  8. PTSD (post-traumatic stress disorder)  Stable  Getting counseling    9. Borderline personality disorder ([facility] [address]  Stable  Getting counseling  10. Medication monitoring encounter  -     DRUG SCREEN, PAIN; Future  11. Family history of breast cancer in mother  -     Yessenia Lopez, [facility], [place], [place] [facility]  She has never had genetic testing, she would benefit from genetic testing    Controlled Substance Monitoring:    Acute and Chronic Pain Monitoring:   RX Monitoring 3/17/2023   Attestation -   Periodic Controlled Substance Monitoring Possible medication side effects, risk of tolerance/dependence & alternative treatments discussed. ;No signs of potential drug abuse or diversion identified. ;Assessed functional status. Alejandra received counseling on the following healthy behaviors: nutrition, exercise, and medication adherence  Reviewed prior labs and health maintenance  Discussed use, benefit, and side effects of prescribed medications. Barriers to medication compliance addressed. Patient given educational materials - see patient instructions  Was a self-tracking handout given in paper form or via [employer]? Yes  All patient questions answered. Patient voiced understanding. The patient's past medical,surgical, social, and family history as well as her current medications and allergies were reviewed as documented in today's encounter. Medications, labs, diagnostic studies, consultations and follow-up as documented in this encounter. Return in about 4 months (around 7/17/2023) for Visit type PHYSICAL, VISION screen, PHQ9. Karen Mcdaniels    Future Appointments   Date Time Provider Shaji Ortega   3/23/2023 10:20 AM Marquis Machado MD [address]   7/20/2023  9:30 AM [facility]         SUBJECTIVE/OBJECTIVE:    Alejandra complains of Chills, Fever, fatigue, body aches  Denies cough  Reports dyspnea, and has been using albuterol 3-4 times day, including yesterday and today. Prior to getting sick, she didn't need albuterol  She says she was previously diagnosed with asthma, and history of multifocal pneumonia. Denies wheezing  Has Decreased appetite. Denies sore throat, productive cough, headache, and runny nose, sinus pain or sinus pressure. Lost taste  since she got sick  Feels better today. Had 2 home covid test negative. Hypertension: Patient here for follow-up. She is exercising and is adherent to low salt diet. Blood pressure is well controlled at home. Cardiac symptoms dyspnea and fatigue. Patient denies chest pain, chest pressure/discomfort, claudication, exertional chest pressure/discomfort, irregular heart beat, lower extremity edema, near-syncope, orthopnea, palpitations, paroxysmal nocturnal dyspnea, syncope, and tachypnea. Cardiovascular risk factors: hypertension. Use of agents associated with hypertension: none. History of target organ damage: none. BP controlled. Alejandra reports compliance with BP medications, and tolerates them well, denies side effects. BP Readings from Last 3 Encounters:   03/17/23 120/80   12/13/22 (!) 149/98   11/17/22 126/80          Pulse is Normal.    Pulse Readings from Last 3 Encounters:   03/17/23 93   12/13/22 68   11/17/22 78       Weight has been stable. Wt Readings from Last 3 Encounters:   03/17/23 156 lb (70.8 kg)   12/13/22 157 lb (71.2 kg)   03/08/23 157 lb (71.2 kg)       Alejandra has chronic back pain shooting to the right side, for several years, not getting better  Intensity of pain is 6/10. Also has chronic neck pain, radiates to the right upper extremity into the hand, associated with numbness and tingling in the right hand.   Had MRIs  Will see pain management   Intensity of pain is 6/10    On Gabapentin, Tizanidine prn, stretching, repositioning, heating pad    On Suboxone, has Naloxone       She says she did Tdap, will get the records    12/13/2022--.lumbar degenerative disc disease    Degenerative change at L4-5 with mild spinal canal and moderate bilateral   neural foraminal stenosis. 2. Degenerative change at L3-4 with minimal right lateral recess and mild   bilateral neural foraminal stenosis. Cervical degenerative disc disease    1. Mild degenerative change at C5-6 with mild spinal canal and right neural   foraminal stenosis. 2. Mild right neural foraminal narrowing at C6-7.   3. Straightening of the normal cervical lordosis. Alejandra has Vitamin D deficiency. Alejandra  is  taking Vitamin D supplementation, taking multivitamin     she feels tired. Lab Results   Component Value Date    VITD25 29.2 (L) 10/18/2022       She says she is seeing, psychiatrist,  PTSD and Borderline personality  Doing therapy, which helps      PHQ-2 Over the past 2 weeks, how often have you been bothered by any of the following problems? Little interest or pleasure in doing things: Not at all  Feeling down, depressed, or hopeless: Not at all  PHQ-2 Score: 0  PHQ-9 Over the past 2 weeks, how often have you been bothered by any of the following problems? Trouble falling or staying asleep, or sleeping too much: Not at all  Feeling tired or having little energy: More than half the days  Poor appetite or overeating: Not at all  Feeling bad about yourself - or that you are a failure or have let yourself or your family down: Not at all  Trouble concentrating on things, such as reading the newspaper or watching television: Not at all  Moving or speaking so slowly that other people could have noticed.  Or the opposite - being so fidgety or restless that you have been moving around a lot more than usual: Not at all  Thoughts that you would be better off dead, or of hurting yourself in some way: Not at all  If you checked off any problems, how difficult have these problems made it for you to do your work, take care of things at home, or get along with other people?: Not difficult at all  PHQ-9 Total Score: 2  PHQ-9 Total Score: 2         [x]Negative depression screening. PHQ Scores 3/17/2023 11/17/2022 10/12/2022 5/24/2022 3/24/2022 1/18/2021 12/21/2020   PHQ2 Score 0 1 0 0 0 0 0   PHQ9 Score 2 2 0 0 0 0 0       Prior to Visit Medications    Medication Sig Taking? Authorizing Provider   gabapentin (NEURONTIN) 100 MG capsule Take 1 capsule by mouth 3 times daily for 30 days. Yes Leni Wood MD   tiZANidine (ZANAFLEX) 2 MG tablet Take 1-2 tablets by mouth nightly as needed (Back pain) TAKE 1 TO 2 TABLETS BY MOUTH AT NIGHT Yes Leni Wood MD   fluticasone (FLONASE) 50 MCG/ACT nasal spray 2 sprays by Nasal route daily Yes Leni Wood MD   Prenatal Vit-Fe Fumarate-FA (PRENATAL VITAMINS) 28-0.8 MG TABS TAKE 1 TABLET BY MOUTH DAILY Yes TABITHA Hopson CNP   albuterol sulfate HFA (PROVENTIL HFA) 108 (90 Base) MCG/ACT inhaler Inhale 2 puffs into the lungs every 6 hours as needed for Wheezing or Shortness of Breath Okay to substitute Yes TABITHA Hopson CNP   Handicap Placard MISC by Does not apply route Expires in 5 years  11/17/2027 Yes TABITHA Hopson CNP   lisinopril (PRINIVIL;ZESTRIL) 10 MG tablet Take 1 tablet by mouth daily Dose increased 10/13/2022.  Goal BP bellow 135/85 and above 115/65, heart rate 56 to 90 bpm Yes Johana Ortega MD   naloxone 4 MG/0.1ML LIQD nasal spray 1 spray by Nasal route as needed Yes Historical Provider, MD   vitamin D (CHOLECALCIFEROL) 25 MCG (1000 UT) TABS tablet Take 1 tablet by mouth daily Yes TABITHA Hopson CNP   buprenorphine-naloxone (SUBOXONE) 8-2 MG FILM SL film  Yes Historical Provider, MD   venlafaxine (EFFEXOR XR) 37.5 MG extended release capsule Take 1 capsule by mouth daily  Patient not taking: Reported on 3/17/2023  Susie Mathews, APRN - CNP       Social History     Tobacco Use    Smoking status: Former     Packs/day: 1.00     Years: 10.00     Pack years: 10.00     Types: Cigarettes    Smokeless tobacco: Never    Tobacco comments:     1 pack every two days   Substance Use Topics    Alcohol use: Not Currently     Comment: social    Drug use: No         Review of Systems   Constitutional:  Positive for appetite change, chills, fatigue and fever. Negative for activity change, diaphoresis and unexpected weight change. HENT:  Positive for congestion. Negative for nosebleeds, postnasal drip, rhinorrhea, sinus pressure, sinus pain and sore throat. Respiratory:  Positive for shortness of breath (BAINS, using Albuterol more). Negative for cough, chest tightness and wheezing. Cardiovascular:  Negative for chest pain, palpitations and leg swelling. Gastrointestinal:  Negative for abdominal distention, abdominal pain, constipation, diarrhea, nausea and vomiting. Endocrine: Negative for cold intolerance, heat intolerance, polydipsia, polyphagia and polyuria. Musculoskeletal:  Positive for back pain, myalgias and neck pain. Neurological:  Positive for numbness (RUE). Negative for weakness and headaches. Hematological:  Does not bruise/bleed easily. Psychiatric/Behavioral:  Negative for dysphoric mood and sleep disturbance. The patient is nervous/anxious.        -vital signs stable and within normal limits     /80   Pulse 93   Temp (!) 100.5 °F (38.1 °C)   Ht 5' 8\" (1.727 m)   Wt 156 lb (70.8 kg)   LMP 02/27/2023 (Approximate)   SpO2 98%   BMI 23.72 kg/m²        Physical Exam  Vitals and nursing note reviewed. Constitutional:       General: She is not in acute distress. Appearance: Normal appearance. She is well-developed. She is not diaphoretic. HENT:      Head: Normocephalic and atraumatic.       Right Ear: External ear normal.      Left Ear: External ear normal. Mouth/Throat:      Comments: I did not examine the mouth due to coronavirus pandemic and wearing masks    Eyes:      General: Lids are normal. No scleral icterus. Right eye: No discharge. Left eye: No discharge. Extraocular Movements: Extraocular movements intact. Conjunctiva/sclera: Conjunctivae normal.   Neck:      Thyroid: No thyromegaly. Cardiovascular:      Rate and Rhythm: Normal rate and regular rhythm. Heart sounds: Normal heart sounds. No murmur heard. Pulmonary:      Effort: Pulmonary effort is normal. No respiratory distress. Breath sounds: Normal breath sounds. No wheezing or rales. Chest:      Chest wall: No tenderness. Abdominal:      General: Bowel sounds are normal. There is no distension. Palpations: Abdomen is soft. There is no hepatomegaly or splenomegaly. Tenderness: There is no abdominal tenderness. Musculoskeletal:         General: No tenderness. Cervical back: Neck supple. Spasms and bony tenderness present. Decreased range of motion. Lumbar back: Spasms and bony tenderness present. Decreased range of motion. Positive right straight leg raise test.      Right lower leg: No edema. Left lower leg: No edema. Skin:     General: Skin is warm and dry. Capillary Refill: Capillary refill takes less than 2 seconds. Findings: No rash. Neurological:      Mental Status: She is alert and oriented to person, place, and time. Cranial Nerves: No cranial nerve deficit. Motor: No abnormal muscle tone. Psychiatric:         Attention and Perception: Attention normal.         Mood and Affect: Mood is anxious. Speech: Speech normal.         Behavior: Behavior normal.         Thought Content: Thought content normal.         Cognition and Memory: Cognition normal.         Judgment: Judgment normal.         I personally reviewed testing with patient and all questions fully answered.   Vitamin D deficiency      Otherwise labs within normal limits    Hospital Outpatient Visit on 10/18/2022   Component Date Value Ref Range Status    WBC 10/18/2022 6.2  3.5 - 11.3 k/uL Final    RBC 10/18/2022 4.06  3.95 - 5.11 m/uL Final    Hemoglobin 10/18/2022 12.0  11.9 - 15.1 g/dL Final    Hematocrit 10/18/2022 39.4  36.3 - 47.1 % Final    MCV 10/18/2022 97.0  82.6 - 102.9 fL Final    MCH 10/18/2022 29.6  25.2 - 33.5 pg Final    MCHC 10/18/2022 30.5  28.4 - 34.8 g/dL Final    RDW 10/18/2022 14.2  11.8 - 14.4 % Final    Platelets 97/39/9245 335  138 - 453 k/uL Final    MPV 10/18/2022 10.7  8.1 - 13.5 fL Final    NRBC Automated 10/18/2022 0.0  0.0 per 100 WBC Final    Seg Neutrophils 10/18/2022 65  36 - 65 % Final    Lymphocytes 10/18/2022 23 (A)  24 - 43 % Final    Monocytes 10/18/2022 9  3 - 12 % Final    Eosinophils % 10/18/2022 2  1 - 4 % Final    Basophils 10/18/2022 1  0 - 2 % Final    Immature Granulocytes 10/18/2022 0  0 % Final    Segs Absolute 10/18/2022 4.10  1.50 - 8.10 k/uL Final    Absolute Lymph # 10/18/2022 1.41  1.10 - 3.70 k/uL Final    Absolute Mono # 10/18/2022 0.57  0.10 - 1.20 k/uL Final    Absolute Eos # 10/18/2022 0.10  0.00 - 0.44 k/uL Final    Basophils Absolute 10/18/2022 0.03  0.00 - 0.20 k/uL Final    Absolute Immature Granulocyte 10/18/2022 <0.03  0.00 - 0.30 k/uL Final    Vitamin B-12 10/18/2022 385  232 - 1245 pg/mL Final    Folate 10/18/2022 15.8  >4.8 ng/mL Final    Glucose 10/18/2022 88  70 - 99 mg/dL Final    BUN 10/18/2022 5 (A)  6 - 20 mg/dL Final    Creatinine 10/18/2022 0.44 (A)  0.50 - 0.90 mg/dL Final    Est, Glom Filt Rate 10/18/2022 >60  >60 mL/min/1.73m2 Final    Comment:       Effective Oct 3, 2022        These results are not intended for use in patients <25years of age. eGFR results are calculated without a race factor using the 2021 CKD-EPI equation.   Careful clinical correlation is recommended, particularly when comparing to results   calculated using previous equations. The CKD-EPI equation is less accurate in patients with extremes of muscle mass, extra-renal   metabolism of creatine, excessive creatine ingestion, or following therapy that affects   renal tubular secretion.       Calcium 10/18/2022 8.9  8.6 - 10.4 mg/dL Final    Sodium 10/18/2022 139  135 - 144 mmol/L Final    Potassium 10/18/2022 3.8  3.7 - 5.3 mmol/L Final    Chloride 10/18/2022 101  98 - 107 mmol/L Final    CO2 10/18/2022 27  20 - 31 mmol/L Final    Anion Gap 10/18/2022 11  9 - 17 mmol/L Final    Alkaline Phosphatase 10/18/2022 69  35 - 104 U/L Final    ALT 10/18/2022 6  5 - 33 U/L Final    AST 10/18/2022 15  <32 U/L Final    Total Bilirubin 10/18/2022 0.4  0.3 - 1.2 mg/dL Final    Total Protein 10/18/2022 7.2  6.4 - 8.3 g/dL Final    Albumin 10/18/2022 3.9  3.5 - 5.2 g/dL Final    Albumin/Globulin Ratio 10/18/2022 1.2  1.0 - 2.5 Final    Uric Acid 10/18/2022 4.1  2.4 - 5.7 mg/dL Final    Ferritin 10/18/2022 65  13 - 150 ng/mL Final    Iron 10/18/2022 125  37 - 145 ug/dL Final    TIBC 10/18/2022 297  250 - 450 ug/dL Final    Iron Saturation 10/18/2022 42  20 - 55 % Final    UIBC 10/18/2022 172  112 - 347 ug/dL Final    Vit D, 25-Hydroxy 10/18/2022 29.2 (A)  >29.9 ng/mL Final    Comment:    Reference Range:  Vitamin D status         Range   Deficiency              <20 ng/mL   Mild Deficiency       20-30 ng/mL   Sufficiency           ng/mL   Toxicity               >100 ng/mL      Magnesium 10/18/2022 1.8  1.6 - 2.6 mg/dL Final       Lab Results   Component Value Date    TSH 1.73 03/21/2022       No results found for: CHOL  No results found for: TRIG  Lab Results   Component Value Date    HDL 47 03/21/2022     Lab Results   Component Value Date    LDLCHOLESTEROL 98 03/21/2022     Lab Results   Component Value Date    CHOLHDLRATIO 3.6 03/21/2022         Orders Placed This Encounter   Medications    albuterol sulfate HFA (PROVENTIL HFA) 108 (90 Base) MCG/ACT inhaler     Sig: Inhale 2 puffs into the lungs every 6 hours as needed for Wheezing or Shortness of Breath Okay to substitute     Dispense:  8 g     Refill:  0         Orders Placed This Encounter   Procedures    CBC     Standing Status:   Future     Standing Expiration Date:   3/17/2024    Comprehensive Metabolic Panel     Standing Status:   Future     Standing Expiration Date:   3/17/2024    TSH     Standing Status:   Future     Standing Expiration Date:   3/17/2024    Uric Acid     Standing Status:   Future     Standing Expiration Date:   3/17/2024    Vitamin D 25 Hydroxy     Standing Status:   Future     Standing Expiration Date:   3/17/2024    Urinalysis with Reflex to Culture     Standing Status:   Future     Standing Expiration Date:   3/17/2024     Order Specific Question:   SPECIFY(EX-CATH,MIDSTREAM,CYSTO,ETC)? Answer:   MIDSTREAM    DRUG SCREEN, PAIN     Standing Status:   Future     Standing Expiration Date:   5/17/2023     Scheduling Instructions:      Gabapentin and Suboxone    Yessenia Owenra Scot, [facility], [place], [facility]     Referral Priority:   Routine     Referral Type:   Eval and Treat     Referral Reason:   Specialty Services Required     Referred to Provider:   Nithya Grey     Requested Specialty:   Genetic Counselor     Number of Visits Requested:   1       Medications Discontinued During This Encounter   Medication Reason    venlafaxine (EFFEXOR XR) 37.5 MG extended release capsule Patient Choice         On this date 3/17/2023 I have spent 39 minutes reviewing previous notes, test results and face to face with the patient discussing the diagnosis and importance of compliance with the treatment plan as well as documenting on the day of the visit. This note was completed by using the assistance of a speech-recognition program. However, inadvertent computerized transcription errors may be present.  Although every effort was made to ensure accuracy, no guarantees can be provided that every mistake has been identified and corrected by editing. An electronic signature was used to authenticate this note.   Electronically signed by Eric Patel MD on 3/19/2023 at 3:13 PM

## 2023-03-19 VITALS
WEIGHT: 156 LBS | HEART RATE: 93 BPM | TEMPERATURE: 100.5 F | HEIGHT: 68 IN | BODY MASS INDEX: 23.64 KG/M2 | SYSTOLIC BLOOD PRESSURE: 120 MMHG | DIASTOLIC BLOOD PRESSURE: 80 MMHG | OXYGEN SATURATION: 98 %

## 2023-03-19 PROBLEM — F43.21 GRIEF: Status: RESOLVED | Noted: 2022-12-15 | Resolved: 2023-03-19

## 2023-03-19 PROBLEM — R06.82 TACHYPNEA: Status: RESOLVED | Noted: 2022-09-30 | Resolved: 2023-03-19

## 2023-03-19 PROBLEM — J45.21 MILD INTERMITTENT ASTHMA WITH EXACERBATION: Status: ACTIVE | Noted: 2023-03-19

## 2023-03-19 PROBLEM — E46 PROTEIN MALNUTRITION (HCC): Status: RESOLVED | Noted: 2022-03-24 | Resolved: 2023-03-19

## 2023-03-19 PROBLEM — Z92.29 HISTORY OF OPIATE THERAPY: Status: RESOLVED | Noted: 2020-12-21 | Resolved: 2023-03-19

## 2023-03-19 PROBLEM — O14.93 PRE-ECLAMPSIA IN THIRD TRIMESTER: Status: RESOLVED | Noted: 2021-06-23 | Resolved: 2023-03-19

## 2023-03-19 PROBLEM — J18.9 MULTIFOCAL PNEUMONIA: Status: RESOLVED | Noted: 2022-09-28 | Resolved: 2023-03-19

## 2023-03-19 PROBLEM — E87.6 HYPOKALEMIA: Status: RESOLVED | Noted: 2022-09-28 | Resolved: 2023-03-19

## 2023-03-19 PROBLEM — F43.23 ADJUSTMENT REACTION WITH ANXIETY AND DEPRESSION: Status: RESOLVED | Noted: 2022-11-17 | Resolved: 2023-03-19

## 2023-03-19 PROBLEM — M54.12 CERVICAL RADICULOPATHY: Status: ACTIVE | Noted: 2023-03-19

## 2023-03-19 RX ORDER — ALBUTEROL SULFATE 90 UG/1
2 AEROSOL, METERED RESPIRATORY (INHALATION) EVERY 6 HOURS PRN
Qty: 8 G | Refills: 0 | Status: SHIPPED | OUTPATIENT
Start: 2023-03-19

## 2023-03-19 ASSESSMENT — ENCOUNTER SYMPTOMS
RHINORRHEA: 0
SINUS PRESSURE: 0
SINUS PAIN: 0
SORE THROAT: 0

## 2023-03-20 ENCOUNTER — HOSPITAL ENCOUNTER (OUTPATIENT)
Age: 43
Discharge: HOME | End: 2023-03-20
Payer: COMMERCIAL

## 2023-03-20 DIAGNOSIS — E55.9 VITAMIN D DEFICIENCY: ICD-10-CM

## 2023-03-20 DIAGNOSIS — Z51.81 MEDICATION MONITORING ENCOUNTER: ICD-10-CM

## 2023-03-20 DIAGNOSIS — I10 ESSENTIAL HYPERTENSION: ICD-10-CM

## 2023-03-20 DIAGNOSIS — D64.9 ANEMIA, UNSPECIFIED TYPE: ICD-10-CM

## 2023-03-20 DIAGNOSIS — E55.9 VITAMIN D DEFICIENCY: Primary | ICD-10-CM

## 2023-03-20 LAB
25(OH)D3 SERPL-MCNC: 17.7 NG/ML
ALBUMIN SERPL-MCNC: 3.3 G/DL (ref 3.5–5.2)
ALBUMIN/GLOBULIN RATIO: 1 (ref 1–2.5)
ALP SERPL-CCNC: 57 U/L (ref 35–104)
ALT SERPL-CCNC: 10 U/L (ref 5–33)
ANION GAP SERPL CALCULATED.3IONS-SCNC: 11 MMOL/L (ref 9–17)
AST SERPL-CCNC: 15 U/L
BILIRUB SERPL-MCNC: <0.1 MG/DL (ref 0.3–1.2)
BILIRUBIN URINE: NEGATIVE
BUN SERPL-MCNC: 6 MG/DL (ref 6–20)
CALCIUM SERPL-MCNC: 8.7 MG/DL (ref 8.6–10.4)
CASTS UA: NORMAL /LPF (ref 0–8)
CHLORIDE SERPL-SCNC: 104 MMOL/L (ref 98–107)
CO2 SERPL-SCNC: 26 MMOL/L (ref 20–31)
COLOR: YELLOW
CREAT SERPL-MCNC: 0.48 MG/DL (ref 0.5–0.9)
EPITHELIAL CELLS UA: NORMAL /HPF (ref 0–5)
GFR SERPL CREATININE-BSD FRML MDRD: >60 ML/MIN/1.73M2
GLUCOSE SERPL-MCNC: 91 MG/DL (ref 70–99)
GLUCOSE UR STRIP.AUTO-MCNC: NEGATIVE MG/DL
HCT VFR BLD AUTO: 33 % (ref 36.3–47.1)
HGB BLD-MCNC: 10.6 G/DL (ref 11.9–15.1)
KETONES UR STRIP.AUTO-MCNC: NEGATIVE MG/DL
LEUKOCYTE ESTERASE UR QL STRIP.AUTO: NEGATIVE
MCH RBC QN AUTO: 30.3 PG (ref 25.2–33.5)
MCHC RBC AUTO-ENTMCNC: 32.1 G/DL (ref 28.4–34.8)
MCV RBC AUTO: 94.3 FL (ref 82.6–102.9)
NITRITE UR QL STRIP.AUTO: NEGATIVE
NRBC AUTOMATED: 0 PER 100 WBC
PDW BLD-RTO: 13.3 % (ref 11.8–14.4)
PLATELET # BLD AUTO: 446 K/UL (ref 138–453)
PMV BLD AUTO: 10.6 FL (ref 8.1–13.5)
POTASSIUM SERPL-SCNC: 3.7 MMOL/L (ref 3.7–5.3)
PROT SERPL-MCNC: 6.6 G/DL (ref 6.4–8.3)
PROT UR STRIP.AUTO-MCNC: 6.5 MG/DL (ref 5–8)
PROT UR STRIP.AUTO-MCNC: NEGATIVE MG/DL
RBC # BLD: 3.5 M/UL (ref 3.95–5.11)
RBC CLUMPS #/AREA URNS AUTO: NORMAL /HPF (ref 0–4)
SODIUM SERPL-SCNC: 141 MMOL/L (ref 135–144)
SPECIFIC GRAVITY UA: 1.01 (ref 1–1.03)
TSH SERPL-ACNC: 1 UIU/ML (ref 0.3–5)
TURBIDITY: CLEAR
URATE SERPL-MCNC: 3.7 MG/DL (ref 2.4–5.7)
URINE HGB: ABNORMAL
UROBILINOGEN, URINE: NORMAL
WBC # BLD AUTO: 10 K/UL (ref 3.5–11.3)
WBC UA: NORMAL /HPF (ref 0–5)

## 2023-03-20 PROCEDURE — G0481 DRUG TEST DEF 8-14 CLASSES: HCPCS

## 2023-03-20 PROCEDURE — 84550 ASSAY OF BLOOD/URIC ACID: CPT

## 2023-03-20 PROCEDURE — 82306 VITAMIN D 25 HYDROXY: CPT

## 2023-03-20 PROCEDURE — 85027 COMPLETE CBC AUTOMATED: CPT

## 2023-03-20 PROCEDURE — 36415 COLL VENOUS BLD VENIPUNCTURE: CPT

## 2023-03-20 PROCEDURE — 80307 DRUG TEST PRSMV CHEM ANLYZR: CPT

## 2023-03-20 PROCEDURE — 84443 ASSAY THYROID STIM HORMONE: CPT

## 2023-03-20 PROCEDURE — 80053 COMPREHEN METABOLIC PANEL: CPT

## 2023-03-20 PROCEDURE — 81001 URINALYSIS AUTO W/SCOPE: CPT

## 2023-03-20 RX ORDER — ERGOCALCIFEROL 1.25 MG/1
50000 CAPSULE ORAL WEEKLY
Qty: 12 CAPSULE | Refills: 0 | Status: SHIPPED | OUTPATIENT
Start: 2023-03-20

## 2023-03-20 NOTE — RESULT ENCOUNTER NOTE
Please notify patient: Vitamin D very low, I will send to the pharmacy high dosage vitamin D to take weekly with food. The low dosage  anyway. Mildly low albumin level, increase proteins in diet, cottage cheese, yogurt, meat, protein bars    Anemia is worsening there is blood in the urine, does she have heavy periods?  Was she on her period when had the urine test?  We need to figure out the cause of her anemia    Otherwise labs within normal limits  continue current treatment    Future Appointments  3/23/2023  10:20 AM   Alexa Rodriguez MD           [facility]  3/28/2023  10:45 AM   [facility]                 [facility]            [facility]  2023  9:30 AM    Lupe Jim MD     fp sc               [id number]

## 2023-03-23 ENCOUNTER — HOSPITAL ENCOUNTER (OUTPATIENT)
Dept: PAIN MANAGEMENT | Age: 43
Discharge: HOME | End: 2023-03-23
Payer: COMMERCIAL

## 2023-03-23 ENCOUNTER — PATIENT MESSAGE (OUTPATIENT)
Dept: FAMILY MEDICINE CLINIC | Age: 43
End: 2023-03-23

## 2023-03-23 VITALS
DIASTOLIC BLOOD PRESSURE: 98 MMHG | SYSTOLIC BLOOD PRESSURE: 144 MMHG | BODY MASS INDEX: 23.34 KG/M2 | HEART RATE: 77 BPM | OXYGEN SATURATION: 98 % | WEIGHT: 154 LBS | HEIGHT: 68 IN | TEMPERATURE: 97.3 F

## 2023-03-23 DIAGNOSIS — G89.29 CHRONIC MIDLINE LOW BACK PAIN WITH RIGHT-SIDED SCIATICA: ICD-10-CM

## 2023-03-23 DIAGNOSIS — M54.2 CHRONIC NECK PAIN: ICD-10-CM

## 2023-03-23 DIAGNOSIS — M54.41 CHRONIC MIDLINE LOW BACK PAIN WITH RIGHT-SIDED SCIATICA: Primary | ICD-10-CM

## 2023-03-23 DIAGNOSIS — R31.9 HEMATURIA, UNSPECIFIED TYPE: ICD-10-CM

## 2023-03-23 DIAGNOSIS — G89.29 CHRONIC MIDLINE LOW BACK PAIN WITH RIGHT-SIDED SCIATICA: Primary | ICD-10-CM

## 2023-03-23 DIAGNOSIS — M54.16 RIGHT LUMBAR RADICULITIS: ICD-10-CM

## 2023-03-23 DIAGNOSIS — G89.29 CHRONIC NECK PAIN: ICD-10-CM

## 2023-03-23 DIAGNOSIS — I10 ESSENTIAL HYPERTENSION: ICD-10-CM

## 2023-03-23 DIAGNOSIS — M25.561 CHRONIC PAIN OF RIGHT KNEE: ICD-10-CM

## 2023-03-23 DIAGNOSIS — M54.41 CHRONIC MIDLINE LOW BACK PAIN WITH RIGHT-SIDED SCIATICA: ICD-10-CM

## 2023-03-23 DIAGNOSIS — J20.9 ACUTE BRONCHITIS DUE TO INFECTION: ICD-10-CM

## 2023-03-23 DIAGNOSIS — M54.12 CERVICAL RADICULOPATHY: ICD-10-CM

## 2023-03-23 DIAGNOSIS — J01.80 ACUTE NON-RECURRENT SINUSITIS OF OTHER SINUS: Primary | ICD-10-CM

## 2023-03-23 DIAGNOSIS — G89.29 CHRONIC PAIN OF RIGHT KNEE: ICD-10-CM

## 2023-03-23 DIAGNOSIS — D64.9 ANEMIA, UNSPECIFIED TYPE: ICD-10-CM

## 2023-03-23 PROCEDURE — 99215 OFFICE O/P EST HI 40 MIN: CPT | Performed by: ANESTHESIOLOGY

## 2023-03-23 PROCEDURE — 99213 OFFICE O/P EST LOW 20 MIN: CPT

## 2023-03-23 RX ORDER — AZITHROMYCIN 250 MG/1
TABLET, FILM COATED ORAL
Qty: 6 TABLET | Refills: 0 | Status: SHIPPED | OUTPATIENT
Start: 2023-03-23 | End: 2023-03-28

## 2023-03-23 RX ORDER — TIZANIDINE 2 MG/1
2-4 TABLET ORAL
Qty: 60 TABLET | Refills: 1 | Status: SHIPPED | OUTPATIENT
Start: 2023-03-23

## 2023-03-23 RX ORDER — LISINOPRIL 10 MG/1
10 TABLET ORAL DAILY
Qty: 90 TABLET | Refills: 3 | Status: SHIPPED | OUTPATIENT
Start: 2023-03-23

## 2023-03-23 ASSESSMENT — PAIN SCALES - GENERAL: PAINLEVEL_OUTOF10: 7

## 2023-03-23 ASSESSMENT — ENCOUNTER SYMPTOMS
RESPIRATORY NEGATIVE: 1
EYES NEGATIVE: 1
BACK PAIN: 1

## 2023-03-23 NOTE — TELEPHONE ENCOUNTER
Please Approve or Refuse.   Send to Pharmacy per Pt's Request:      Next Visit Date:  7/20/2023   Last Visit Date: 3/17/2023    Hemoglobin A1C (%)   Date Value   03/21/2022 5.1             ( goal A1C is < 7)   BP Readings from Last 3 Encounters:   03/17/23 120/80   12/13/22 (!) 149/98   11/17/22 126/80          (goal 120/80)  BUN   Date Value Ref Range Status   03/20/2023 6 6 - 20 mg/dL Final     Creatinine   Date Value Ref Range Status   03/20/2023 0.48 (L) 0.50 - 0.90 mg/dL Final     Potassium   Date Value Ref Range Status   03/20/2023 3.7 3.7 - 5.3 mmol/L Final

## 2023-03-23 NOTE — TELEPHONE ENCOUNTER
Please Approve or Refuse.   Send to Pharmacy per Pt's Request:      Next Visit Date:  3/23/2023   Last Visit Date: 3/17/2023    Hemoglobin A1C (%)   Date Value   03/21/2022 5.1             ( goal A1C is < 7)   BP Readings from Last 3 Encounters:   03/17/23 120/80   12/13/22 (!) 149/98   11/17/22 126/80          (goal 120/80)  BUN   Date Value Ref Range Status   03/20/2023 6 6 - 20 mg/dL Final     Creatinine   Date Value Ref Range Status   03/20/2023 0.48 (L) 0.50 - 0.90 mg/dL Final     Potassium   Date Value Ref Range Status   03/20/2023 3.7 3.7 - 5.3 mmol/L Final

## 2023-03-23 NOTE — PROGRESS NOTES
The patient is a 43 y. o.  /  female.     Chief Complaint   Patient presents with    Back Pain    Neck Pain     MRI results        HPI    Neck pain  Located over the right side of the neck radiates down right arm to the hand with associated intermittent numbness and tingling  Chronic going on for more than 1 year recently completed therapy with limited benefit  Had recent MRI of the neck  No previous neck injection or surgical history  Pain aggravated with neck movement  Denies any loss of bladder or bowel control    Back pain  Chronic going on for more than 1 year located in the lower lumbar area on the right side with radiation down right leg all the way to the ankle  Intermittent numbness and tingling  No changes in bladder or bowel control  No previous lumbar spine injection or surgical history  Had recent MRI lumbar spine  Tried physical therapy with pain aggravation  Patient of tried NSAIDs and muscle relaxant and gabapentin    Pain is worsening affecting quality of life and activity level      Patient is here today for: back & neck pain  Pain level:  back - 7 / neck - 6  Character:  aching, shooting and stabbing  Radiating:  Right leg & Right arm  Weakness or numbness:  no  Aggravating Factors:  pain is just there  Alleviating Factors:  nothing  Constant or intermitting:  constant  Bladder/bowel loss:  no      Past Medical History:   Diagnosis Date    Acute respiratory failure with hypoxia ([facility] [address] 09/30/2022    Adjustment reaction with anxiety and depression 11/17/2022    Advanced maternal age in multigravida 02/10/2021    Allergic rhinitis     Anxiety 02/13/2015    Chronic midline low back pain with right-sided sciatica 05/24/2022    COPD (chronic obstructive pulmonary disease) ([facility] [address] 9-    Degeneration of cervical intervertebral disc 12/21/2020    Depression 12/05/2012    Dichorionic diamniotic twin pregnancy, antepartum 02/10/2021    Formatting of this note might be different from the

## 2023-03-23 NOTE — TELEPHONE ENCOUNTER
From: Moncho Pathak  To: Dr. Sanjay Calabrese: 3/23/2023 8:54 AM EDT  Subject: Labs. Good morning. Yes I am on menstrual cycle, and was on it at time labs done. Do I need to retake them over? Also I am coughing up green flem. Doctor said to let her know, so she can call in antibiotic.

## 2023-03-24 LAB
6-ACETYLMORPHINE, UR: NOT DETECTED
7-AMINOCLONAZEPAM, URINE: NOT DETECTED
ALPHA-OH-ALPRAZ, URINE: NOT DETECTED
ALPHA-OH-MIDAZOLAM, URINE: NOT DETECTED
ALPRAZOLAM, URINE: NOT DETECTED
AMPHETAMINES, URINE: NOT DETECTED
BARBITURATES, URINE: NOT DETECTED
BENZOYLECGONINE, UR: NOT DETECTED
BUPRENORPHINE URINE: PRESENT
CARISOPRODOL, UR: NOT DETECTED
CLONAZEPAM, URINE: NOT DETECTED
CODEINE, URINE: NOT DETECTED
CREATININE URINE: 91.1 MG/DL (ref 20–400)
DIAZEPAM, URINE: NOT DETECTED
DRUGS EXPECTED, UR: NORMAL
EER HI RES INTERP UR: NORMAL
ETHYL GLUCURONIDE UR: NOT DETECTED
FENTANYL URINE: NOT DETECTED
GABAPENTIN: PRESENT
HYDROCODONE, URINE: NOT DETECTED
HYDROMORPHONE, URINE: NOT DETECTED
LORAZEPAM, URINE: NOT DETECTED
MARIJUANA METAB, UR: NOT DETECTED
MDA, UR: NOT DETECTED
MDEA, EVE, UR: NOT DETECTED
MDMA URINE: NOT DETECTED
MEPERIDINE METAB, UR: NOT DETECTED
METHADONE, URINE: NOT DETECTED
METHAMPHETAMINE, URINE: NOT DETECTED
METHYLPHENIDATE: NOT DETECTED
MIDAZOLAM, URINE: NOT DETECTED
MORPHINE URINE: NOT DETECTED
NALOXONE URINE: PRESENT
NORBUPRENORPHINE, URINE: PRESENT
NORDIAZEPAM, URINE: NOT DETECTED
NORFENTANYL, URINE: NOT DETECTED
NORHYDROCODONE, URINE: NOT DETECTED
NOROXYCODONE, URINE: NOT DETECTED
NOROXYMORPHONE, URINE: NOT DETECTED
OXAZEPAM, URINE: NOT DETECTED
OXYCODONE URINE: NOT DETECTED
OXYMORPHONE, URINE: NOT DETECTED
PAIN MANAGEMENT DRUG PANEL INTERP, URINE: NORMAL
PAIN MGT DRUG PANEL, HI RES, UR: NORMAL
PCP,URINE: NOT DETECTED
PHENTERMINE, UR: NOT DETECTED
PREGABALIN: NOT DETECTED
TAPENTADOL, URINE: NOT DETECTED
TAPENTADOL-O-SULFATE, URINE: NOT DETECTED
TEMAZEPAM, URINE: NOT DETECTED
TRAMADOL, URINE: NOT DETECTED
ZOLPIDEM METABOLITE (ZCA), URINE: NOT DETECTED
ZOLPIDEM, URINE: NOT DETECTED

## 2023-03-24 NOTE — RESULT ENCOUNTER NOTE
Austin comment sent to patient.   Urine drug screen consistent with treatment      Future Appointments  3/28/2023  10:45 AM   [facility] RM                 [facility] MRI            [facility]  4/10/2023  2:00 PM    MD REYES Soria PN         [facility]  5/8/2023   2:00 PM    MD REYES Soria PN         [address]  6/6/2023   9:00 AM    TABITHA Russell - CNP    [address] [facility]  7/20/2023  9:30 AM    Caleb Corbett MD     [facility]               [facility]

## 2023-03-29 ENCOUNTER — PATIENT MESSAGE (OUTPATIENT)
Dept: FAMILY MEDICINE CLINIC | Age: 43
End: 2023-03-29

## 2023-03-29 DIAGNOSIS — I10 ESSENTIAL HYPERTENSION: ICD-10-CM

## 2023-03-29 RX ORDER — LISINOPRIL 20 MG/1
20 TABLET ORAL DAILY
Qty: 90 TABLET | Refills: 1 | Status: SHIPPED | OUTPATIENT
Start: 2023-03-29

## 2023-03-29 NOTE — TELEPHONE ENCOUNTER
From: America Bumpers  To: Dr. Fox Medici: 3/29/2023 8:17 AM EDT  Subject: Blood pressure    Good morning. My blood pressure is running a little high. 148/ 93. And then 137/98. I am out of my medication. Lisinopril. I had to double it a few days. Insurance wont cover it till April 6th. Can you send me something into pharmacy?

## 2023-03-31 DIAGNOSIS — G89.29 CHRONIC PAIN OF RIGHT KNEE: ICD-10-CM

## 2023-03-31 DIAGNOSIS — G89.29 CHRONIC NECK PAIN: ICD-10-CM

## 2023-03-31 DIAGNOSIS — M54.2 CHRONIC NECK PAIN: ICD-10-CM

## 2023-03-31 DIAGNOSIS — G89.29 CHRONIC MIDLINE LOW BACK PAIN WITH RIGHT-SIDED SCIATICA: ICD-10-CM

## 2023-03-31 DIAGNOSIS — M54.41 CHRONIC MIDLINE LOW BACK PAIN WITH RIGHT-SIDED SCIATICA: ICD-10-CM

## 2023-03-31 DIAGNOSIS — M25.561 CHRONIC PAIN OF RIGHT KNEE: ICD-10-CM

## 2023-04-03 RX ORDER — GABAPENTIN 100 MG/1
100 CAPSULE ORAL 3 TIMES DAILY
Qty: 90 CAPSULE | Refills: 0 | Status: SHIPPED | OUTPATIENT
Start: 2023-04-03 | End: 2023-05-03

## 2023-04-03 NOTE — TELEPHONE ENCOUNTER
Please Approve or Refuse.   Send to Pharmacy per Pt's Request:      Next Visit Date:  7/20/2023   Last Visit Date: 3/17/2023    Hemoglobin A1C (%)   Date Value   03/21/2022 5.1             ( goal A1C is < 7)   BP Readings from Last 3 Encounters:   03/23/23 (!) 144/98   03/17/23 120/80   12/13/22 (!) 149/98          (goal 120/80)  BUN   Date Value Ref Range Status   03/20/2023 6 6 - 20 mg/dL Final     Creatinine   Date Value Ref Range Status   03/20/2023 0.48 (L) 0.50 - 0.90 mg/dL Final     Potassium   Date Value Ref Range Status   03/20/2023 3.7 3.7 - 5.3 mmol/L Final

## 2023-05-01 DIAGNOSIS — G89.29 CHRONIC MIDLINE LOW BACK PAIN WITH RIGHT-SIDED SCIATICA: ICD-10-CM

## 2023-05-01 DIAGNOSIS — M54.2 CHRONIC NECK PAIN: ICD-10-CM

## 2023-05-01 DIAGNOSIS — M25.561 CHRONIC PAIN OF RIGHT KNEE: ICD-10-CM

## 2023-05-01 DIAGNOSIS — M54.41 CHRONIC MIDLINE LOW BACK PAIN WITH RIGHT-SIDED SCIATICA: ICD-10-CM

## 2023-05-01 DIAGNOSIS — G89.29 CHRONIC NECK PAIN: ICD-10-CM

## 2023-05-01 DIAGNOSIS — G89.29 CHRONIC PAIN OF RIGHT KNEE: ICD-10-CM

## 2023-05-01 RX ORDER — GABAPENTIN 100 MG/1
100 CAPSULE ORAL 3 TIMES DAILY
Qty: 90 CAPSULE | Refills: 0 | Status: SHIPPED | OUTPATIENT
Start: 2023-05-01 | End: 2023-05-31

## 2023-05-08 ENCOUNTER — HOSPITAL ENCOUNTER (OUTPATIENT)
Dept: PAIN MANAGEMENT | Facility: CLINIC | Age: 43
Discharge: HOME | End: 2023-05-08
Payer: COMMERCIAL

## 2023-05-08 VITALS
HEIGHT: 68 IN | RESPIRATION RATE: 8 BRPM | SYSTOLIC BLOOD PRESSURE: 140 MMHG | WEIGHT: 157 LBS | OXYGEN SATURATION: 100 % | TEMPERATURE: 97.9 F | HEART RATE: 69 BPM | DIASTOLIC BLOOD PRESSURE: 100 MMHG | BODY MASS INDEX: 23.79 KG/M2

## 2023-05-08 DIAGNOSIS — R52 PAIN MANAGEMENT: ICD-10-CM

## 2023-05-08 DIAGNOSIS — M54.12 CERVICAL RADICULOPATHY: Primary | ICD-10-CM

## 2023-05-08 PROCEDURE — 2580000003 HC RX 258: Performed by: ANESTHESIOLOGY

## 2023-05-08 PROCEDURE — 6360000002 HC RX W HCPCS: Performed by: ANESTHESIOLOGY

## 2023-05-08 PROCEDURE — 62321 NJX INTERLAMINAR CRV/THRC: CPT

## 2023-05-08 PROCEDURE — 6360000004 HC RX CONTRAST MEDICATION: Performed by: ANESTHESIOLOGY

## 2023-05-08 PROCEDURE — 2500000003 HC RX 250 WO HCPCS: Performed by: ANESTHESIOLOGY

## 2023-05-08 RX ORDER — DEXAMETHASONE SODIUM PHOSPHATE 10 MG/ML
INJECTION, SOLUTION INTRAMUSCULAR; INTRAVENOUS
Status: COMPLETED | OUTPATIENT
Start: 2023-05-08 | End: 2023-05-08

## 2023-05-08 RX ORDER — FENTANYL CITRATE 50 UG/ML
INJECTION, SOLUTION INTRAMUSCULAR; INTRAVENOUS
Status: COMPLETED | OUTPATIENT
Start: 2023-05-08 | End: 2023-05-08

## 2023-05-08 RX ORDER — MIDAZOLAM HYDROCHLORIDE 2 MG/2ML
INJECTION, SOLUTION INTRAMUSCULAR; INTRAVENOUS
Status: COMPLETED | OUTPATIENT
Start: 2023-05-08 | End: 2023-05-08

## 2023-05-08 RX ORDER — 0.9 % SODIUM CHLORIDE 0.9 %
INTRAVENOUS SOLUTION INTRAVENOUS CONTINUOUS PRN
Status: COMPLETED | OUTPATIENT
Start: 2023-05-08 | End: 2023-05-08

## 2023-05-08 RX ORDER — LIDOCAINE HYDROCHLORIDE 10 MG/ML
INJECTION, SOLUTION EPIDURAL; INFILTRATION; INTRACAUDAL; PERINEURAL
Status: COMPLETED | OUTPATIENT
Start: 2023-05-08 | End: 2023-05-08

## 2023-05-08 RX ADMIN — FENTANYL CITRATE 50 MCG: 50 INJECTION, SOLUTION INTRAMUSCULAR; INTRAVENOUS at 15:10

## 2023-05-08 RX ADMIN — IOHEXOL 3 ML: 180 INJECTION INTRAVENOUS at 15:11

## 2023-05-08 RX ADMIN — MIDAZOLAM HYDROCHLORIDE 2 MG: 1 INJECTION, SOLUTION INTRAMUSCULAR; INTRAVENOUS at 15:10

## 2023-05-08 RX ADMIN — DEXAMETHASONE SODIUM PHOSPHATE 10 MG: 10 INJECTION, SOLUTION INTRAMUSCULAR; INTRAVENOUS at 15:11

## 2023-05-08 RX ADMIN — SODIUM CHLORIDE 500 ML: 0.9 INJECTION, SOLUTION INTRAVENOUS at 15:10

## 2023-05-08 RX ADMIN — LIDOCAINE HYDROCHLORIDE 5 ML: 10 INJECTION, SOLUTION EPIDURAL; INFILTRATION; INTRACAUDAL at 15:10

## 2023-05-08 ASSESSMENT — PAIN - FUNCTIONAL ASSESSMENT
PAIN_FUNCTIONAL_ASSESSMENT: 0-10
PAIN_FUNCTIONAL_ASSESSMENT: PREVENTS OR INTERFERES SOME ACTIVE ACTIVITIES AND ADLS

## 2023-05-08 ASSESSMENT — PAIN SCALES - GENERAL: PAINLEVEL_OUTOF10: 0

## 2023-05-08 ASSESSMENT — PAIN DESCRIPTION - DESCRIPTORS: DESCRIPTORS: SHARP;THROBBING;ACHING

## 2023-05-08 NOTE — H&P
Pain Pre-Op H&P Note    Lord Cheyanne MD    HPI: Kierra Majors  presents with     Neck pain  Located over the right side of the neck radiates down right arm to the hand with associated intermittent numbness and tingling  Chronic going on for more than 1 year recently completed therapy with limited benefit  Had recent MRI of the neck  No previous neck injection or surgical history  Pain aggravated with neck movement  Denies any loss of bladder or bowel control    Past Medical History:   Diagnosis Date    Acute respiratory failure with hypoxia ([address] 09/30/2022    Adjustment reaction with anxiety and depression 11/17/2022    Advanced maternal age in multigravida 02/10/2021    Allergic rhinitis     Anxiety 02/13/2015    Chronic midline low back pain with right-sided sciatica 05/24/2022    COPD (chronic obstructive pulmonary disease) ([address] 9-    Degeneration of cervical intervertebral disc 12/21/2020    Depression 12/05/2012    Dichorionic diamniotic twin pregnancy, antepartum 02/10/2021    Formatting of this note might be different from the original. Serial growth US  Growth US  - 9/6/20: A cephalic, anterior, eccentric cord insertion, INDERJIT lg pocket 4.14, EFW 340g (59%)  - B transverse head right,posterior, marginal cord insertion, INDERJIT lg pocket 4.05 cm, EFW 319g (39%)   - 5/6 : A cephalic, INDERJIT wnl, EFW 994S (08%)  - B cephalic, INDERJIT wnl, EFW 708L (38%)  [ ] next due 6/3    Essential hypertension 12/21/2020    Gastro-esophageal reflux disease without esophagitis 12/21/2020    Grief 12/15/2022    History of opiate therapy 12/21/2020    Hx of preeclampsia, prior pregnancy, currently pregnant 02/10/2021    Formatting of this note might be different from the original.  4/2019  Delivered at 33 weeks gestation for preeclampsia with severe features.  2/19/2021  CMP Nml     Urine PC Ratio:  0.11    Hypokalemia 9/28/2022    Lumbar radiculopathy, acute 02/27/2013    Maternal antithrombin III deficiency complicating

## 2023-05-08 NOTE — OP NOTE
Preoperative Diagnosis: Cervical disc herniation and radiculitis  Postoperative Diagnosis:  Cervical disc herniation and radiculitis    Procedure Performed:  Cervical epidural steroid injection under fluoroscopy guidance    BLOOD LOSS: NONE    Procedure: The Patient was seen in the preop area, chart was reviewed, informed consent was obtained. Patient was taken to procedure room and was placed in prone position. Vital signs were monitored through out the  Procedure. A time out was completed. The site was prepped and draped in sterile manner. The target point was marked at The interlaminar space at C7/T1 . Skin and deep tissues were anesthetized with 1 % lidocaine. A  19-gauge Tuohy epidural needlele was advanced  under fluoroscopy guidance in AP view. Epidural space was identified using CAMI technique. A 19 G catheter was threaded up in epidural space for 2 levels. Position was confirmed in Lateral view. Then after negative aspiration contrast dye was injected with live fluoroscopy in AP views that showed  spread of the contrast in the epidural space  and no vascular runoff or intrathecal spread. Finally 5 ml of treatment solution containing 4 ml of PF NS and 1 ml of dexamethasone 10 mg / ml was injected. The needle was removed and a Band-Aid was placed over the needle  insertion site. The patient's vital signs remained stable and the patient tolerated the procedure well. SEDATION NOTE:    ASA CLASSIFICATION  2  MP   CLASSIFICATION  2    Moderate intravenous conscious sedation was supervised by Dr. Wileen Halsted  The patient was independently monitored by a Registered Nurse assigned to the Procedure Room  Monitoring included automated blood pressure, continuous EKG, Capnography and continuous pulse oximetry. The detailed Conscious Record is permanently stored in the [place] [id number].      The following is the conscious sedation record;  Start Time:  1508  End times:  1518  Duration:  10

## 2023-05-08 NOTE — DISCHARGE INSTRUCTIONS

## 2023-05-18 DIAGNOSIS — M54.41 CHRONIC MIDLINE LOW BACK PAIN WITH RIGHT-SIDED SCIATICA: ICD-10-CM

## 2023-05-18 DIAGNOSIS — G89.29 CHRONIC PAIN OF RIGHT KNEE: ICD-10-CM

## 2023-05-18 DIAGNOSIS — G89.29 CHRONIC MIDLINE LOW BACK PAIN WITH RIGHT-SIDED SCIATICA: ICD-10-CM

## 2023-05-18 DIAGNOSIS — G89.29 CHRONIC NECK PAIN: ICD-10-CM

## 2023-05-18 DIAGNOSIS — M54.2 CHRONIC NECK PAIN: ICD-10-CM

## 2023-05-18 DIAGNOSIS — M25.561 CHRONIC PAIN OF RIGHT KNEE: ICD-10-CM

## 2023-05-19 RX ORDER — TIZANIDINE 2 MG/1
2-4 TABLET ORAL
Qty: 60 TABLET | Refills: 1 | Status: SHIPPED | OUTPATIENT
Start: 2023-05-19

## 2023-05-19 NOTE — TELEPHONE ENCOUNTER
Please Approve or Refuse.   Send to Pharmacy per Pt's Request: [address]      Next Visit Date:  6/1/2023   Last Visit Date: 3/17/2023    Hemoglobin A1C (%)   Date Value   03/21/2022 5.1             ( goal A1C is < 7)   BP Readings from Last 3 Encounters:   05/08/23 (!) 140/100   03/23/23 (!) 144/98   03/17/23 120/80          (goal 120/80)  BUN   Date Value Ref Range Status   03/20/2023 6 6 - 20 mg/dL Final     Creatinine   Date Value Ref Range Status   03/20/2023 0.48 (L) 0.50 - 0.90 mg/dL Final     Potassium   Date Value Ref Range Status   03/20/2023 3.7 3.7 - 5.3 mmol/L Final

## 2023-06-01 ENCOUNTER — TELEMEDICINE (OUTPATIENT)
Dept: FAMILY MEDICINE CLINIC | Age: 43
End: 2023-06-01
Payer: COMMERCIAL

## 2023-06-01 DIAGNOSIS — F41.1 GENERALIZED ANXIETY DISORDER: ICD-10-CM

## 2023-06-01 DIAGNOSIS — J45.20 MILD INTERMITTENT ASTHMA WITHOUT COMPLICATION: ICD-10-CM

## 2023-06-01 DIAGNOSIS — F33.0 MILD EPISODE OF RECURRENT MAJOR DEPRESSIVE DISORDER (HCC): ICD-10-CM

## 2023-06-01 DIAGNOSIS — F43.10 PTSD (POST-TRAUMATIC STRESS DISORDER): ICD-10-CM

## 2023-06-01 DIAGNOSIS — D64.9 ANEMIA, UNSPECIFIED TYPE: ICD-10-CM

## 2023-06-01 DIAGNOSIS — M54.41 CHRONIC MIDLINE LOW BACK PAIN WITH RIGHT-SIDED SCIATICA: ICD-10-CM

## 2023-06-01 DIAGNOSIS — J30.89 SEASONAL ALLERGIC RHINITIS DUE TO OTHER ALLERGIC TRIGGER: ICD-10-CM

## 2023-06-01 DIAGNOSIS — I10 ESSENTIAL HYPERTENSION: Primary | ICD-10-CM

## 2023-06-01 DIAGNOSIS — G89.29 CHRONIC MIDLINE LOW BACK PAIN WITH RIGHT-SIDED SCIATICA: ICD-10-CM

## 2023-06-01 PROCEDURE — 99214 OFFICE O/P EST MOD 30 MIN: CPT | Performed by: FAMILY MEDICINE

## 2023-06-01 PROCEDURE — G8427 DOCREV CUR MEDS BY ELIG CLIN: HCPCS | Performed by: FAMILY MEDICINE

## 2023-06-01 RX ORDER — LORATADINE 10 MG/1
10 TABLET ORAL DAILY
Qty: 90 TABLET | Refills: 3 | Status: SHIPPED | OUTPATIENT
Start: 2023-06-01

## 2023-06-01 RX ORDER — LIDOCAINE 40 MG/G
CREAM TOPICAL
Qty: 120 G | Refills: 3 | Status: SHIPPED | OUTPATIENT
Start: 2023-06-01

## 2023-06-01 RX ORDER — GABAPENTIN 300 MG/1
300 CAPSULE ORAL 3 TIMES DAILY
Qty: 90 CAPSULE | Refills: 0 | Status: SHIPPED | OUTPATIENT
Start: 2023-06-01 | End: 2023-07-01

## 2023-06-01 RX ORDER — FLUTICASONE PROPIONATE 50 MCG
2 SPRAY, SUSPENSION (ML) NASAL DAILY
Qty: 48 G | Refills: 1 | Status: SHIPPED | OUTPATIENT
Start: 2023-06-01

## 2023-06-01 RX ORDER — LISINOPRIL 30 MG/1
30 TABLET ORAL DAILY
Qty: 90 TABLET | Refills: 0 | Status: SHIPPED | OUTPATIENT
Start: 2023-06-01

## 2023-06-01 RX ORDER — METHYLPREDNISOLONE 4 MG/1
TABLET ORAL
Qty: 1 KIT | Refills: 0 | Status: SHIPPED | OUTPATIENT
Start: 2023-06-01

## 2023-06-01 SDOH — ECONOMIC STABILITY: FOOD INSECURITY: WITHIN THE PAST 12 MONTHS, THE FOOD YOU BOUGHT JUST DIDN'T LAST AND YOU DIDN'T HAVE MONEY TO GET MORE.: NEVER TRUE

## 2023-06-01 SDOH — ECONOMIC STABILITY: FOOD INSECURITY: WITHIN THE PAST 12 MONTHS, YOU WORRIED THAT YOUR FOOD WOULD RUN OUT BEFORE YOU GOT MONEY TO BUY MORE.: NEVER TRUE

## 2023-06-01 SDOH — ECONOMIC STABILITY: INCOME INSECURITY: HOW HARD IS IT FOR YOU TO PAY FOR THE VERY BASICS LIKE FOOD, HOUSING, MEDICAL CARE, AND HEATING?: NOT HARD AT ALL

## 2023-06-01 ASSESSMENT — ENCOUNTER SYMPTOMS
DIARRHEA: 0
ABDOMINAL DISTENTION: 0
CHEST TIGHTNESS: 0
COUGH: 0
NAUSEA: 0
WHEEZING: 0
SHORTNESS OF BREATH: 1
CONSTIPATION: 0
VOMITING: 0
ABDOMINAL PAIN: 0
BACK PAIN: 1

## 2023-06-01 ASSESSMENT — PATIENT HEALTH QUESTIONNAIRE - PHQ9
SUM OF ALL RESPONSES TO PHQ QUESTIONS 1-9: 0
2. FEELING DOWN, DEPRESSED OR HOPELESS: 0
SUM OF ALL RESPONSES TO PHQ QUESTIONS 1-9: 0
1. LITTLE INTEREST OR PLEASURE IN DOING THINGS: 0
SUM OF ALL RESPONSES TO PHQ9 QUESTIONS 1 & 2: 0

## 2023-06-05 PROBLEM — F43.10 PTSD (POST-TRAUMATIC STRESS DISORDER): Status: ACTIVE | Noted: 2023-06-05

## 2023-06-05 PROBLEM — F33.0 MILD EPISODE OF RECURRENT MAJOR DEPRESSIVE DISORDER (HCC): Status: ACTIVE | Noted: 2023-06-05

## 2023-06-05 RX ORDER — FLUOXETINE HYDROCHLORIDE 20 MG/1
20 CAPSULE ORAL DAILY
Qty: 30 CAPSULE | Refills: 0
Start: 2023-06-05

## 2023-06-05 ASSESSMENT — ENCOUNTER SYMPTOMS
SINUS PAIN: 0
RHINORRHEA: 1
SINUS PRESSURE: 0

## 2023-06-06 ENCOUNTER — HOSPITAL ENCOUNTER (OUTPATIENT)
Age: 43
Discharge: HOME | End: 2023-06-06
Payer: COMMERCIAL

## 2023-06-06 ENCOUNTER — HOSPITAL ENCOUNTER (OUTPATIENT)
Dept: PAIN MANAGEMENT | Age: 43
Discharge: HOME | End: 2023-06-06
Payer: COMMERCIAL

## 2023-06-06 VITALS
TEMPERATURE: 97.3 F | OXYGEN SATURATION: 98 % | BODY MASS INDEX: 23.79 KG/M2 | HEART RATE: 68 BPM | WEIGHT: 157 LBS | HEIGHT: 68 IN | RESPIRATION RATE: 20 BRPM | SYSTOLIC BLOOD PRESSURE: 128 MMHG | DIASTOLIC BLOOD PRESSURE: 95 MMHG

## 2023-06-06 DIAGNOSIS — M54.41 CHRONIC MIDLINE LOW BACK PAIN WITH RIGHT-SIDED SCIATICA: Primary | ICD-10-CM

## 2023-06-06 DIAGNOSIS — M54.2 CHRONIC NECK PAIN: ICD-10-CM

## 2023-06-06 DIAGNOSIS — G89.29 CHRONIC NECK PAIN: ICD-10-CM

## 2023-06-06 DIAGNOSIS — M50.30 DEGENERATION OF CERVICAL INTERVERTEBRAL DISC: ICD-10-CM

## 2023-06-06 DIAGNOSIS — D64.9 ANEMIA, UNSPECIFIED TYPE: ICD-10-CM

## 2023-06-06 DIAGNOSIS — I10 ESSENTIAL HYPERTENSION: ICD-10-CM

## 2023-06-06 DIAGNOSIS — M54.16 RIGHT LUMBAR RADICULITIS: ICD-10-CM

## 2023-06-06 DIAGNOSIS — G89.29 CHRONIC MIDLINE LOW BACK PAIN WITH RIGHT-SIDED SCIATICA: Primary | ICD-10-CM

## 2023-06-06 LAB
ALBUMIN SERPL-MCNC: 3.4 G/DL (ref 3.5–5.2)
ALP SERPL-CCNC: 50 U/L (ref 35–104)
ALT SERPL-CCNC: 8 U/L (ref 5–33)
ANION GAP SERPL CALCULATED.3IONS-SCNC: 7 MMOL/L (ref 9–17)
AST SERPL-CCNC: 17 U/L
BACTERIA URNS QL MICRO: NORMAL
BASOPHILS # BLD: 0 K/UL (ref 0–0.2)
BASOPHILS NFR BLD: 0 % (ref 0–2)
BILIRUB SERPL-MCNC: 0.4 MG/DL (ref 0.3–1.2)
BILIRUB UR QL STRIP: NEGATIVE
BUN SERPL-MCNC: 6 MG/DL (ref 6–20)
CALCIUM SERPL-MCNC: 8.7 MG/DL (ref 8.6–10.4)
CASTS #/AREA URNS LPF: NORMAL /LPF
CHLORIDE SERPL-SCNC: 106 MMOL/L (ref 98–107)
CLARITY UR: CLEAR
CO2 SERPL-SCNC: 28 MMOL/L (ref 20–31)
COLOR UR: YELLOW
CREAT SERPL-MCNC: 0.58 MG/DL (ref 0.5–0.9)
EOSINOPHIL # BLD: 0.1 K/UL (ref 0–0.4)
EOSINOPHILS RELATIVE PERCENT: 1 % (ref 0–4)
EPI CELLS #/AREA URNS HPF: NORMAL /HPF
ERYTHROCYTE [DISTWIDTH] IN BLOOD BY AUTOMATED COUNT: 14 % (ref 11.5–14.9)
FERRITIN SERPL-MCNC: 43 NG/ML (ref 13–150)
FOLATE SERPL-MCNC: >20 NG/ML
GFR SERPL CREATININE-BSD FRML MDRD: >60 ML/MIN/1.73M2
GLUCOSE SERPL-MCNC: 74 MG/DL (ref 70–99)
GLUCOSE UR STRIP-MCNC: NEGATIVE MG/DL
HCT VFR BLD AUTO: 37.8 % (ref 36–46)
HGB BLD-MCNC: 12.6 G/DL (ref 12–16)
HGB UR QL STRIP.AUTO: ABNORMAL
IRON SATN MFR SERPL: 45 % (ref 20–55)
IRON SERPL-MCNC: 116 UG/DL (ref 37–145)
KETONES UR STRIP-MCNC: NEGATIVE MG/DL
LEUKOCYTE ESTERASE UR QL STRIP: NEGATIVE
LYMPHOCYTES # BLD: 16 % (ref 24–44)
LYMPHOCYTES NFR BLD: 1.3 K/UL (ref 1–4.8)
MAGNESIUM SERPL-MCNC: 2.1 MG/DL (ref 1.6–2.6)
MCH RBC QN AUTO: 31 PG (ref 26–34)
MCHC RBC AUTO-ENTMCNC: 33.2 G/DL (ref 31–37)
MCV RBC AUTO: 93.2 FL (ref 80–100)
MONOCYTES NFR BLD: 0.3 K/UL (ref 0.1–1.3)
MONOCYTES NFR BLD: 4 % (ref 1–7)
NEUTROPHILS NFR BLD: 79 % (ref 36–66)
NEUTS SEG NFR BLD: 6.3 K/UL (ref 1.3–9.1)
NITRITE UR QL STRIP: NEGATIVE
PH UR STRIP: 8 [PH] (ref 5–8)
PLATELET # BLD AUTO: 330 K/UL (ref 150–450)
PMV BLD AUTO: 8.8 FL (ref 6–12)
POTASSIUM SERPL-SCNC: 4.2 MMOL/L (ref 3.7–5.3)
PROT SERPL-MCNC: 6.1 G/DL (ref 6.4–8.3)
PROT UR STRIP-MCNC: NEGATIVE MG/DL
RBC # BLD AUTO: 4.06 M/UL (ref 4–5.2)
RBC #/AREA URNS HPF: NORMAL /HPF
SODIUM SERPL-SCNC: 141 MMOL/L (ref 135–144)
SP GR UR STRIP: 1.01 (ref 1–1.03)
TIBC SERPL-MCNC: 255 UG/DL (ref 250–450)
TSH SERPL-MCNC: 1.28 UIU/ML (ref 0.3–5)
UNSATURATED IRON BINDING CAPACITY: 139 UG/DL (ref 112–347)
URATE SERPL-MCNC: 4.5 MG/DL (ref 2.4–5.7)
UROBILINOGEN UR STRIP-ACNC: NORMAL
VIT B12 SERPL-MCNC: 317 PG/ML (ref 232–1245)
WBC #/AREA URNS HPF: NORMAL /HPF
WBC OTHER # BLD: 8 K/UL (ref 3.5–11)

## 2023-06-06 PROCEDURE — 81001 URINALYSIS AUTO W/SCOPE: CPT

## 2023-06-06 PROCEDURE — 99214 OFFICE O/P EST MOD 30 MIN: CPT | Performed by: NURSE PRACTITIONER

## 2023-06-06 PROCEDURE — 82728 ASSAY OF FERRITIN: CPT

## 2023-06-06 PROCEDURE — 83550 IRON BINDING TEST: CPT

## 2023-06-06 PROCEDURE — 84550 ASSAY OF BLOOD/URIC ACID: CPT

## 2023-06-06 PROCEDURE — 80053 COMPREHEN METABOLIC PANEL: CPT

## 2023-06-06 PROCEDURE — 85045 AUTOMATED RETICULOCYTE COUNT: CPT

## 2023-06-06 PROCEDURE — 99213 OFFICE O/P EST LOW 20 MIN: CPT

## 2023-06-06 PROCEDURE — 84443 ASSAY THYROID STIM HORMONE: CPT

## 2023-06-06 PROCEDURE — 83735 ASSAY OF MAGNESIUM: CPT

## 2023-06-06 PROCEDURE — 82607 VITAMIN B-12: CPT

## 2023-06-06 PROCEDURE — 85027 COMPLETE CBC AUTOMATED: CPT

## 2023-06-06 PROCEDURE — 36415 COLL VENOUS BLD VENIPUNCTURE: CPT

## 2023-06-06 PROCEDURE — 82746 ASSAY OF FOLIC ACID SERUM: CPT

## 2023-06-06 PROCEDURE — 83540 ASSAY OF IRON: CPT

## 2023-06-06 ASSESSMENT — ENCOUNTER SYMPTOMS
BOWEL INCONTINENCE: 0
SHORTNESS OF BREATH: 0
COUGH: 0
BACK PAIN: 1
CONSTIPATION: 0

## 2023-06-06 NOTE — PROGRESS NOTES
Maternal antithrombin III deficiency complicating pregnancy ([facility] 2021    Formatting of this note might be different from the original. 77% (80- 1328%) - mild deficiency 2021 [ ] Has Hematology appt  (no showed)  High risk thrombophilia Lovenox therapy    Mild intermittent asthma with exacerbation 3/19/2023    Multifocal pneumonia 2022    Opioid use, unspecified with other opioid-induced disorder (HCC)(Suboxone) 2020    Pneumonia due to infectious organism 2022    Pre-eclampsia in third trimester 2021    Formatting of this note might be different from the original. Diagnosed 21 without severe features. P:C 0.32. Plan: weekly labs and visits,  testing.  21: f/u twice daily BP record []    Protein malnutrition ([facility] 3/24/2022    Sepsis ([facility] 2022    Tachypnea 2022       Past Surgical History:   Procedure Laterality Date     SECTION      TUBAL LIGATION  2022       Allergies   Allergen Reactions    Other      seasonal         Current Outpatient Medications:     FLUoxetine (PROZAC) 20 MG capsule, Take 1 capsule by mouth daily Per psychiatrist, Disp: 30 capsule, Rfl: 0    loratadine (CLARITIN) 10 MG tablet, Take 1 tablet by mouth daily, Disp: 90 tablet, Rfl: 3    fluticasone (FLONASE) 50 MCG/ACT nasal spray, 2 sprays by Each Nostril route daily, Disp: 48 g, Rfl: 1    lisinopril (PRINIVIL;ZESTRIL) 30 MG tablet, Take 1 tablet by mouth daily Dose increased 2023. Goal BP bellow 130/80 and above 115/65, heart rate 56 to 90 bpm, Disp: 90 tablet, Rfl: 0    gabapentin (NEURONTIN) 300 MG capsule, Take 1 capsule by mouth 3 times daily for 30 days.  Dose increased 2023, Disp: 90 capsule, Rfl: 0    lidocaine (LMX) 4 % cream, 5 g topical 2-3 times a day as needed for pain, maximum 20 g/day, Disp: 120 g, Rfl: 3    Camphor-Menthol-Methyl Sal 3.1-16-10 % GEL, Apply every 8 hours as needed for pain, Disp: 113.3 g, Rfl: 3    methylPREDNISolone (MEDROL,

## 2023-06-06 NOTE — RESULT ENCOUNTER NOTE
Please notify patient: Moderate blood in the urine, was she menstruating? If not, then we need to see where the blood is coming in the urine from, usually kidneys  Low protein levels, she needs to eat more proteins, chicken, cottage cheese, diabetes, peanut butter, bread linguist, if she is donating plasma?   Solving anemia  Otherwise labs within normal limits  continue current treatment    Future Appointments  7/5/2023   11:15 AM   Rakesh Saravia MD          [facility]  7/20/2023  9:20 AM    Rakesh Saravia MD          [address] [facility]  7/20/2023  9:30 AM    Venessa Edwards MD     fp sc               [facility]

## 2023-06-07 LAB
IMM RETICS NFR: 12.9 % (ref 2.7–18.3)
PATH REV BLD -IMP: NORMAL
RETIC HEMOGLOBIN: 33.5 PG (ref 28.2–35.7)
RETICS # AUTO: 0.07 M/UL (ref 0.03–0.08)
RETICS/RBC NFR AUTO: 1.8 % (ref 0.5–1.9)
SURGICAL PATHOLOGY REPORT: NORMAL

## 2023-06-07 NOTE — RESULT ENCOUNTER NOTE
[place] comment sent to patient.   Normal blood smear pathology report    Future Appointments  7/5/2023   11:15 AM   Deliliah City, MD          STOANH GAFFNEY PN          [facility]   7/20/2023  9:20 AM    Deliliah City, MD          [address] [facility]  7/20/2023  9:30 AM    Jey Pena MD     fp sc               [facility]

## 2023-06-07 NOTE — RESULT ENCOUNTER NOTE
Noted  Future Appointments  7/5/2023   11:15 AM   Nader Nichole MD          ST MA PN         [facility]  7/20/2023  9:20 AM    Nader Nichole MD          [address] [facility]  7/20/2023  9:30 AM    Mili Blunt MD     [facility]               [facility]

## 2023-07-05 ENCOUNTER — HOSPITAL ENCOUNTER (OUTPATIENT)
Dept: PAIN MANAGEMENT | Facility: CLINIC | Age: 43
Discharge: HOME | End: 2023-07-05
Payer: COMMERCIAL

## 2023-07-05 VITALS
WEIGHT: 154 LBS | BODY MASS INDEX: 23.34 KG/M2 | OXYGEN SATURATION: 96 % | DIASTOLIC BLOOD PRESSURE: 98 MMHG | RESPIRATION RATE: 7 BRPM | HEART RATE: 59 BPM | TEMPERATURE: 97.3 F | SYSTOLIC BLOOD PRESSURE: 146 MMHG | HEIGHT: 68 IN

## 2023-07-05 DIAGNOSIS — R52 PAIN MANAGEMENT: ICD-10-CM

## 2023-07-05 DIAGNOSIS — M54.16 RIGHT LUMBAR RADICULITIS: Primary | ICD-10-CM

## 2023-07-05 PROCEDURE — 6360000002 HC RX W HCPCS: Performed by: ANESTHESIOLOGY

## 2023-07-05 PROCEDURE — 2500000003 HC RX 250 WO HCPCS: Performed by: ANESTHESIOLOGY

## 2023-07-05 PROCEDURE — 64483 NJX AA&/STRD TFRM EPI L/S 1: CPT

## 2023-07-05 PROCEDURE — 6360000004 HC RX CONTRAST MEDICATION: Performed by: ANESTHESIOLOGY

## 2023-07-05 RX ORDER — ACETAMINOPHEN 325 MG/1
650 TABLET ORAL EVERY 6 HOURS PRN
COMMUNITY

## 2023-07-05 RX ORDER — FENTANYL CITRATE 50 UG/ML
INJECTION, SOLUTION INTRAMUSCULAR; INTRAVENOUS
Status: COMPLETED | OUTPATIENT
Start: 2023-07-05 | End: 2023-07-05

## 2023-07-05 RX ORDER — DEXAMETHASONE SODIUM PHOSPHATE 10 MG/ML
INJECTION, SOLUTION INTRAMUSCULAR; INTRAVENOUS
Status: COMPLETED | OUTPATIENT
Start: 2023-07-05 | End: 2023-07-05

## 2023-07-05 RX ORDER — LIDOCAINE HYDROCHLORIDE 10 MG/ML
INJECTION, SOLUTION EPIDURAL; INFILTRATION; INTRACAUDAL; PERINEURAL
Status: COMPLETED | OUTPATIENT
Start: 2023-07-05 | End: 2023-07-05

## 2023-07-05 RX ORDER — MIDAZOLAM HYDROCHLORIDE 2 MG/2ML
INJECTION, SOLUTION INTRAMUSCULAR; INTRAVENOUS
Status: COMPLETED | OUTPATIENT
Start: 2023-07-05 | End: 2023-07-05

## 2023-07-05 RX ADMIN — DEXAMETHASONE SODIUM PHOSPHATE 10 MG: 10 INJECTION, SOLUTION INTRAMUSCULAR; INTRAVENOUS at 12:13

## 2023-07-05 RX ADMIN — LIDOCAINE HYDROCHLORIDE 5 ML: 10 INJECTION, SOLUTION EPIDURAL; INFILTRATION; INTRACAUDAL at 12:09

## 2023-07-05 RX ADMIN — MIDAZOLAM HYDROCHLORIDE 2 MG: 1 INJECTION, SOLUTION INTRAMUSCULAR; INTRAVENOUS at 12:09

## 2023-07-05 RX ADMIN — FENTANYL CITRATE 50 MCG: 50 INJECTION, SOLUTION INTRAMUSCULAR; INTRAVENOUS at 12:09

## 2023-07-05 RX ADMIN — IOHEXOL 3 ML: 180 INJECTION INTRAVENOUS at 12:10

## 2023-07-05 ASSESSMENT — PAIN - FUNCTIONAL ASSESSMENT
PAIN_FUNCTIONAL_ASSESSMENT: NONE - DENIES PAIN
PAIN_FUNCTIONAL_ASSESSMENT: PREVENTS OR INTERFERES WITH ALL ACTIVE AND SOME PASSIVE ACTIVITIES
PAIN_FUNCTIONAL_ASSESSMENT: 0-10

## 2023-07-05 ASSESSMENT — PAIN DESCRIPTION - DESCRIPTORS: DESCRIPTORS: SHARP;THROBBING;ACHING

## 2023-07-05 NOTE — DISCHARGE INSTRUCTIONS

## 2023-07-05 NOTE — INTERVAL H&P NOTE
Update History & Physical    The patient's History and Physical of June 6, 2023 was reviewed with the patient and I examined the patient. There was no change. The surgical site was confirmed by the patient and me. Plan: The risks, benefits, expected outcome, and alternative to the recommended procedure have been discussed with the patient. Patient understands and wants to proceed with the procedure.      ASA 2  MP 2  Electronically signed by Michael Coley MD on 7/5/2023 at 11:50 AM

## 2023-07-05 NOTE — OP NOTE
Patient Name: Ofelia Taveras   YOB: 1980  Room/Bed: Room/bed info not found  Medical Record Number: [id number]  Date: 7/5/2023       Preoperative Diagnosis:    1. Right lumbar radiculitis          Postoperative diagnosis:   1. Right lumbar radiculitis          Blood Loss: none    Procedure Performed:  Transforaminal lumbar epidural steroid injection at the levels of L4 AND L5 on the Right side under fluoroscopic guidance. Procedure: The Patient was seen in the preop area, chart was reviewed, informed consent was obtained. Patient was taken to procedure room and was placed in prone position. Vital signs were monitored through out the  Procedure. A time out was completed. The skin over the back was prepped and draped in sterile manner. The target point was marked in ipsilateral obliques just below the pedicle at the target levels. Skin and deep tissues were anesthetized with 1 % lidocaine. A 20-gauge, spinal needle was advanced  under fluoroscopy guidance in AP / Oblique and lateral views, such that the tip of the needle lies in the neuroforamina at about the 6 o'clock position under the pedicle of the target vertebra. This was confirmed with AP and lateral views of the fluoroscopy. Then after negative aspiration contrast dye Omnipaque-180 was injected with live fluoroscopy in AP views that showed  spread of the contrast in the epidural space  and no vascular runoff or intrathecal spread. Finally 3 ml of treatment solution containing 5 ml of  PF NS mixed with 1 ml of dexamethasone 10 mg / ml was injected. The needle was removed and a Band-Aid was placed over the needle  insertion site. The patient's vital signs remained stable and the patient tolerated the procedure well. The same procedure was then repeated at right at L 5level with same technique. The remaining 3 ml of treatment solution was injected at that.  The total dose of steroid injected today was dexamethasone 10

## 2023-07-07 DIAGNOSIS — G89.29 CHRONIC MIDLINE LOW BACK PAIN WITH RIGHT-SIDED SCIATICA: ICD-10-CM

## 2023-07-07 DIAGNOSIS — M54.41 CHRONIC MIDLINE LOW BACK PAIN WITH RIGHT-SIDED SCIATICA: ICD-10-CM

## 2023-07-10 RX ORDER — GABAPENTIN 300 MG/1
300 CAPSULE ORAL 3 TIMES DAILY
Qty: 90 CAPSULE | Refills: 0 | Status: SHIPPED | OUTPATIENT
Start: 2023-07-10 | End: 2023-08-09

## 2023-07-20 ENCOUNTER — OFFICE VISIT (OUTPATIENT)
Dept: FAMILY MEDICINE CLINIC | Age: 43
End: 2023-07-20
Payer: COMMERCIAL

## 2023-07-20 VITALS
HEIGHT: 68 IN | SYSTOLIC BLOOD PRESSURE: 128 MMHG | OXYGEN SATURATION: 98 % | WEIGHT: 152.8 LBS | BODY MASS INDEX: 23.16 KG/M2 | DIASTOLIC BLOOD PRESSURE: 74 MMHG | TEMPERATURE: 97.3 F | HEART RATE: 68 BPM

## 2023-07-20 DIAGNOSIS — M54.41 CHRONIC MIDLINE LOW BACK PAIN WITH RIGHT-SIDED SCIATICA: ICD-10-CM

## 2023-07-20 DIAGNOSIS — I10 ESSENTIAL HYPERTENSION: ICD-10-CM

## 2023-07-20 DIAGNOSIS — Z80.0 FAMILY HISTORY OF COLON CANCER: ICD-10-CM

## 2023-07-20 DIAGNOSIS — E55.9 VITAMIN D DEFICIENCY: ICD-10-CM

## 2023-07-20 DIAGNOSIS — G89.29 CHRONIC NECK PAIN: ICD-10-CM

## 2023-07-20 DIAGNOSIS — Z00.01 ENCOUNTER FOR WELL ADULT EXAM WITH ABNORMAL FINDINGS: Primary | ICD-10-CM

## 2023-07-20 DIAGNOSIS — M25.561 CHRONIC PAIN OF RIGHT KNEE: ICD-10-CM

## 2023-07-20 DIAGNOSIS — Z13.6 SCREENING FOR CARDIOVASCULAR CONDITION: ICD-10-CM

## 2023-07-20 DIAGNOSIS — Z80.3 FAMILY HISTORY OF BREAST CANCER IN MOTHER: ICD-10-CM

## 2023-07-20 DIAGNOSIS — E53.8 VITAMIN B 12 DEFICIENCY: ICD-10-CM

## 2023-07-20 DIAGNOSIS — L70.0 ACNE VULGARIS: ICD-10-CM

## 2023-07-20 DIAGNOSIS — M54.2 CHRONIC NECK PAIN: ICD-10-CM

## 2023-07-20 DIAGNOSIS — G89.29 CHRONIC MIDLINE LOW BACK PAIN WITH RIGHT-SIDED SCIATICA: ICD-10-CM

## 2023-07-20 DIAGNOSIS — N92.0 MENORRHAGIA WITH REGULAR CYCLE: ICD-10-CM

## 2023-07-20 DIAGNOSIS — G89.29 CHRONIC PAIN OF RIGHT KNEE: ICD-10-CM

## 2023-07-20 DIAGNOSIS — Z12.31 ENCOUNTER FOR SCREENING MAMMOGRAM FOR BREAST CANCER: ICD-10-CM

## 2023-07-20 PROBLEM — R52 PAIN, UNSPECIFIED: Status: ACTIVE | Noted: 2023-07-20

## 2023-07-20 PROCEDURE — 3078F DIAST BP <80 MM HG: CPT | Performed by: FAMILY MEDICINE

## 2023-07-20 PROCEDURE — 99396 PREV VISIT EST AGE 40-64: CPT | Performed by: FAMILY MEDICINE

## 2023-07-20 PROCEDURE — G8420 CALC BMI NORM PARAMETERS: HCPCS | Performed by: FAMILY MEDICINE

## 2023-07-20 PROCEDURE — G8427 DOCREV CUR MEDS BY ELIG CLIN: HCPCS | Performed by: FAMILY MEDICINE

## 2023-07-20 PROCEDURE — 99214 OFFICE O/P EST MOD 30 MIN: CPT | Performed by: FAMILY MEDICINE

## 2023-07-20 PROCEDURE — 3074F SYST BP LT 130 MM HG: CPT | Performed by: FAMILY MEDICINE

## 2023-07-20 PROCEDURE — 1036F TOBACCO NON-USER: CPT | Performed by: FAMILY MEDICINE

## 2023-07-20 RX ORDER — ERGOCALCIFEROL 1.25 MG/1
50000 CAPSULE ORAL WEEKLY
Qty: 12 CAPSULE | Refills: 0 | Status: SHIPPED | OUTPATIENT
Start: 2023-07-20

## 2023-07-20 RX ORDER — LISINOPRIL 30 MG/1
30 TABLET ORAL DAILY
Qty: 90 TABLET | Refills: 3 | Status: SHIPPED | OUTPATIENT
Start: 2023-07-20

## 2023-07-20 RX ORDER — LISINOPRIL 20 MG/1
TABLET ORAL
COMMUNITY
Start: 2023-06-01 | End: 2023-07-20 | Stop reason: DRUGHIGH

## 2023-07-20 RX ORDER — TIZANIDINE 2 MG/1
2-4 TABLET ORAL
Qty: 60 TABLET | Refills: 5 | Status: SHIPPED | OUTPATIENT
Start: 2023-07-20

## 2023-07-20 RX ORDER — BENZOYL PEROXIDE 10 G/100G
GEL TOPICAL
Qty: 90 G | Refills: 1 | Status: SHIPPED | OUTPATIENT
Start: 2023-07-20

## 2023-07-20 SDOH — ECONOMIC STABILITY: INCOME INSECURITY: HOW HARD IS IT FOR YOU TO PAY FOR THE VERY BASICS LIKE FOOD, HOUSING, MEDICAL CARE, AND HEATING?: NOT HARD AT ALL

## 2023-07-20 SDOH — ECONOMIC STABILITY: FOOD INSECURITY: WITHIN THE PAST 12 MONTHS, YOU WORRIED THAT YOUR FOOD WOULD RUN OUT BEFORE YOU GOT MONEY TO BUY MORE.: OFTEN TRUE

## 2023-07-20 SDOH — ECONOMIC STABILITY: FOOD INSECURITY: WITHIN THE PAST 12 MONTHS, THE FOOD YOU BOUGHT JUST DIDN'T LAST AND YOU DIDN'T HAVE MONEY TO GET MORE.: OFTEN TRUE

## 2023-07-20 ASSESSMENT — ENCOUNTER SYMPTOMS
ABDOMINAL PAIN: 0
BLOOD IN STOOL: 0
DIARRHEA: 0
WHEEZING: 0
CHEST TIGHTNESS: 0
NAUSEA: 0
BACK PAIN: 1
CONSTIPATION: 0
SHORTNESS OF BREATH: 0
VOMITING: 0
ABDOMINAL DISTENTION: 0
COUGH: 0

## 2023-07-20 ASSESSMENT — VISUAL ACUITY
OD_CC: 20/25
OS_CC: 20/25

## 2023-07-20 ASSESSMENT — PATIENT HEALTH QUESTIONNAIRE - PHQ9
9. THOUGHTS THAT YOU WOULD BE BETTER OFF DEAD, OR OF HURTING YOURSELF: 0
2. FEELING DOWN, DEPRESSED OR HOPELESS: 0
SUM OF ALL RESPONSES TO PHQ QUESTIONS 1-9: 0
1. LITTLE INTEREST OR PLEASURE IN DOING THINGS: 0
7. TROUBLE CONCENTRATING ON THINGS, SUCH AS READING THE NEWSPAPER OR WATCHING TELEVISION: 0
4. FEELING TIRED OR HAVING LITTLE ENERGY: 0
SUM OF ALL RESPONSES TO PHQ QUESTIONS 1-9: 0
SUM OF ALL RESPONSES TO PHQ QUESTIONS 1-9: 0
6. FEELING BAD ABOUT YOURSELF - OR THAT YOU ARE A FAILURE OR HAVE LET YOURSELF OR YOUR FAMILY DOWN: 0
5. POOR APPETITE OR OVEREATING: 0
10. IF YOU CHECKED OFF ANY PROBLEMS, HOW DIFFICULT HAVE THESE PROBLEMS MADE IT FOR YOU TO DO YOUR WORK, TAKE CARE OF THINGS AT HOME, OR GET ALONG WITH OTHER PEOPLE: 0
8. MOVING OR SPEAKING SO SLOWLY THAT OTHER PEOPLE COULD HAVE NOTICED. OR THE OPPOSITE, BEING SO FIGETY OR RESTLESS THAT YOU HAVE BEEN MOVING AROUND A LOT MORE THAN USUAL: 0
SUM OF ALL RESPONSES TO PHQ QUESTIONS 1-9: 0
3. TROUBLE FALLING OR STAYING ASLEEP: 0
SUM OF ALL RESPONSES TO PHQ9 QUESTIONS 1 & 2: 0

## 2023-07-20 NOTE — PROGRESS NOTES
Visit Information    Have you changed or started any medications since your last visit including any over-the-counter medicines, vitamins, or herbal medicines? yes -    Have you stopped taking any of your medications? Is so, why? -  no  Are you having any side effects from any of your medications? - no    Have you seen any other physician or provider since your last visit? yes -    Have you had any other diagnostic tests since your last visit?  no   Have you been seen in the emergency room and/or had an admission in a hospital since we last saw you?  no   Have you had your routine dental cleaning in the past 6 months?  no     Do you have an active MyChart account? If no, what is the barrier?   Yes    Patient Care Team:  Adaline Olszewski, MD as PCP - General (Family Medicine)  Adaline Olszewski, MD as PCP - Empaneled Provider  Gio Lynch MD (Obstetrics & Gynecology)  Kuhshbu Gomez MD as Consulting Physician (Pulmonary Disease)  TABITHA Fisher - CNP as Nurse Practitioner (Nurse Practitioner Family)  Tip aBig MD as Consulting Physician (Pain Management)    Medical History Review  Past Medical, Family, and Social History reviewed and does contribute to the patient presenting condition    Health Maintenance   Topic Date Due    DTaP/Tdap/Td vaccine (1 - Tdap) Never done    Breast cancer screen  05/09/2023    Flu vaccine (1) 08/01/2023    Cervical cancer screen  02/19/2024    Depression Monitoring  06/01/2024    Lipids  03/21/2027    Pneumococcal 0-64 years Vaccine  Completed    COVID-19 Vaccine  Completed    Hepatitis C screen  Completed    HIV screen  Completed    Hepatitis A vaccine  Aged Out    Hib vaccine  Aged Out    Meningococcal (ACWY) vaccine  Aged Out    Varicella vaccine  Discontinued    Diabetes screen  Discontinued
Risk score (Piyush KLEIN, et al., 2019) failed to calculate for the following reasons:    Unable to determine if patient is Non-     Hepatitis C screening- nonreactive   Lab Results   Component Value Date    HEPAIGM NONREACTIVE 10/08/2021    HEPBIGM NONREACTIVE 10/08/2021    HEPCAB NONREACTIVE 10/08/2021            [x]Negative depression screening. PHQ Scores 7/20/2023 6/1/2023 3/17/2023 11/17/2022 10/12/2022 5/24/2022 3/24/2022   PHQ2 Score 0 0 0 1 0 0 0   PHQ9 Score 0 0 2 2 0 0 0       Health Maintenance   Topic Date Due    DTaP/Tdap/Td vaccine (1 - Tdap) Never done    Breast cancer screen  05/09/2023    Flu vaccine (1) 08/01/2023    Cervical cancer screen  02/19/2024    Depression Monitoring  07/20/2024    Lipids  03/21/2027    Pneumococcal 0-64 years Vaccine  Completed    COVID-19 Vaccine  Completed    Hepatitis C screen  Completed    HIV screen  Completed    Hepatitis A vaccine  Aged Out    Hib vaccine  Aged Out    Meningococcal (ACWY) vaccine  Aged Out    Varicella vaccine  Discontinued    Diabetes screen  Discontinued       -rest of complaints with corresponding details per ROS    Review of Systems   Constitutional:  Negative for activity change, appetite change, chills, diaphoresis, fatigue, fever and unexpected weight change. HENT:  Negative for congestion, dental problem and hearing loss. Eyes:  Positive for visual disturbance. Respiratory:  Negative for cough, chest tightness, shortness of breath and wheezing. Cardiovascular:  Positive for leg swelling. Negative for chest pain and palpitations. Gastrointestinal:  Negative for abdominal distention, abdominal pain, blood in stool, constipation, diarrhea, nausea and vomiting. Endocrine: Negative for cold intolerance, heat intolerance, polydipsia, polyphagia and polyuria. Genitourinary:  Positive for menstrual problem. Negative for difficulty urinating, dysuria, frequency, urgency and vaginal pain.    Musculoskeletal:

## 2023-08-07 DIAGNOSIS — B34.9 ACUTE VIRAL SYNDROME: ICD-10-CM

## 2023-08-07 DIAGNOSIS — I10 ESSENTIAL HYPERTENSION: ICD-10-CM

## 2023-08-07 DIAGNOSIS — J45.21 MILD INTERMITTENT ASTHMA WITH EXACERBATION: ICD-10-CM

## 2023-08-07 DIAGNOSIS — M54.2 CHRONIC NECK PAIN: ICD-10-CM

## 2023-08-07 DIAGNOSIS — M25.561 CHRONIC PAIN OF RIGHT KNEE: ICD-10-CM

## 2023-08-07 DIAGNOSIS — G89.29 CHRONIC MIDLINE LOW BACK PAIN WITH RIGHT-SIDED SCIATICA: ICD-10-CM

## 2023-08-07 DIAGNOSIS — J30.89 SEASONAL ALLERGIC RHINITIS DUE TO OTHER ALLERGIC TRIGGER: ICD-10-CM

## 2023-08-07 DIAGNOSIS — G89.29 CHRONIC NECK PAIN: ICD-10-CM

## 2023-08-07 DIAGNOSIS — G89.29 CHRONIC PAIN OF RIGHT KNEE: ICD-10-CM

## 2023-08-07 DIAGNOSIS — M54.41 CHRONIC MIDLINE LOW BACK PAIN WITH RIGHT-SIDED SCIATICA: ICD-10-CM

## 2023-08-07 RX ORDER — LISINOPRIL 30 MG/1
30 TABLET ORAL DAILY
Qty: 90 TABLET | Refills: 3 | Status: SHIPPED | OUTPATIENT
Start: 2023-08-07

## 2023-08-07 RX ORDER — GABAPENTIN 300 MG/1
300 CAPSULE ORAL 3 TIMES DAILY
Qty: 90 CAPSULE | Refills: 0 | Status: SHIPPED | OUTPATIENT
Start: 2023-08-07 | End: 2023-09-06

## 2023-08-07 RX ORDER — FLUTICASONE PROPIONATE 50 MCG
2 SPRAY, SUSPENSION (ML) NASAL DAILY
Qty: 48 G | Refills: 1 | Status: SHIPPED | OUTPATIENT
Start: 2023-08-07

## 2023-08-07 RX ORDER — TIZANIDINE 2 MG/1
2-4 TABLET ORAL
Qty: 60 TABLET | Refills: 5 | Status: SHIPPED | OUTPATIENT
Start: 2023-08-07

## 2023-08-07 RX ORDER — ALBUTEROL SULFATE 90 UG/1
2 AEROSOL, METERED RESPIRATORY (INHALATION) EVERY 6 HOURS PRN
Qty: 8 G | Refills: 0 | Status: SHIPPED | OUTPATIENT
Start: 2023-08-07

## 2023-08-07 RX ORDER — LORATADINE 10 MG/1
10 TABLET ORAL DAILY
Qty: 90 TABLET | Refills: 3 | Status: SHIPPED | OUTPATIENT
Start: 2023-08-07

## 2023-08-07 NOTE — TELEPHONE ENCOUNTER
Please Approve or Refuse.   Send to Pharmacy per Pt's Request:      Next Visit Date:  1/26/2024   Last Visit Date: 7/20/2023    Hemoglobin A1C (%)   Date Value   03/21/2022 5.1             ( goal A1C is < 7)   BP Readings from Last 3 Encounters:   07/20/23 128/74   07/05/23 (!) 146/98   06/06/23 (!) 128/95          (goal 120/80)  BUN   Date Value Ref Range Status   06/06/2023 6 6 - 20 mg/dL Final     Creatinine   Date Value Ref Range Status   06/06/2023 0.58 0.50 - 0.90 mg/dL Final     Potassium   Date Value Ref Range Status   06/06/2023 4.2 3.7 - 5.3 mmol/L Final

## 2023-09-07 DIAGNOSIS — B34.9 ACUTE VIRAL SYNDROME: ICD-10-CM

## 2023-09-07 DIAGNOSIS — E53.8 VITAMIN B 12 DEFICIENCY: ICD-10-CM

## 2023-09-07 DIAGNOSIS — J45.21 MILD INTERMITTENT ASTHMA WITH EXACERBATION: ICD-10-CM

## 2023-09-07 DIAGNOSIS — G89.29 CHRONIC MIDLINE LOW BACK PAIN WITH RIGHT-SIDED SCIATICA: ICD-10-CM

## 2023-09-07 DIAGNOSIS — M25.561 CHRONIC PAIN OF RIGHT KNEE: ICD-10-CM

## 2023-09-07 DIAGNOSIS — M54.2 CHRONIC NECK PAIN: ICD-10-CM

## 2023-09-07 DIAGNOSIS — M54.41 CHRONIC MIDLINE LOW BACK PAIN WITH RIGHT-SIDED SCIATICA: ICD-10-CM

## 2023-09-07 DIAGNOSIS — G89.29 CHRONIC PAIN OF RIGHT KNEE: ICD-10-CM

## 2023-09-07 DIAGNOSIS — G89.29 CHRONIC NECK PAIN: ICD-10-CM

## 2023-09-07 RX ORDER — ALBUTEROL SULFATE 90 UG/1
2 AEROSOL, METERED RESPIRATORY (INHALATION) EVERY 6 HOURS PRN
Qty: 8 G | Refills: 0 | Status: SHIPPED | OUTPATIENT
Start: 2023-09-07

## 2023-09-07 RX ORDER — GABAPENTIN 300 MG/1
300 CAPSULE ORAL 3 TIMES DAILY
Qty: 90 CAPSULE | Refills: 0 | Status: SHIPPED | OUTPATIENT
Start: 2023-09-07 | End: 2023-10-07

## 2023-09-07 RX ORDER — TIZANIDINE 2 MG/1
2-4 TABLET ORAL
Qty: 60 TABLET | Refills: 5 | Status: SHIPPED | OUTPATIENT
Start: 2023-09-07

## 2023-09-20 ENCOUNTER — HOSPITAL ENCOUNTER (OUTPATIENT)
Dept: WOMENS IMAGING | Age: 43
Discharge: HOME | End: 2023-09-22
Attending: FAMILY MEDICINE
Payer: COMMERCIAL

## 2023-09-20 DIAGNOSIS — Z12.31 ENCOUNTER FOR SCREENING MAMMOGRAM FOR BREAST CANCER: ICD-10-CM

## 2023-09-20 DIAGNOSIS — R92.8 ABNORMAL MAMMOGRAM OF RIGHT BREAST: Primary | ICD-10-CM

## 2023-09-20 PROCEDURE — 77063 BREAST TOMOSYNTHESIS BI: CPT

## 2023-09-20 NOTE — RESULT ENCOUNTER NOTE
Please notify patient that mammogram showing some abnormalities on the right side, recommending a diagnostic mammogram to be performed. Order placed. Please get that scheduled.

## 2023-09-28 ENCOUNTER — HOSPITAL ENCOUNTER (OUTPATIENT)
Age: 43
Discharge: HOME | End: 2023-09-28
Payer: COMMERCIAL

## 2023-09-28 DIAGNOSIS — Z00.01 ENCOUNTER FOR WELL ADULT EXAM WITH ABNORMAL FINDINGS: ICD-10-CM

## 2023-09-28 DIAGNOSIS — I10 ESSENTIAL HYPERTENSION: ICD-10-CM

## 2023-09-28 DIAGNOSIS — E53.8 VITAMIN B 12 DEFICIENCY: ICD-10-CM

## 2023-09-28 DIAGNOSIS — Z13.6 SCREENING FOR CARDIOVASCULAR CONDITION: ICD-10-CM

## 2023-09-28 DIAGNOSIS — E55.9 VITAMIN D DEFICIENCY: ICD-10-CM

## 2023-09-28 LAB
25(OH)D3 SERPL-MCNC: 24.3 NG/ML
ALBUMIN SERPL-MCNC: 3.4 G/DL (ref 3.5–5.2)
ALBUMIN/GLOB SERPL: 1.5 {RATIO} (ref 1–2.5)
ALP SERPL-CCNC: 48 U/L (ref 35–104)
ALT SERPL-CCNC: 8 U/L (ref 5–33)
ANION GAP SERPL CALCULATED.3IONS-SCNC: 8 MMOL/L (ref 9–17)
AST SERPL-CCNC: 17 U/L
BILIRUB SERPL-MCNC: 0.3 MG/DL (ref 0.3–1.2)
BUN SERPL-MCNC: 8 MG/DL (ref 6–20)
CALCIUM SERPL-MCNC: 8.4 MG/DL (ref 8.6–10.4)
CHLORIDE SERPL-SCNC: 103 MMOL/L (ref 98–107)
CHOLEST SERPL-MCNC: 202 MG/DL
CHOLESTEROL/HDL RATIO: 3.8
CO2 SERPL-SCNC: 26 MMOL/L (ref 20–31)
CREAT SERPL-MCNC: 0.6 MG/DL (ref 0.5–0.9)
ERYTHROCYTE [DISTWIDTH] IN BLOOD BY AUTOMATED COUNT: 14 % (ref 11.8–14.4)
FOLATE SERPL-MCNC: 9.7 NG/ML (ref 4.8–24.2)
GFR SERPL CREATININE-BSD FRML MDRD: >60 ML/MIN/1.73M2
GLUCOSE SERPL-MCNC: 83 MG/DL (ref 70–99)
HCT VFR BLD AUTO: 37.9 % (ref 36.3–47.1)
HDLC SERPL-MCNC: 53 MG/DL
HGB BLD-MCNC: 12.5 G/DL (ref 11.9–15.1)
LDLC SERPL CALC-MCNC: 124 MG/DL (ref 0–130)
MCH RBC QN AUTO: 31.7 PG (ref 25.2–33.5)
MCHC RBC AUTO-ENTMCNC: 33 G/DL (ref 28.4–34.8)
MCV RBC AUTO: 96.2 FL (ref 82.6–102.9)
NRBC BLD-RTO: 0 PER 100 WBC
PLATELET # BLD AUTO: 202 K/UL (ref 138–453)
PMV BLD AUTO: 10.4 FL (ref 8.1–13.5)
POTASSIUM SERPL-SCNC: 4.2 MMOL/L (ref 3.7–5.3)
PROT SERPL-MCNC: 5.7 G/DL (ref 6.4–8.3)
RBC # BLD AUTO: 3.94 M/UL (ref 3.95–5.11)
SODIUM SERPL-SCNC: 137 MMOL/L (ref 135–144)
TRIGL SERPL-MCNC: 126 MG/DL
URATE SERPL-MCNC: 4.4 MG/DL (ref 2.4–5.7)
VIT B12 SERPL-MCNC: 400 PG/ML (ref 232–1245)
WBC OTHER # BLD: 8 K/UL (ref 3.5–11.3)

## 2023-09-28 PROCEDURE — 82306 VITAMIN D 25 HYDROXY: CPT

## 2023-09-28 PROCEDURE — 80061 LIPID PANEL: CPT

## 2023-09-28 PROCEDURE — 82607 VITAMIN B-12: CPT

## 2023-09-28 PROCEDURE — 36415 COLL VENOUS BLD VENIPUNCTURE: CPT

## 2023-09-28 PROCEDURE — 82746 ASSAY OF FOLIC ACID SERUM: CPT

## 2023-09-28 PROCEDURE — 84550 ASSAY OF BLOOD/URIC ACID: CPT

## 2023-09-28 PROCEDURE — 80053 COMPREHEN METABOLIC PANEL: CPT

## 2023-09-28 PROCEDURE — 85027 COMPLETE CBC AUTOMATED: CPT

## 2023-09-28 RX ORDER — ERGOCALCIFEROL 1.25 MG/1
50000 CAPSULE ORAL WEEKLY
Qty: 12 CAPSULE | Refills: 0 | Status: SHIPPED | OUTPATIENT
Start: 2023-09-28

## 2023-09-29 NOTE — RESULT ENCOUNTER NOTE
Please notify patient: Low vitamin D but improving, I will send high-dose vitamin D to the pharmacy  Calcium borderline low due to low proteins worsening  Is she donating plasma? Apparently she is developing malnutrition due to low proteins, and she must increased the intake of proteins, eat more chicken, cottage cheese, yogurt, turkey, fish, peanut butter, or protein shakes  Cholesterol is worsening  Mild anemia--patient taking the vitamin B12 every day and prenatal vitamin with iron? Does she have heavy menstrual period? --If no heavy menstrual period's then we should consider referring her for EGD colonoscopy    Otherwise labs within normal limits  continue current treatment    Future Appointments  10/5/2023  10:20 AM   Farhana Lyn MD          [address]  10/10/2023 1:30 PM    [facility] DIAG MAMMO RM          [facility] MAMMO          ST Radiolog  10/10/2023 2:00 PM    [address] [facility] RM               [facility] MAMMO          [address] Radiolog  1/26/2024  1:00 PM    MD jermain Pink               [facility]  7/23/2024  9:00 AM    MD jermain Pink

## 2023-10-05 ENCOUNTER — HOSPITAL ENCOUNTER (OUTPATIENT)
Dept: PAIN MANAGEMENT | Age: 43
Discharge: HOME | End: 2023-10-05
Payer: COMMERCIAL

## 2023-10-05 VITALS
SYSTOLIC BLOOD PRESSURE: 110 MMHG | HEART RATE: 65 BPM | BODY MASS INDEX: 23.04 KG/M2 | HEIGHT: 68 IN | OXYGEN SATURATION: 98 % | RESPIRATION RATE: 20 BRPM | WEIGHT: 152 LBS | DIASTOLIC BLOOD PRESSURE: 78 MMHG | TEMPERATURE: 97.3 F

## 2023-10-05 DIAGNOSIS — M54.2 CHRONIC NECK PAIN: ICD-10-CM

## 2023-10-05 DIAGNOSIS — G89.29 CHRONIC MIDLINE LOW BACK PAIN WITH RIGHT-SIDED SCIATICA: Primary | ICD-10-CM

## 2023-10-05 DIAGNOSIS — M54.41 CHRONIC MIDLINE LOW BACK PAIN WITH RIGHT-SIDED SCIATICA: Primary | ICD-10-CM

## 2023-10-05 DIAGNOSIS — G89.29 CHRONIC NECK PAIN: ICD-10-CM

## 2023-10-05 PROCEDURE — 99213 OFFICE O/P EST LOW 20 MIN: CPT

## 2023-10-05 PROCEDURE — 99213 OFFICE O/P EST LOW 20 MIN: CPT | Performed by: ANESTHESIOLOGY

## 2023-10-05 ASSESSMENT — ENCOUNTER SYMPTOMS
DIARRHEA: 0
BACK PAIN: 1
SHORTNESS OF BREATH: 0
COUGH: 0
CHEST TIGHTNESS: 0
SORE THROAT: 0
NAUSEA: 1
RESPIRATORY NEGATIVE: 1
VOMITING: 0
CONSTIPATION: 0
WHEEZING: 0

## 2023-10-09 DIAGNOSIS — M54.41 CHRONIC MIDLINE LOW BACK PAIN WITH RIGHT-SIDED SCIATICA: ICD-10-CM

## 2023-10-09 DIAGNOSIS — G89.29 CHRONIC MIDLINE LOW BACK PAIN WITH RIGHT-SIDED SCIATICA: ICD-10-CM

## 2023-10-09 RX ORDER — GABAPENTIN 300 MG/1
300 CAPSULE ORAL 3 TIMES DAILY
Qty: 90 CAPSULE | Refills: 0 | Status: SHIPPED | OUTPATIENT
Start: 2023-10-09 | End: 2023-11-08

## 2023-10-09 NOTE — TELEPHONE ENCOUNTER
Please Approve or Refuse.   Send to Pharmacy per Pt's Request: [address] [facility]      Next Visit Date:  1/26/2024   Last Visit Date: 7/20/2023    Hemoglobin A1C (%)   Date Value   03/21/2022 5.1             ( goal A1C is < 7)   BP Readings from Last 3 Encounters:   10/05/23 110/78   07/20/23 128/74   07/05/23 (!) 146/98          (goal 120/80)  BUN   Date Value Ref Range Status   09/28/2023 8 6 - 20 mg/dL Final     Creatinine   Date Value Ref Range Status   09/28/2023 0.6 0.5 - 0.9 mg/dL Final     Potassium   Date Value Ref Range Status   09/28/2023 4.2 3.7 - 5.3 mmol/L Final

## 2023-10-10 ENCOUNTER — HOSPITAL ENCOUNTER (OUTPATIENT)
Dept: WOMENS IMAGING | Age: 43
Discharge: HOME | End: 2023-10-12
Payer: COMMERCIAL

## 2023-10-10 DIAGNOSIS — R92.8 ABNORMAL MAMMOGRAM: ICD-10-CM

## 2023-10-10 DIAGNOSIS — R92.8 ABNORMAL MAMMOGRAM OF RIGHT BREAST: ICD-10-CM

## 2023-10-10 PROCEDURE — 76642 ULTRASOUND BREAST LIMITED: CPT

## 2023-10-10 PROCEDURE — G0279 TOMOSYNTHESIS, MAMMO: HCPCS

## 2023-10-10 NOTE — RESULT ENCOUNTER NOTE
Please notify patient: Benign findings on mammogram, re check in 1 year with screening mammogram  Future Appointments  1/26/2024  1:00 PM    MD jermain Hobson               [facility]  7/23/2024  9:00 AM    MD jermain Hobson

## 2023-10-10 NOTE — RESULT ENCOUNTER NOTE
Please notify patient: Benign findings on the ultrasound and mammogram, lipoma, fatty tumor    To do mammogram again in 1 year  Future Appointments  1/26/2024  1:00 PM    Leland Boas, MD fp sc               [facility]  7/23/2024  9:00 AM    Leland Boas, MD fp sc [facility]in

## 2023-10-10 NOTE — RESULT ENCOUNTER NOTE
Please notify patient that the diagnostic mammogram appears benign. Recommendation is 1 year follow-up.

## 2023-10-17 ENCOUNTER — E-VISIT (OUTPATIENT)
Dept: PRIMARY CARE CLINIC | Age: 43
End: 2023-10-17
Payer: COMMERCIAL

## 2023-10-17 DIAGNOSIS — U07.1 COVID: Primary | ICD-10-CM

## 2023-10-17 PROCEDURE — 99422 OL DIG E/M SVC 11-20 MIN: CPT | Performed by: NURSE PRACTITIONER

## 2023-10-17 ASSESSMENT — LIFESTYLE VARIABLES
SMOKING_YEARS: 20
SMOKING_STATUS: NO, BUT I USED TO SMOKE
PACKS_PER_DAY: 2

## 2023-10-17 NOTE — PROGRESS NOTES
Reviewed questionnaire    Reviewed meds/allergies    Dx Covid    Plan Positive home test. Kidney func WNL.  Rx given for paxlovid, follow up with PCP if no improvement    Time spent on visit 11 min

## 2023-10-19 ENCOUNTER — E-VISIT (OUTPATIENT)
Dept: FAMILY MEDICINE CLINIC | Age: 43
End: 2023-10-19
Payer: COMMERCIAL

## 2023-10-19 DIAGNOSIS — U07.1 COVID-19 VIRUS INFECTION: Primary | ICD-10-CM

## 2023-10-19 DIAGNOSIS — U07.1 COVID: Primary | ICD-10-CM

## 2023-10-19 PROCEDURE — 99423 OL DIG E/M SVC 21+ MIN: CPT | Performed by: FAMILY MEDICINE

## 2023-10-19 PROCEDURE — 99999 PR OFFICE/OUTPT VISIT,PROCEDURE ONLY: CPT | Performed by: FAMILY MEDICINE

## 2023-10-19 RX ORDER — IPRATROPIUM BROMIDE 21 UG/1
2 SPRAY, METERED NASAL EVERY 12 HOURS
Qty: 30 ML | Refills: 0 | Status: SHIPPED | OUTPATIENT
Start: 2023-10-19

## 2023-10-19 RX ORDER — BENZONATATE 100 MG/1
100 CAPSULE ORAL 3 TIMES DAILY PRN
Qty: 21 CAPSULE | Refills: 0 | Status: SHIPPED | OUTPATIENT
Start: 2023-10-19 | End: 2023-10-26

## 2023-10-19 ASSESSMENT — LIFESTYLE VARIABLES
SMOKING_YEARS: 20
PACKS_PER_DAY: 2
SMOKING_STATUS: NO, BUT I USED TO SMOKE
SMOKING_YEARS: 20
SMOKING_STATUS: NO, BUT I USED TO SMOKE
PACKS_PER_DAY: 2

## 2023-10-19 NOTE — PROGRESS NOTES
Alejandra Espinoza (1980) initiated an asynchronous digital communication through [address] [place] [address]. Date of service: 10/19/2023     HPI: per patient's questionnaire    EXAM: not applicable    Diagnoses and all orders for this visit:    1. COVID-19 virus infection  Worsening  Prior pharmacy didn't have Paxlovid  - benzonatate (TESSALON PERLES) 100 MG capsule; Take 1 capsule by mouth 3 times daily as needed for Cough  Dispense: 21 capsule; Refill: 0  - nirmatrelvir/ritonavir 300/100 (PAXLOVID) 20 x 150 MG & 10 x 100MG TBPK; Take 3 tablets (two 150 mg nirmatrelvir and one 100 mg ritonavir tablets) by mouth every 12 hours for 5 days. Dispense: 30 tablet; Refill: 0  - ipratropium (ATROVENT) 0.03 % nasal spray; 2 sprays by Each Nostril route in the morning and 2 sprays in the evening. X 10 days for congestion. Dispense: 30 mL; Refill: 0      No orders of the defined types were placed in this encounter. Patient was advised to contact PCP if symptoms worsen or failing to change as expected      Time: EV3 - 21 or more minutes were spent on the digital evaluation and management of this patient.     Electronically signed by Jerri Varela MD on 10/19/23 at 8:57 AM.

## 2023-10-19 NOTE — PROGRESS NOTES
This is a duplicate, patient already submitted an E-visit with just did a few minutes ago  We will cancel this

## 2023-10-29 DIAGNOSIS — G89.29 CHRONIC NECK PAIN: ICD-10-CM

## 2023-10-29 DIAGNOSIS — I10 ESSENTIAL HYPERTENSION: ICD-10-CM

## 2023-10-29 DIAGNOSIS — B34.9 ACUTE VIRAL SYNDROME: ICD-10-CM

## 2023-10-29 DIAGNOSIS — G89.29 CHRONIC MIDLINE LOW BACK PAIN WITH RIGHT-SIDED SCIATICA: ICD-10-CM

## 2023-10-29 DIAGNOSIS — E53.8 VITAMIN B 12 DEFICIENCY: ICD-10-CM

## 2023-10-29 DIAGNOSIS — G89.29 CHRONIC PAIN OF RIGHT KNEE: ICD-10-CM

## 2023-10-29 DIAGNOSIS — E55.9 VITAMIN D DEFICIENCY: ICD-10-CM

## 2023-10-29 DIAGNOSIS — M25.561 CHRONIC PAIN OF RIGHT KNEE: ICD-10-CM

## 2023-10-29 DIAGNOSIS — J30.89 SEASONAL ALLERGIC RHINITIS DUE TO OTHER ALLERGIC TRIGGER: ICD-10-CM

## 2023-10-29 DIAGNOSIS — M54.41 CHRONIC MIDLINE LOW BACK PAIN WITH RIGHT-SIDED SCIATICA: ICD-10-CM

## 2023-10-29 DIAGNOSIS — M54.2 CHRONIC NECK PAIN: ICD-10-CM

## 2023-10-29 DIAGNOSIS — J45.20 MILD INTERMITTENT ASTHMA, UNSPECIFIED WHETHER COMPLICATED: ICD-10-CM

## 2023-10-30 DIAGNOSIS — J45.21 MILD INTERMITTENT ASTHMA WITH EXACERBATION: ICD-10-CM

## 2023-10-30 DIAGNOSIS — B34.9 ACUTE VIRAL SYNDROME: ICD-10-CM

## 2023-10-30 RX ORDER — ERGOCALCIFEROL 1.25 MG/1
50000 CAPSULE ORAL WEEKLY
Qty: 12 CAPSULE | Refills: 0 | Status: SHIPPED | OUTPATIENT
Start: 2023-10-30

## 2023-10-30 RX ORDER — ALBUTEROL SULFATE 90 UG/1
2 AEROSOL, METERED RESPIRATORY (INHALATION) EVERY 6 HOURS PRN
Qty: 8 G | Refills: 0 | Status: SHIPPED | OUTPATIENT
Start: 2023-10-30

## 2023-10-30 RX ORDER — LISINOPRIL 30 MG/1
30 TABLET ORAL DAILY
Qty: 90 TABLET | Refills: 3 | Status: SHIPPED | OUTPATIENT
Start: 2023-10-30

## 2023-10-30 RX ORDER — TIZANIDINE 2 MG/1
2-4 TABLET ORAL
Qty: 60 TABLET | Refills: 5 | Status: SHIPPED | OUTPATIENT
Start: 2023-10-30

## 2023-10-30 RX ORDER — ALBUTEROL SULFATE 90 UG/1
2 AEROSOL, METERED RESPIRATORY (INHALATION) EVERY 6 HOURS PRN
Qty: 6.7 G | Refills: 0 | OUTPATIENT
Start: 2023-10-30

## 2023-10-30 RX ORDER — FLUTICASONE PROPIONATE 50 MCG
2 SPRAY, SUSPENSION (ML) NASAL DAILY
Qty: 48 G | Refills: 1 | Status: SHIPPED | OUTPATIENT
Start: 2023-10-30

## 2023-10-30 NOTE — TELEPHONE ENCOUNTER
Please Approve or Refuse.   Send to Pharmacy per Pt's Request:      Next Visit Date:  1/26/2024   Last Visit Date: 10/19/2023    Hemoglobin A1C (%)   Date Value   03/21/2022 5.1             ( goal A1C is < 7)   BP Readings from Last 3 Encounters:   10/05/23 110/78   07/20/23 128/74   07/05/23 (!) 146/98          (goal 120/80)  BUN   Date Value Ref Range Status   09/28/2023 8 6 - 20 mg/dL Final     Creatinine   Date Value Ref Range Status   09/28/2023 0.6 0.5 - 0.9 mg/dL Final     Potassium   Date Value Ref Range Status   09/28/2023 4.2 3.7 - 5.3 mmol/L Final

## 2024-01-12 ENCOUNTER — OFFICE VISIT (OUTPATIENT)
Dept: NEUROSURGERY | Age: 44
End: 2024-01-12
Payer: COMMERCIAL

## 2024-01-12 VITALS
WEIGHT: 168.2 LBS | HEART RATE: 67 BPM | BODY MASS INDEX: 25.49 KG/M2 | SYSTOLIC BLOOD PRESSURE: 148 MMHG | DIASTOLIC BLOOD PRESSURE: 98 MMHG | HEIGHT: 68 IN

## 2024-01-12 DIAGNOSIS — M47.812 CERVICAL SPONDYLOSIS: Primary | ICD-10-CM

## 2024-01-12 DIAGNOSIS — M47.816 LUMBAR SPONDYLOSIS: ICD-10-CM

## 2024-01-12 PROCEDURE — 99204 OFFICE O/P NEW MOD 45 MIN: CPT | Performed by: NURSE PRACTITIONER

## 2024-01-12 PROCEDURE — 3077F SYST BP >= 140 MM HG: CPT | Performed by: NURSE PRACTITIONER

## 2024-01-12 PROCEDURE — G8484 FLU IMMUNIZE NO ADMIN: HCPCS | Performed by: NURSE PRACTITIONER

## 2024-01-12 PROCEDURE — G8427 DOCREV CUR MEDS BY ELIG CLIN: HCPCS | Performed by: NURSE PRACTITIONER

## 2024-01-12 PROCEDURE — G8419 CALC BMI OUT NRM PARAM NOF/U: HCPCS | Performed by: NURSE PRACTITIONER

## 2024-01-12 PROCEDURE — 1036F TOBACCO NON-USER: CPT | Performed by: NURSE PRACTITIONER

## 2024-01-12 PROCEDURE — 3080F DIAST BP >= 90 MM HG: CPT | Performed by: NURSE PRACTITIONER

## 2024-01-12 NOTE — PROGRESS NOTES
self-injury. The patient is not nervous/anxious.      Physical Exam:      BP (!) 148/98 (Site: Right Upper Arm, Position: Sitting, Cuff Size: Small Adult)   Pulse 67   Ht 1.727 m (5' 8\")   Wt 76.3 kg (168 lb 3.2 oz)   BMI 25.57 kg/m²   Estimated body mass index is 25.57 kg/m² as calculated from the following:    Height as of this encounter: 1.727 m (5' 8\").    Weight as of this encounter: 76.3 kg (168 lb 3.2 oz).    General:  Alejandra Espinoza is a 43 y.o. year old female who appears her stated age.   HEENT: Normocephalic atraumatic. Neck supple.  Chest: regular rate; pulses equal  Abdomen: Soft nontender nondistended.  Ext: DP and PT pulses 2+, good cap refill  Neuro    Mentation  Appropriate affect  Registration intact  Orientation intact  Judgment intact to situation    Cranial Nerves:   Pupils equal and reactive to light  Extraocular motion intact  Face and shrug symmetric  Tongue midline  No dysarthria  v1-3 sensation symmetric, masseter tone symmetric  Hearing symmetric    Sensation: Intact    Motor  L deltoid 5/5; R deltoid 5/5  L biceps 5/5; R biceps 5/5  L triceps 5/5; R triceps 5/5  L wrist extension 5/5; R wrist extension 5/5  L intrinsics 5/5; R intrinsics 5/5     L hip flexion 5/5 , R hip flexion 5/5  L knee extension 5/5; R knee extension 5/5  L Dorsiflexion 5/5; R dorsiflexion 5/5  L Plantarflexion 5/5; R plantarflexion 5/5  L EHL 5/5; R EHL 5/5    Reflexes  L Brachioradialis 2+/4; R brachioradialis 2+/4  L Biceps 2+/4; R Biceps 2+/4  L Triceps 2+/4; R Triceps 2+/4  L Patellar 2+/4: R Patellar 2+/4  L Achilles 2+/4; R Achilles 2+/4    hoffmans L: neg  hoffmans R: neg  Clonus L: neg  Clonus R: neg  Babinski L: neg  Babinski R: neg    Negative straight leg raise bilaterally    Studies Review:     MRI lumbar 3/8/2023:  FINDINGS:  BONES/ALIGNMENT: There is normal alignment of the spine. The vertebral body  heights are maintained. The bone marrow signal appears unremarkable.     SPINAL CORD: The conus

## 2024-02-01 ENCOUNTER — OFFICE VISIT (OUTPATIENT)
Dept: FAMILY MEDICINE CLINIC | Age: 44
End: 2024-02-01
Payer: COMMERCIAL

## 2024-02-01 VITALS
DIASTOLIC BLOOD PRESSURE: 89 MMHG | HEIGHT: 68 IN | BODY MASS INDEX: 26.22 KG/M2 | HEART RATE: 80 BPM | TEMPERATURE: 97.5 F | SYSTOLIC BLOOD PRESSURE: 145 MMHG | WEIGHT: 173 LBS

## 2024-02-01 DIAGNOSIS — G89.29 CHRONIC NECK PAIN: ICD-10-CM

## 2024-02-01 DIAGNOSIS — M54.41 CHRONIC MIDLINE LOW BACK PAIN WITH RIGHT-SIDED SCIATICA: ICD-10-CM

## 2024-02-01 DIAGNOSIS — Z23 ENCOUNTER FOR IMMUNIZATION: ICD-10-CM

## 2024-02-01 DIAGNOSIS — Z23 NEED FOR PROPHYLACTIC VACCINATION AGAINST STREPTOCOCCUS PNEUMONIAE (PNEUMOCOCCUS): ICD-10-CM

## 2024-02-01 DIAGNOSIS — Z51.81 THERAPEUTIC DRUG MONITORING: ICD-10-CM

## 2024-02-01 DIAGNOSIS — M54.2 CHRONIC NECK PAIN: ICD-10-CM

## 2024-02-01 DIAGNOSIS — M25.561 CHRONIC PAIN OF RIGHT KNEE: ICD-10-CM

## 2024-02-01 DIAGNOSIS — D64.9 ANEMIA, UNSPECIFIED TYPE: ICD-10-CM

## 2024-02-01 DIAGNOSIS — G89.29 CHRONIC PAIN OF RIGHT KNEE: ICD-10-CM

## 2024-02-01 DIAGNOSIS — E55.9 VITAMIN D DEFICIENCY: ICD-10-CM

## 2024-02-01 DIAGNOSIS — G89.29 CHRONIC MIDLINE LOW BACK PAIN WITH RIGHT-SIDED SCIATICA: ICD-10-CM

## 2024-02-01 DIAGNOSIS — Z23 NEED FOR TDAP VACCINATION: ICD-10-CM

## 2024-02-01 DIAGNOSIS — I10 ESSENTIAL HYPERTENSION: Primary | ICD-10-CM

## 2024-02-01 DIAGNOSIS — E53.8 VITAMIN B 12 DEFICIENCY: ICD-10-CM

## 2024-02-01 PROCEDURE — 1036F TOBACCO NON-USER: CPT | Performed by: NURSE PRACTITIONER

## 2024-02-01 PROCEDURE — G8482 FLU IMMUNIZE ORDER/ADMIN: HCPCS | Performed by: NURSE PRACTITIONER

## 2024-02-01 PROCEDURE — 3079F DIAST BP 80-89 MM HG: CPT | Performed by: NURSE PRACTITIONER

## 2024-02-01 PROCEDURE — 90677 PCV20 VACCINE IM: CPT | Performed by: NURSE PRACTITIONER

## 2024-02-01 PROCEDURE — G8419 CALC BMI OUT NRM PARAM NOF/U: HCPCS | Performed by: NURSE PRACTITIONER

## 2024-02-01 PROCEDURE — 3077F SYST BP >= 140 MM HG: CPT | Performed by: NURSE PRACTITIONER

## 2024-02-01 PROCEDURE — 90472 IMMUNIZATION ADMIN EACH ADD: CPT | Performed by: NURSE PRACTITIONER

## 2024-02-01 PROCEDURE — G8427 DOCREV CUR MEDS BY ELIG CLIN: HCPCS | Performed by: NURSE PRACTITIONER

## 2024-02-01 PROCEDURE — 90471 IMMUNIZATION ADMIN: CPT | Performed by: NURSE PRACTITIONER

## 2024-02-01 PROCEDURE — 90674 CCIIV4 VAC NO PRSV 0.5 ML IM: CPT | Performed by: NURSE PRACTITIONER

## 2024-02-01 PROCEDURE — 99214 OFFICE O/P EST MOD 30 MIN: CPT | Performed by: NURSE PRACTITIONER

## 2024-02-01 RX ORDER — LISINOPRIL 30 MG/1
30 TABLET ORAL DAILY
Qty: 90 TABLET | Refills: 3 | Status: SHIPPED | OUTPATIENT
Start: 2024-02-01

## 2024-02-01 RX ORDER — ERGOCALCIFEROL 1.25 MG/1
50000 CAPSULE ORAL WEEKLY
Qty: 12 CAPSULE | Refills: 0 | Status: SHIPPED | OUTPATIENT
Start: 2024-02-01

## 2024-02-01 RX ORDER — GABAPENTIN 300 MG/1
300 CAPSULE ORAL 3 TIMES DAILY
Qty: 90 CAPSULE | Refills: 0 | Status: SHIPPED | OUTPATIENT
Start: 2024-02-01 | End: 2024-03-02

## 2024-02-01 RX ORDER — TIZANIDINE 2 MG/1
2-4 TABLET ORAL
Qty: 60 TABLET | Refills: 5 | Status: SHIPPED | OUTPATIENT
Start: 2024-02-01

## 2024-02-01 SDOH — ECONOMIC STABILITY: FOOD INSECURITY: WITHIN THE PAST 12 MONTHS, THE FOOD YOU BOUGHT JUST DIDN'T LAST AND YOU DIDN'T HAVE MONEY TO GET MORE.: NEVER TRUE

## 2024-02-01 SDOH — ECONOMIC STABILITY: FOOD INSECURITY: WITHIN THE PAST 12 MONTHS, YOU WORRIED THAT YOUR FOOD WOULD RUN OUT BEFORE YOU GOT MONEY TO BUY MORE.: NEVER TRUE

## 2024-02-01 SDOH — ECONOMIC STABILITY: INCOME INSECURITY: HOW HARD IS IT FOR YOU TO PAY FOR THE VERY BASICS LIKE FOOD, HOUSING, MEDICAL CARE, AND HEATING?: NOT HARD AT ALL

## 2024-02-01 ASSESSMENT — PATIENT HEALTH QUESTIONNAIRE - PHQ9
9. THOUGHTS THAT YOU WOULD BE BETTER OFF DEAD, OR OF HURTING YOURSELF: 0
8. MOVING OR SPEAKING SO SLOWLY THAT OTHER PEOPLE COULD HAVE NOTICED. OR THE OPPOSITE, BEING SO FIGETY OR RESTLESS THAT YOU HAVE BEEN MOVING AROUND A LOT MORE THAN USUAL: 0
SUM OF ALL RESPONSES TO PHQ QUESTIONS 1-9: 0
SUM OF ALL RESPONSES TO PHQ9 QUESTIONS 1 & 2: 0
5. POOR APPETITE OR OVEREATING: 0
3. TROUBLE FALLING OR STAYING ASLEEP: 0
7. TROUBLE CONCENTRATING ON THINGS, SUCH AS READING THE NEWSPAPER OR WATCHING TELEVISION: 0
SUM OF ALL RESPONSES TO PHQ QUESTIONS 1-9: 0
SUM OF ALL RESPONSES TO PHQ QUESTIONS 1-9: 0
1. LITTLE INTEREST OR PLEASURE IN DOING THINGS: 0
4. FEELING TIRED OR HAVING LITTLE ENERGY: 0
SUM OF ALL RESPONSES TO PHQ QUESTIONS 1-9: 0
6. FEELING BAD ABOUT YOURSELF - OR THAT YOU ARE A FAILURE OR HAVE LET YOURSELF OR YOUR FAMILY DOWN: 0
10. IF YOU CHECKED OFF ANY PROBLEMS, HOW DIFFICULT HAVE THESE PROBLEMS MADE IT FOR YOU TO DO YOUR WORK, TAKE CARE OF THINGS AT HOME, OR GET ALONG WITH OTHER PEOPLE: 0
2. FEELING DOWN, DEPRESSED OR HOPELESS: 0

## 2024-02-01 ASSESSMENT — ENCOUNTER SYMPTOMS
CHEST TIGHTNESS: 0
BLOOD IN STOOL: 0
VOMITING: 0
ABDOMINAL PAIN: 0
ABDOMINAL DISTENTION: 0
SHORTNESS OF BREATH: 0
DIARRHEA: 0
BACK PAIN: 0
CONSTIPATION: 0
NAUSEA: 0
WHEEZING: 0
COUGH: 0

## 2024-02-01 NOTE — PROGRESS NOTES
Visit Information    Have you changed or started any medications since your last visit including any over-the-counter medicines, vitamins, or herbal medicines? no   Are you having any side effects from any of your medications? -  no  Have you stopped taking any of your medications? Is so, why? -  no    Have you seen any other physician or provider since your last visit? No  Have you had any other diagnostic tests since your last visit? No  Have you been seen in the emergency room and/or had an admission to a hospital since we last saw you? No  Have you had your routine dental cleaning in the past 6 months? no    Have you activated your [employer] account? If not, what are your barriers? Yes     Patient Care Team:  Johana Ortega MD as PCP - General (Family Medicine)  Johana Ortega MD as PCP - Empaneled Provider  Karine Kraus MD (Obstetrics & Gynecology)  Cory Unger MD as Consulting Physician (Pulmonary Disease)  Amanda Oconnor APRN - CNP as Nurse Practitioner (Nurse Practitioner Family)  Guido Donahue MD as Consulting Physician (Pain Management)    Medical History Review  Past Medical, Family, and Social History reviewed and does contribute to the patient presenting condition    Health Maintenance   Topic Date Due    Hepatitis B vaccine (1 of 3 - 3-dose series) Never done    DTaP/Tdap/Td vaccine (1 - Tdap) Never done    Flu vaccine (1) 08/01/2023    COVID-19 Vaccine (5 - 2023-24 season) 09/01/2023    Pneumococcal 0-64 years Vaccine (2 - PCV) 10/31/2023    Cervical cancer screen  02/19/2024    Depression Monitoring  07/20/2024    Breast cancer screen  10/10/2024    Lipids  09/28/2028    Hepatitis C screen  Completed    HIV screen  Completed    Hepatitis A vaccine  Aged Out    Hib vaccine  Aged Out    HPV vaccine  Aged Out    Polio vaccine  Aged Out    Meningococcal (ACWY) vaccine  Aged Out    Varicella vaccine  Discontinued    Diabetes screen  Discontinued     
has no major concerns today.  She is in need of some medication refill.  I did educate her that she is due for urine drug screen, I did place an order for this.  I did refill her gabapentin but told her that she needs to get this done before we will refill it again for the next month.  She states understanding and agrees.    She appears ill, malnourished.  She has multiple pick marks on her face, educated to avoid doing this as this may lead to infection.  No active signs of infection noted at this time.  She states overall she feels good and she has been doing well.  She is following up with pain management in regards to her back pain.    She does check her blood pressure at home and states that normally systolic gets 120 to low 130s, she states she is just anxious here.  I asked her to please keep a log and if it is elevated at home to please call our office and let us know, she states that she will do this.  For now no change in regimen.    She had lab work done in September, I did review this with her.  Questions and concerns were addressed.  She denies any major changes in her menstrual cycle, I am going to send referral to GI as she was found to be anemic on most recent lab work.  We discussed diet changes including increasing her protein intake as she appears to be malnourished on the most recent lab work.  Vitamin D was low, I did refill that and told her we can recheck her levels from the spring.     [x]Negative depression screening.   []1-4 = Minimal depression   []5-9 = Mild depression   []10-14 = Moderate depression   []15-19 = Moderately severe depression   []20-27 = Severe depression        2/1/2024    12:36 PM 7/20/2023     9:42 AM 6/1/2023    11:11 AM 3/17/2023     1:26 PM 11/17/2022    10:44 AM 10/12/2022     3:48 PM 5/24/2022    11:01 AM   PHQ Scores   PHQ2 Score 0 0 0 0 1 0 0   PHQ9 Score 0 0 0 2 2 0 0       Review of Systems   Constitutional:  Positive for fatigue. Negative for activity change,

## 2024-03-05 DIAGNOSIS — M25.561 CHRONIC PAIN OF RIGHT KNEE: ICD-10-CM

## 2024-03-05 DIAGNOSIS — G89.29 CHRONIC NECK PAIN: ICD-10-CM

## 2024-03-05 DIAGNOSIS — G89.29 CHRONIC PAIN OF RIGHT KNEE: ICD-10-CM

## 2024-03-05 DIAGNOSIS — G89.29 CHRONIC MIDLINE LOW BACK PAIN WITH RIGHT-SIDED SCIATICA: ICD-10-CM

## 2024-03-05 DIAGNOSIS — M54.2 CHRONIC NECK PAIN: ICD-10-CM

## 2024-03-05 DIAGNOSIS — M54.41 CHRONIC MIDLINE LOW BACK PAIN WITH RIGHT-SIDED SCIATICA: ICD-10-CM

## 2024-03-05 RX ORDER — TIZANIDINE 2 MG/1
TABLET ORAL
Qty: 60 TABLET | Refills: 5 | Status: SHIPPED | OUTPATIENT
Start: 2024-03-05

## 2024-03-05 NOTE — TELEPHONE ENCOUNTER
Please Approve or Refuse.  Send to Pharmacy per Pt's Request:      Next Visit Date:  7/23/2024   Last Visit Date: 2/1/2024    Hemoglobin A1C (%)   Date Value   03/21/2022 5.1             ( goal A1C is < 7)   BP Readings from Last 3 Encounters:   02/01/24 (!) 145/89   01/12/24 (!) 148/98   10/05/23 110/78          (goal 120/80)  BUN   Date Value Ref Range Status   09/28/2023 8 6 - 20 mg/dL Final     Creatinine   Date Value Ref Range Status   09/28/2023 0.6 0.5 - 0.9 mg/dL Final     Potassium   Date Value Ref Range Status   09/28/2023 4.2 3.7 - 5.3 mmol/L Final

## 2024-03-12 ENCOUNTER — TELEPHONE (OUTPATIENT)
Dept: GASTROENTEROLOGY | Age: 44
End: 2024-03-12

## 2024-03-12 NOTE — TELEPHONE ENCOUNTER
attempt 1 - patient was scheduled - pt no showed  attempt 2 - LVM to reschedule appt  attempt 3 - sent letter

## 2024-04-04 ENCOUNTER — HOSPITAL ENCOUNTER (OUTPATIENT)
Age: 44
Discharge: HOME | End: 2024-04-04
Payer: COMMERCIAL

## 2024-04-04 ENCOUNTER — HOSPITAL ENCOUNTER (OUTPATIENT)
Age: 44
Discharge: HOME | End: 2024-04-06
Payer: COMMERCIAL

## 2024-04-04 ENCOUNTER — HOSPITAL ENCOUNTER (OUTPATIENT)
Dept: GENERAL RADIOLOGY | Age: 44
Discharge: HOME | End: 2024-04-06
Payer: COMMERCIAL

## 2024-04-04 DIAGNOSIS — M47.816 LUMBAR SPONDYLOSIS: ICD-10-CM

## 2024-04-04 DIAGNOSIS — Z51.81 THERAPEUTIC DRUG MONITORING: ICD-10-CM

## 2024-04-04 DIAGNOSIS — D64.9 ANEMIA, UNSPECIFIED TYPE: ICD-10-CM

## 2024-04-04 DIAGNOSIS — M47.812 CERVICAL SPONDYLOSIS: ICD-10-CM

## 2024-04-04 DIAGNOSIS — E55.9 VITAMIN D DEFICIENCY: ICD-10-CM

## 2024-04-04 LAB
25(OH)D3 SERPL-MCNC: 22.5 NG/ML (ref 30–100)
ERYTHROCYTE [DISTWIDTH] IN BLOOD BY AUTOMATED COUNT: 15.5 % (ref 11.8–14.4)
FERRITIN SERPL-MCNC: 34 NG/ML (ref 13–150)
HCT VFR BLD AUTO: 35.3 % (ref 36.3–47.1)
HGB BLD-MCNC: 11.2 G/DL (ref 11.9–15.1)
IRON SATN MFR SERPL: 21 % (ref 20–55)
IRON SERPL-MCNC: 76 UG/DL (ref 37–145)
MCH RBC QN AUTO: 30.1 PG (ref 25.2–33.5)
MCHC RBC AUTO-ENTMCNC: 31.7 G/DL (ref 28.4–34.8)
MCV RBC AUTO: 94.9 FL (ref 82.6–102.9)
NRBC BLD-RTO: 0 PER 100 WBC
PLATELET # BLD AUTO: 234 K/UL (ref 138–453)
PMV BLD AUTO: 11.3 FL (ref 8.1–13.5)
RBC # BLD AUTO: 3.72 M/UL (ref 3.95–5.11)
TIBC SERPL-MCNC: 364 UG/DL (ref 250–450)
UNSATURATED IRON BINDING CAPACITY: 288 UG/DL (ref 112–347)
WBC OTHER # BLD: 5.5 K/UL (ref 3.5–11.3)

## 2024-04-04 PROCEDURE — 82306 VITAMIN D 25 HYDROXY: CPT

## 2024-04-04 PROCEDURE — 36415 COLL VENOUS BLD VENIPUNCTURE: CPT

## 2024-04-04 PROCEDURE — 72110 X-RAY EXAM L-2 SPINE 4/>VWS: CPT

## 2024-04-04 PROCEDURE — G0481 DRUG TEST DEF 8-14 CLASSES: HCPCS

## 2024-04-04 PROCEDURE — 83550 IRON BINDING TEST: CPT

## 2024-04-04 PROCEDURE — 82728 ASSAY OF FERRITIN: CPT

## 2024-04-04 PROCEDURE — 83540 ASSAY OF IRON: CPT

## 2024-04-04 PROCEDURE — 85027 COMPLETE CBC AUTOMATED: CPT

## 2024-04-04 PROCEDURE — 72050 X-RAY EXAM NECK SPINE 4/5VWS: CPT

## 2024-04-04 PROCEDURE — 80307 DRUG TEST PRSMV CHEM ANLYZR: CPT

## 2024-04-05 ENCOUNTER — OFFICE VISIT (OUTPATIENT)
Dept: NEUROSURGERY | Age: 44
End: 2024-04-05
Payer: COMMERCIAL

## 2024-04-05 VITALS
DIASTOLIC BLOOD PRESSURE: 98 MMHG | BODY MASS INDEX: 29.19 KG/M2 | HEART RATE: 65 BPM | SYSTOLIC BLOOD PRESSURE: 146 MMHG | HEIGHT: 67 IN | WEIGHT: 186 LBS

## 2024-04-05 DIAGNOSIS — M47.816 LUMBAR SPONDYLOSIS: ICD-10-CM

## 2024-04-05 DIAGNOSIS — M47.812 CERVICAL SPONDYLOSIS: Primary | ICD-10-CM

## 2024-04-05 PROCEDURE — G8419 CALC BMI OUT NRM PARAM NOF/U: HCPCS | Performed by: NURSE PRACTITIONER

## 2024-04-05 PROCEDURE — 99213 OFFICE O/P EST LOW 20 MIN: CPT | Performed by: NURSE PRACTITIONER

## 2024-04-05 PROCEDURE — 3080F DIAST BP >= 90 MM HG: CPT | Performed by: NURSE PRACTITIONER

## 2024-04-05 PROCEDURE — 1036F TOBACCO NON-USER: CPT | Performed by: NURSE PRACTITIONER

## 2024-04-05 PROCEDURE — 3077F SYST BP >= 140 MM HG: CPT | Performed by: NURSE PRACTITIONER

## 2024-04-05 PROCEDURE — G8427 DOCREV CUR MEDS BY ELIG CLIN: HCPCS | Performed by: NURSE PRACTITIONER

## 2024-04-05 NOTE — PROGRESS NOTES
current facility-administered medications on file prior to visit.     Social History     Tobacco Use    Smoking status: Former     Current packs/day: 0.00     Types: Cigarettes     Start date:      Quit date: 2020     Years since quittin.2    Smokeless tobacco: Never   Vaping Use    Vaping Use: Never used   Substance Use Topics    Alcohol use: Not Currently     Comment: social    Drug use: No       Allergies   Allergen Reactions    Other      seasonal       Review of Systems  Constitutional: Negative for activity change and appetite change.   HENT: Negative for ear pain and facial swelling.    Eyes: Negative for discharge and itching.   Respiratory: Negative for choking and chest tightness.    Cardiovascular: Negative for chest pain and leg swelling.   Gastrointestinal: Negative for nausea and abdominal pain.   Endocrine: Negative for cold intolerance and heat intolerance.   Genitourinary: Negative for frequency and flank pain.   Musculoskeletal: Negative for myalgias and joint swelling.   Skin: Negative for rash and wound.   Allergic/Immunologic: Negative for environmental allergies and food allergies.   Hematological: Negative for adenopathy. Does not bruise/bleed easily.   Psychiatric/Behavioral: Negative for self-injury. The patient is not nervous/anxious.      Physical Exam:      BP (!) 146/98   Pulse 65   Ht 1.702 m (5' 7\")   Wt 84.4 kg (186 lb)   BMI 29.13 kg/m²   Estimated body mass index is 29.13 kg/m² as calculated from the following:    Height as of this encounter: 1.702 m (5' 7\").    Weight as of this encounter: 84.4 kg (186 lb).    General:  Alejandra Espinoza is a 43 y.o. year old female who appears her stated age.   HEENT: Normocephalic atraumatic. Neck supple.  Chest: regular rate; pulses equal  Abdomen: Soft nontender nondistended.  Ext: DP and PT pulses 2+, good cap refill  Neuro    Mentation  Appropriate affect  Registration intact  Orientation intact  Judgment intact to

## 2024-04-09 DIAGNOSIS — M54.41 CHRONIC MIDLINE LOW BACK PAIN WITH RIGHT-SIDED SCIATICA: ICD-10-CM

## 2024-04-09 DIAGNOSIS — G89.29 CHRONIC MIDLINE LOW BACK PAIN WITH RIGHT-SIDED SCIATICA: ICD-10-CM

## 2024-04-09 LAB
6-ACETYLMORPHINE, UR: NOT DETECTED
7-AMINOCLONAZEPAM, URINE: NOT DETECTED
ALPHA-OH-ALPRAZ, URINE: NOT DETECTED
ALPHA-OH-MIDAZOLAM, URINE: NOT DETECTED
ALPRAZOLAM, URINE: NOT DETECTED
AMPHETAMINES, URINE: NOT DETECTED
BARBITURATES, URINE: NEGATIVE
BENZOYLECGONINE, UR: NEGATIVE
BUPRENORPHINE URINE: PRESENT
CARISOPRODOL, UR: NEGATIVE
CLONAZEPAM, URINE: NOT DETECTED
CODEINE, URINE: NOT DETECTED
CREAT UR-MCNC: 37.2 MG/DL (ref 20–400)
DIAZEPAM, URINE: NOT DETECTED
DRUGS EXPECTED, UR: NORMAL
EER HI RES INTERP UR: NORMAL
ETHYL GLUCURONIDE UR: NEGATIVE
FENTANYL URINE: NOT DETECTED
GABAPENTIN: PRESENT
HYDROCODONE, URINE: NOT DETECTED
HYDROMORPHONE, URINE: NOT DETECTED
LORAZEPAM, URINE: NOT DETECTED
MARIJUANA METAB, UR: NORMAL
MDA, UR: NOT DETECTED
MDEA, EVE, UR: NOT DETECTED
MDMA URINE: NOT DETECTED
MEPERIDINE METAB, UR: NOT DETECTED
METHADONE, URINE: NEGATIVE
METHAMPHETAMINE, URINE: NOT DETECTED
METHYLPHENIDATE: NOT DETECTED
MIDAZOLAM, URINE: NOT DETECTED
MORPHINE, OPI1M: NOT DETECTED
NALOXONE URINE: PRESENT
NORBUPRENORPHINE, URINE: PRESENT
NORDIAZEPAM, URINE: NOT DETECTED
NORFENTANYL, URINE: NOT DETECTED
NORHYDROCODONE, URINE: NOT DETECTED
NOROXYCODONE, URINE: NOT DETECTED
NOROXYMORPHONE, URINE: NOT DETECTED
OXAZEPAM, URINE: NOT DETECTED
OXYCODONE URINE: NOT DETECTED
OXYMORPHONE, URINE: NOT DETECTED
PAIN MANAGEMENT DRUG PANEL INTERP, URINE: NORMAL
PAIN MGT DRUG PANEL, HI RES, UR: NORMAL
PCP,URINE: NEGATIVE
PHENTERMINE, UR: NOT DETECTED
PREGABALIN: NOT DETECTED
TAPENTADOL, URINE: NOT DETECTED
TAPENTADOL-O-SULFATE, URINE: NOT DETECTED
TEMAZEPAM, URINE: NOT DETECTED
TRAMADOL, URINE: NEGATIVE
ZOLPIDEM METABOLITE (ZCA), URINE: NOT DETECTED
ZOLPIDEM, URINE: NOT DETECTED

## 2024-04-10 RX ORDER — GABAPENTIN 300 MG/1
300 CAPSULE ORAL 3 TIMES DAILY
Qty: 90 CAPSULE | Refills: 0 | Status: SHIPPED | OUTPATIENT
Start: 2024-04-10 | End: 2024-05-10

## 2024-04-10 NOTE — TELEPHONE ENCOUNTER
Noted. Thank you!  Scottt sent to the patient    Future Appointments   Date Time Provider Department Center   6/28/2024  9:30 AM Alexia Luo APRN - CNP [facility]   7/23/2024  9:00 AM [facility]

## 2024-04-10 NOTE — TELEPHONE ENCOUNTER
Please Approve or Refuse.  Send to Pharmacy per Pt's Request:      Next Visit Date:  7/23/2024   Last Visit Date: 2/1/2024    Hemoglobin A1C (%)   Date Value   03/21/2022 5.1             ( goal A1C is < 7)   BP Readings from Last 3 Encounters:   04/05/24 (!) 146/98   02/01/24 (!) 145/89   01/12/24 (!) 148/98          (goal 120/80)  BUN   Date Value Ref Range Status   09/28/2023 8 6 - 20 mg/dL Final     Creatinine   Date Value Ref Range Status   09/28/2023 0.6 0.5 - 0.9 mg/dL Final     Potassium   Date Value Ref Range Status   09/28/2023 4.2 3.7 - 5.3 mmol/L Final

## 2024-04-29 DIAGNOSIS — F41.1 GENERALIZED ANXIETY DISORDER: ICD-10-CM

## 2024-04-29 DIAGNOSIS — F33.0 MILD EPISODE OF RECURRENT MAJOR DEPRESSIVE DISORDER (HCC): ICD-10-CM

## 2024-04-29 DIAGNOSIS — F43.10 PTSD (POST-TRAUMATIC STRESS DISORDER): ICD-10-CM

## 2024-04-29 RX ORDER — FLUOXETINE HYDROCHLORIDE 40 MG/1
40 CAPSULE ORAL DAILY
Qty: 90 CAPSULE | Refills: 0 | Status: SHIPPED | OUTPATIENT
Start: 2024-04-29

## 2024-04-29 NOTE — TELEPHONE ENCOUNTER
Future Appointments   Date Time Provider Department Center   5/7/2024 10:45 AM Jonh Ramirez APRN - CNP [facility]   6/28/2024  9:30 AM Alexia Luo APRN - CNP [facility]   7/23/2024  9:00 AM Johana Ortega MD [facility]

## 2024-04-29 NOTE — TELEPHONE ENCOUNTER
Please Approve or Refuse.  Send to Pharmacy per Pt's Request: [facility]     Next Visit Date:  5/7/2024   Last Visit Date: 2/1/2024    Hemoglobin A1C (%)   Date Value   03/21/2022 5.1             ( goal A1C is < 7)   BP Readings from Last 3 Encounters:   04/05/24 (!) 146/98   02/01/24 (!) 145/89   01/12/24 (!) 148/98          (goal 120/80)  BUN   Date Value Ref Range Status   09/28/2023 8 6 - 20 mg/dL Final     Creatinine   Date Value Ref Range Status   09/28/2023 0.6 0.5 - 0.9 mg/dL Final     Potassium   Date Value Ref Range Status   09/28/2023 4.2 3.7 - 5.3 mmol/L Final

## 2024-07-29 DIAGNOSIS — F43.10 PTSD (POST-TRAUMATIC STRESS DISORDER): ICD-10-CM

## 2024-07-29 DIAGNOSIS — F33.0 MILD EPISODE OF RECURRENT MAJOR DEPRESSIVE DISORDER (HCC): ICD-10-CM

## 2024-07-29 DIAGNOSIS — E53.8 VITAMIN B 12 DEFICIENCY: ICD-10-CM

## 2024-07-29 DIAGNOSIS — M54.41 CHRONIC MIDLINE LOW BACK PAIN WITH RIGHT-SIDED SCIATICA: ICD-10-CM

## 2024-07-29 DIAGNOSIS — F41.1 GENERALIZED ANXIETY DISORDER: ICD-10-CM

## 2024-07-29 DIAGNOSIS — G89.29 CHRONIC MIDLINE LOW BACK PAIN WITH RIGHT-SIDED SCIATICA: ICD-10-CM

## 2024-07-29 DIAGNOSIS — J45.20 MILD INTERMITTENT ASTHMA, UNSPECIFIED WHETHER COMPLICATED: ICD-10-CM

## 2024-07-29 RX ORDER — ALBUTEROL SULFATE 90 UG/1
2 INHALANT RESPIRATORY (INHALATION) EVERY 6 HOURS PRN
Qty: 8 G | Refills: 3 | Status: SHIPPED | OUTPATIENT
Start: 2024-07-29

## 2024-07-29 RX ORDER — FLUOXETINE 40 MG/1
40 CAPSULE ORAL DAILY
Qty: 90 CAPSULE | Refills: 0 | OUTPATIENT
Start: 2024-07-29

## 2024-07-29 RX ORDER — GABAPENTIN 300 MG/1
300 CAPSULE ORAL 3 TIMES DAILY
Qty: 90 CAPSULE | Refills: 0 | OUTPATIENT
Start: 2024-07-29 | End: 2024-08-28

## 2024-07-29 NOTE — TELEPHONE ENCOUNTER
Please Approve or Refuse.  Send to Pharmacy per Pt's Request:      Next Visit Date:  11/14/2024   Last Visit Date: 2/1/2024    Hemoglobin A1C (%)   Date Value   03/21/2022 5.1             ( goal A1C is < 7)   BP Readings from Last 3 Encounters:   04/05/24 (!) 146/98   02/01/24 (!) 145/89   01/12/24 (!) 148/98          (goal 120/80)  BUN   Date Value Ref Range Status   09/28/2023 8 6 - 20 mg/dL Final     Creatinine   Date Value Ref Range Status   09/28/2023 0.6 0.5 - 0.9 mg/dL Final     Potassium   Date Value Ref Range Status   09/28/2023 4.2 3.7 - 5.3 mmol/L Final

## 2024-07-31 DIAGNOSIS — J30.89 SEASONAL ALLERGIC RHINITIS DUE TO OTHER ALLERGIC TRIGGER: ICD-10-CM

## 2024-07-31 RX ORDER — FLUTICASONE PROPIONATE 50 MCG
SPRAY, SUSPENSION (ML) NASAL
Qty: 16 G | Refills: 1 | Status: SHIPPED | OUTPATIENT
Start: 2024-07-31

## 2024-09-09 ENCOUNTER — E-VISIT (OUTPATIENT)
Dept: PRIMARY CARE CLINIC | Age: 44
End: 2024-09-09
Payer: COMMERCIAL

## 2024-09-09 DIAGNOSIS — L03.90 CELLULITIS, UNSPECIFIED CELLULITIS SITE: Primary | ICD-10-CM

## 2024-09-09 PROCEDURE — 99422 OL DIG E/M SVC 11-20 MIN: CPT | Performed by: NURSE PRACTITIONER

## 2024-09-12 RX ORDER — SULFAMETHOXAZOLE/TRIMETHOPRIM 800-160 MG
1 TABLET ORAL 2 TIMES DAILY
Qty: 14 TABLET | Refills: 0 | Status: SHIPPED | OUTPATIENT
Start: 2024-09-12 | End: 2024-09-19

## 2024-09-17 DIAGNOSIS — J30.89 SEASONAL ALLERGIC RHINITIS DUE TO OTHER ALLERGIC TRIGGER: ICD-10-CM

## 2024-09-17 RX ORDER — FLUTICASONE PROPIONATE 50 MCG
SPRAY, SUSPENSION (ML) NASAL
Qty: 16 G | Refills: 1 | Status: SHIPPED | OUTPATIENT
Start: 2024-09-17

## 2024-10-15 DIAGNOSIS — J45.20 MILD INTERMITTENT ASTHMA, UNSPECIFIED WHETHER COMPLICATED: ICD-10-CM

## 2024-10-15 RX ORDER — ALBUTEROL SULFATE 90 UG/1
2 INHALANT RESPIRATORY (INHALATION) EVERY 6 HOURS PRN
Qty: 6.7 G | Refills: 3 | Status: SHIPPED | OUTPATIENT
Start: 2024-10-15

## 2024-10-15 NOTE — TELEPHONE ENCOUNTER
Please Approve or Refuse.  Send to Pharmacy per Pt's Request: [facility]      Next Visit Date:  11/14/2024   Last Visit Date: 2/1/2024    Hemoglobin A1C (%)   Date Value   03/21/2022 5.1             ( goal A1C is < 7)   BP Readings from Last 3 Encounters:   04/05/24 (!) 146/98   02/01/24 (!) 145/89   01/12/24 (!) 148/98          (goal 120/80)  BUN   Date Value Ref Range Status   09/28/2023 8 6 - 20 mg/dL Final     Creatinine   Date Value Ref Range Status   09/28/2023 0.6 0.5 - 0.9 mg/dL Final     Potassium   Date Value Ref Range Status   09/28/2023 4.2 3.7 - 5.3 mmol/L Final

## 2024-10-20 ENCOUNTER — E-VISIT (OUTPATIENT)
Dept: PRIMARY CARE CLINIC | Age: 44
End: 2024-10-20

## 2024-10-20 DIAGNOSIS — R21 FACIAL RASH: Primary | ICD-10-CM

## 2024-10-20 RX ORDER — MUPIROCIN 20 MG/G
OINTMENT TOPICAL
Qty: 15 G | Refills: 0 | Status: SHIPPED | OUTPATIENT
Start: 2024-10-20 | End: 2024-10-27

## 2024-10-30 DIAGNOSIS — E53.8 VITAMIN B 12 DEFICIENCY: ICD-10-CM

## 2024-10-30 RX ORDER — LANOLIN ALCOHOL/MO/W.PET/CERES
1000 CREAM (GRAM) TOPICAL DAILY
Qty: 30 TABLET | Refills: 3 | Status: SHIPPED | OUTPATIENT
Start: 2024-10-30

## 2024-11-06 DIAGNOSIS — J30.89 SEASONAL ALLERGIC RHINITIS DUE TO OTHER ALLERGIC TRIGGER: ICD-10-CM

## 2024-11-06 RX ORDER — FLUTICASONE PROPIONATE 50 MCG
SPRAY, SUSPENSION (ML) NASAL
Qty: 16 G | Refills: 1 | Status: SHIPPED | OUTPATIENT
Start: 2024-11-06

## 2024-12-26 DIAGNOSIS — J30.89 SEASONAL ALLERGIC RHINITIS DUE TO OTHER ALLERGIC TRIGGER: ICD-10-CM

## 2024-12-27 RX ORDER — FLUTICASONE PROPIONATE 50 MCG
SPRAY, SUSPENSION (ML) NASAL
Qty: 16 G | Refills: 3 | Status: SHIPPED | OUTPATIENT
Start: 2024-12-27

## 2025-01-03 DIAGNOSIS — J45.20 MILD INTERMITTENT ASTHMA, UNSPECIFIED WHETHER COMPLICATED: ICD-10-CM

## 2025-01-03 RX ORDER — ALBUTEROL SULFATE 90 UG/1
2 INHALANT RESPIRATORY (INHALATION) EVERY 6 HOURS PRN
Qty: 6.7 G | Refills: 3 | Status: SHIPPED | OUTPATIENT
Start: 2025-01-03

## 2025-01-03 NOTE — TELEPHONE ENCOUNTER
Please Approve or Refuse.  Send to Pharmacy per Pt's Request:      Next Visit Date:  11/18/2025  Last Visit Date: 2/1/2024    Hemoglobin A1C (%)   Date Value   03/21/2022 5.1             ( goal A1C is < 7)   BP Readings from Last 3 Encounters:   04/05/24 (!) 146/98   02/01/24 (!) 145/89   01/12/24 (!) 148/98          (goal 120/80)  BUN   Date Value Ref Range Status   09/28/2023 8 6 - 20 mg/dL Final     Creatinine   Date Value Ref Range Status   09/28/2023 0.6 0.5 - 0.9 mg/dL Final     Potassium   Date Value Ref Range Status   09/28/2023 4.2 3.7 - 5.3 mmol/L Final

## 2025-01-03 NOTE — TELEPHONE ENCOUNTER
Patient needs appointment for chronic issues, and keep appointment scheduled for next year as well    Future Appointments   Date Time Provider Department Center   11/18/2025 11:00 AM Johana Ortega MD [id number]

## 2025-02-04 DIAGNOSIS — E53.8 VITAMIN B 12 DEFICIENCY: ICD-10-CM

## 2025-02-04 RX ORDER — LANOLIN ALCOHOL/MO/W.PET/CERES
1000 CREAM (GRAM) TOPICAL DAILY
Qty: 30 TABLET | Refills: 3 | Status: SHIPPED | OUTPATIENT
Start: 2025-02-04

## 2025-02-04 NOTE — TELEPHONE ENCOUNTER
Patient needs a nurse visit for blood pressure check and a separate follow-up for 6 months with me

## 2025-02-04 NOTE — TELEPHONE ENCOUNTER
Please Approve or Refuse.  Send to Pharmacy per Pt's Request: [facility]      Next Visit Date:  Visit date not found   Last Visit Date: 2/1/2024    Hemoglobin A1C (%)   Date Value   03/21/2022 5.1             ( goal A1C is < 7)   BP Readings from Last 3 Encounters:   04/05/24 (!) 146/98   02/01/24 (!) 145/89   01/12/24 (!) 148/98          (goal 120/80)  BUN   Date Value Ref Range Status   09/28/2023 8 6 - 20 mg/dL Final     Creatinine   Date Value Ref Range Status   09/28/2023 0.6 0.5 - 0.9 mg/dL Final     Potassium   Date Value Ref Range Status   09/28/2023 4.2 3.7 - 5.3 mmol/L Final

## 2025-02-10 DIAGNOSIS — M54.2 CHRONIC NECK PAIN: ICD-10-CM

## 2025-02-10 DIAGNOSIS — M54.41 CHRONIC MIDLINE LOW BACK PAIN WITH RIGHT-SIDED SCIATICA: ICD-10-CM

## 2025-02-10 DIAGNOSIS — G89.29 CHRONIC PAIN OF RIGHT KNEE: ICD-10-CM

## 2025-02-10 DIAGNOSIS — M25.561 CHRONIC PAIN OF RIGHT KNEE: ICD-10-CM

## 2025-02-10 DIAGNOSIS — I10 ESSENTIAL HYPERTENSION: ICD-10-CM

## 2025-02-10 DIAGNOSIS — G89.29 CHRONIC MIDLINE LOW BACK PAIN WITH RIGHT-SIDED SCIATICA: ICD-10-CM

## 2025-02-10 DIAGNOSIS — G89.29 CHRONIC NECK PAIN: ICD-10-CM

## 2025-02-10 RX ORDER — TIZANIDINE 2 MG/1
2 TABLET ORAL EVERY 12 HOURS PRN
Qty: 60 TABLET | Refills: 0 | Status: SHIPPED | OUTPATIENT
Start: 2025-02-10

## 2025-02-10 RX ORDER — LISINOPRIL 30 MG/1
30 TABLET ORAL DAILY
Qty: 30 TABLET | Refills: 3 | OUTPATIENT
Start: 2025-02-10

## 2025-02-10 NOTE — TELEPHONE ENCOUNTER
Please let the patient know to  prescription from the pharmacy.  Please let her know for lisinopril she needs to do basic metabolic panel and to schedule an appointment for nurse visit check in 1 week at most, and another appointment with one of the providers in 3 to 4 weeks    Orders Placed This Encounter   Medications    tiZANidine (ZANAFLEX) 2 MG tablet     Sig: Take 1 tablet by mouth every 12 hours as needed (For back pain or neck pain, do not drive when taking this medication)     Dispense:  60 tablet     Refill:  0       Medications Discontinued During This Encounter   Medication Reason    tiZANidine (ZANAFLEX) 2 MG tablet            [address] [phone] - F [phone]  [address] [place] [id number]  Phone: [phone] Fax: [phone]      Orders Placed This Encounter   Procedures    Basic Metabolic Panel     Standing Status:   Future     Standing Expiration Date:   2/10/2026       Future Appointments   Date Time Provider Department Center   11/18/2025 11:00 AM Johana Ortega MD [facility]       Thank you!

## 2025-02-10 NOTE — TELEPHONE ENCOUNTER
Please Approve or Refuse.  Send to Pharmacy per Pt's Request: [facility]      Next Visit Date:  called pt lvm   Last Visit Date: 2/1/2024    Hemoglobin A1C (%)   Date Value   03/21/2022 5.1             ( goal A1C is < 7)   BP Readings from Last 3 Encounters:   04/05/24 (!) 146/98   02/01/24 (!) 145/89   01/12/24 (!) 148/98          (goal 120/80)  BUN   Date Value Ref Range Status   09/28/2023 8 6 - 20 mg/dL Final     Creatinine   Date Value Ref Range Status   09/28/2023 0.6 0.5 - 0.9 mg/dL Final     Potassium   Date Value Ref Range Status   09/28/2023 4.2 3.7 - 5.3 mmol/L Final

## 2025-03-26 DIAGNOSIS — J45.20 MILD INTERMITTENT ASTHMA, UNSPECIFIED WHETHER COMPLICATED: ICD-10-CM

## 2025-03-26 RX ORDER — ALBUTEROL SULFATE 90 UG/1
2 INHALANT RESPIRATORY (INHALATION) EVERY 6 HOURS PRN
Qty: 6.7 G | Refills: 3 | Status: SHIPPED | OUTPATIENT
Start: 2025-03-26

## 2025-03-26 NOTE — TELEPHONE ENCOUNTER
Please Approve or Refuse.  Send to Pharmacy per Pt's Request:      Next Visit Date:  4/2/2025   Last Visit Date: 2/1/2024    Hemoglobin A1C (%)   Date Value   03/21/2022 5.1             ( goal A1C is < 7)   BP Readings from Last 3 Encounters:   04/05/24 (!) 146/98   02/01/24 (!) 145/89   01/12/24 (!) 148/98          (goal 120/80)  BUN   Date Value Ref Range Status   09/28/2023 8 6 - 20 mg/dL Final     Creatinine   Date Value Ref Range Status   09/28/2023 0.6 0.5 - 0.9 mg/dL Final     Potassium   Date Value Ref Range Status   09/28/2023 4.2 3.7 - 5.3 mmol/L Final

## 2025-04-02 DIAGNOSIS — J30.89 SEASONAL ALLERGIC RHINITIS DUE TO OTHER ALLERGIC TRIGGER: ICD-10-CM

## 2025-04-02 RX ORDER — FLUTICASONE PROPIONATE 50 MCG
SPRAY, SUSPENSION (ML) NASAL
Qty: 16 G | Refills: 3 | Status: SHIPPED | OUTPATIENT
Start: 2025-04-02

## 2025-04-02 NOTE — TELEPHONE ENCOUNTER
Needs nurse visit for blood pressure check    Future Appointments   Date Time Provider Department Center   11/18/2025 11:00 AM Johana Ortega MD [facility]

## 2025-05-14 DIAGNOSIS — E53.8 VITAMIN B 12 DEFICIENCY: ICD-10-CM

## 2025-05-14 RX ORDER — LANOLIN ALCOHOL/MO/W.PET/CERES
1000 CREAM (GRAM) TOPICAL DAILY
Qty: 30 TABLET | Refills: 3 | OUTPATIENT
Start: 2025-05-14

## 2025-05-14 NOTE — TELEPHONE ENCOUNTER
Please Approve or Refuse.  Send to Pharmacy per Pt's Request:      Next Visit Date:  Visit date not found   Last Visit Date: 2/1/2024    Hemoglobin A1C (%)   Date Value   03/21/2022 5.1             ( goal A1C is < 7)   BP Readings from Last 3 Encounters:   04/05/24 (!) 146/98   02/01/24 (!) 145/89   01/12/24 (!) 148/98          (goal 120/80)  BUN   Date Value Ref Range Status   09/28/2023 8 6 - 20 mg/dL Final     Creatinine   Date Value Ref Range Status   09/28/2023 0.6 0.5 - 0.9 mg/dL Final     Potassium   Date Value Ref Range Status   09/28/2023 4.2 3.7 - 5.3 mmol/L Final

## 2025-05-19 DIAGNOSIS — I10 ESSENTIAL HYPERTENSION: ICD-10-CM

## 2025-05-19 RX ORDER — LISINOPRIL 30 MG/1
30 TABLET ORAL DAILY
Qty: 30 TABLET | Refills: 3 | OUTPATIENT
Start: 2025-05-19

## 2025-05-19 NOTE — TELEPHONE ENCOUNTER
Patient needs a nurse visit for blood pressure check first, she has missed some appointments    Then I will refill her lisinopril promptly    BP Readings from Last 3 Encounters:   04/05/24 (!) 146/98   02/01/24 (!) 145/89   01/12/24 (!) 148/98

## 2025-06-18 DIAGNOSIS — J45.20 MILD INTERMITTENT ASTHMA, UNSPECIFIED WHETHER COMPLICATED: ICD-10-CM

## 2025-06-18 RX ORDER — ALBUTEROL SULFATE 90 UG/1
2 INHALANT RESPIRATORY (INHALATION) EVERY 6 HOURS PRN
Qty: 6.7 G | Refills: 3 | Status: SHIPPED | OUTPATIENT
Start: 2025-06-18

## 2025-07-07 DIAGNOSIS — J30.89 SEASONAL ALLERGIC RHINITIS DUE TO OTHER ALLERGIC TRIGGER: ICD-10-CM

## 2025-07-08 RX ORDER — FLUTICASONE PROPIONATE 50 MCG
SPRAY, SUSPENSION (ML) NASAL
Qty: 16 G | Refills: 3 | Status: SHIPPED | OUTPATIENT
Start: 2025-07-08

## 2025-07-08 NOTE — TELEPHONE ENCOUNTER
Please Approve or Refuse.  Send to Pharmacy per Pt's Request: [facility]      Next Visit Date:  7/10/2025   Last Visit Date: 2/1/2024    Hemoglobin A1C (%)   Date Value   03/21/2022 5.1             ( goal A1C is < 7)   BP Readings from Last 3 Encounters:   04/05/24 (!) 146/98   02/01/24 (!) 145/89   01/12/24 (!) 148/98          (goal 120/80)  BUN   Date Value Ref Range Status   09/28/2023 8 6 - 20 mg/dL Final     Creatinine   Date Value Ref Range Status   09/28/2023 0.6 0.5 - 0.9 mg/dL Final     Potassium   Date Value Ref Range Status   09/28/2023 4.2 3.7 - 5.3 mmol/L Final             22-Feb-2020 20:04

## 2025-09-03 DIAGNOSIS — J45.20 MILD INTERMITTENT ASTHMA, UNSPECIFIED WHETHER COMPLICATED: ICD-10-CM

## 2025-09-03 RX ORDER — ALBUTEROL SULFATE 90 UG/1
2 INHALANT RESPIRATORY (INHALATION) EVERY 6 HOURS PRN
Qty: 6.7 G | Refills: 3 | Status: SHIPPED | OUTPATIENT
Start: 2025-09-03